# Patient Record
Sex: MALE | Race: WHITE | Employment: OTHER | URBAN - METROPOLITAN AREA
[De-identification: names, ages, dates, MRNs, and addresses within clinical notes are randomized per-mention and may not be internally consistent; named-entity substitution may affect disease eponyms.]

---

## 2017-03-27 ENCOUNTER — OFFICE VISIT (OUTPATIENT)
Dept: CARDIOLOGY CLINIC | Age: 72
End: 2017-03-27

## 2017-03-27 VITALS
BODY MASS INDEX: 32.93 KG/M2 | RESPIRATION RATE: 16 BRPM | HEART RATE: 55 BPM | DIASTOLIC BLOOD PRESSURE: 80 MMHG | OXYGEN SATURATION: 99 % | WEIGHT: 235.2 LBS | SYSTOLIC BLOOD PRESSURE: 142 MMHG | HEIGHT: 71 IN

## 2017-03-27 DIAGNOSIS — I48.0 PAROXYSMAL A-FIB (HCC): Primary | ICD-10-CM

## 2017-03-27 RX ORDER — DIGOXIN 125 MCG
TABLET ORAL
Qty: 36 TAB | Refills: 3 | Status: SHIPPED | OUTPATIENT
Start: 2017-03-27 | End: 2018-04-11 | Stop reason: SDUPTHER

## 2017-03-27 RX ORDER — ATENOLOL 25 MG/1
25 TABLET ORAL DAILY
Qty: 90 TAB | Refills: 3 | Status: SHIPPED | OUTPATIENT
Start: 2017-03-27 | End: 2018-04-11 | Stop reason: SDUPTHER

## 2017-03-27 NOTE — PROGRESS NOTES
[facility] [place]: Richard Cristina  [phone]    HPI:   Mr. Elisabeth Olivo is a 64 yo M seen initially for afib with RVR noted on 1/21/13 ER visit, h/o CT followed by Dr. Ray Preston for question of aortic dissection and pt was started on atenolol; CT chest was recommended but pt did not want \"extra radiation\". Since his last visit, he continues to do well. He has rare palpitations that he may have for a day or two, but no associated lightheadedness, dizziness or shortness of breath. He denies any exertional chest pains. His breathing has been good. He likes to take natural supplements and we had multiple discussions in the past about this. EKG is sinus bradycardia with a heart rate of 55 and nonspecific ST-T wave abnormality, unchanged. He is compensated on exam with clear lungs. Soc Hx. No tobacco.  . 1 child. [employer] . Fam Hx. Father side uncles CAD 52's  Assessment and Plan:  Paroxysmal atrial fibrillation. Continues stable on Atenolol and Digoxin. His EKG is sinus rhythm here. CHADS VASC score is on the lower side and will continue aspirin for now. As we get closer to 74years old, would recommend being on a stronger blood thinner, but he notes a lot of reluctance to that. Will address it when we get there. Will have him follow up in one year. He  has a past medical history of Essential hypertension. He also has no past medical history of Diabetes or Hyperlipidemia. All other systems negative except as above. PE  Filed Vitals:    01/30/14 0947 01/30/14 0951   BP: 140/80 136/78   Pulse: 56    Resp: 18    Height: 5' 11\" (1.803 m)    Weight: 234 lb 9.6 oz (106.414 kg)     Body mass index is 32.73 kg/(m^2).    General appearance - alert, well appearing, and in no distress  Mental status - affect appropriate to mood  Eyes - sclera anicteric, moist mucous membranes  Neck - supple, no JVD  Chest - clear to auscultation, no wheezes, rales or rhonchi  Heart - regular rhythm colton, normal S1, S2, I/VI systolic murmur LUSB  Abdomen - soft, nontender, nondistended  Extremities - peripheral pulses normal, no pedal edema  Recent Labs:  No results found for this basename: CHOL,  [id number],  CHOLX,  CHOLP,  CHLST,  CHOLV,  [id number],  HDL,  [id number],  HDLC,  HDLP,  LDL,  DLDL,  LDLC,  DLDLP,  TGL,  Khushi Shall,  [id number],  TRIGP,  CHHD,  [facility]     Lab Results   Component Value Date/Time    Creatinine 0.51 1/21/2013  3:40 AM     Lab Results   Component Value Date/Time    BUN 16 1/21/2013  3:40 AM     Lab Results   Component Value Date/Time    Potassium 3.7 1/21/2013  3:40 AM     No results found for this basename: HBA1C,  HGBE8     Lab Results   Component Value Date/Time    HGB 13.8 1/21/2013  3:40 AM     Lab Results   Component Value Date/Time    PLATELET 223 0/89/3260  3:40 AM       Reviewed:  Past Medical History   Diagnosis Date    Essential hypertension      History   Smoking status    Never Smoker    Smokeless tobacco    Never Used     History   Alcohol Use    Yes     Comment: Occasional     No Known Allergies    Current Outpatient Prescriptions   Medication Sig    OTHER Heart Herbal Formula ([place] berry, garlic, eulalia, cactus stem and flower, red clover, motherwort herb, habanero pepper) Couple of drops 2-3 days a week    atenolol (TENORMIN) 25 mg tablet Take 1 Tab by mouth daily.  digoxin (LANOXIN) 0.125 mg tablet Take 1 Tab by mouth every Monday, Wednesday, Friday.  folic acid 285 mcg tablet Take 400 mcg by mouth daily.  DOCOSAHEXANOIC ACID/EPA (FISH OIL PO) Take  by mouth. 1 tblspn daily     coenzyme q10-vitamin e (COQ10 ) 100-100 mg-unit cap Take 1 Tab by mouth daily.  aspirin (ASPIRIN) 325 mg tablet Take 1 Tab by mouth daily. No current facility-administered medications for this visit.        MD Kristin Jewell [facility] [place]  [address] [facility] [place], [address] [place] [address]

## 2017-03-27 NOTE — LETTER
3/28/2017 9:37 AM 
 
Patient:  Ximena Christopher YOB: 1945 Date of Visit: 3/27/2017 Marga Pompa MD 
[address] [address] VIA Facsimile: [phone] 
 : 
Dear Dimas Woodall, 
 
Mr. Ximena Christopher is a 64 yo M seen initially for afib with RVR noted on 1/21/13 ER visit, h/o CT followed by Dr. Daniela Mccollum for question of aortic dissection and pt was started on atenolol; CT chest was recommended but pt did not want \"extra radiation\". Since his last visit, he continues to do well. He has rare palpitations that he may have for a day or two, but no associated lightheadedness, dizziness or shortness of breath. He denies any exertional chest pains. His breathing has been good. He likes to take natural supplements and we had multiple discussions in the past about this. EKG is sinus bradycardia with a heart rate of 55 and nonspecific ST-T wave abnormality, unchanged. He is compensated on exam with clear lungs. Soc Hx. No tobacco.  . 1 child. [facility] . Fam Hx. Father side uncles CAD 52's Assessment and Plan:  Paroxysmal atrial fibrillation. Continues stable on Atenolol and Digoxin. His EKG is sinus rhythm here. CHADS VASC score is on the lower side and will continue aspirin for now. As we get closer to 74years old, would recommend being on a stronger blood thinner, but he notes a lot of reluctance to that. Will address it when we get there. Will have him follow up in one year. If you have questions, please do not hesitate to call me. Sincerely, Rene Burns MD

## 2017-03-27 NOTE — MR AVS SNAPSHOT
Visit Information Date & Time Provider Department Dept. Phone Encounter #  
 3/27/2017 11:20 AM Gaston Valdez MD [facility] Samara Buck [phone] [id number] Upcoming Health Maintenance Date Due DTaP/Tdap/Td series (1 - Tdap) 9/22/1964 FOBT Q 1 YEAR AGE 50-75 9/22/1993 ZOSTER VACCINE AGE 60> 9/22/2003 GLAUCOMA SCREENING Q2Y 9/22/2008 Pneumococcal 65+ Low/Medium Risk (1 of 2 - PCV13) 9/22/2008 MEDICARE YEARLY EXAM 9/22/2008 INFLUENZA AGE 9 TO ADULT 8/1/2016 Allergies as of 3/27/2017  Review Complete On: 3/27/2017 By: Gaston Valdez MD  
 No Known Allergies Current Immunizations  Never Reviewed No immunizations on file. Not reviewed this visit You Were Diagnosed With   
  
 Codes Comments Paroxysmal a-fib (HCC)    -  Primary ICD-10-CM: I48.0 ICD-9-CM: 427.31 Vitals BP Pulse Resp Height(growth percentile) Weight(growth percentile) SpO2  
 142/80 (BP 1 Location: Left arm, BP Patient Position: Sitting) (!) 55 16 5' 11\" (1.803 m) 235 lb 3.2 oz (106.7 kg) 99% BMI Smoking Status 32.8 kg/m2 Never Smoker BMI and BSA Data Body Mass Index Body Surface Area  
 32.8 kg/m 2 2.31 m 2 Preferred Pharmacy Pharmacy Name Phone 100 Urszula Verma, [facility] [phone] Your Updated Medication List  
  
   
This list is accurate as of: 3/27/17 12:29 PM.  Always use your most recent med list.  
  
  
  
  
 aspirin 325 mg tablet Commonly known as:  ASPIRIN Take 1 Tab by mouth daily. atenolol 25 mg tablet Commonly known as:  TENORMIN Take 1 Tab by mouth daily. COQ10  100-100 mg-unit Cap Generic drug:  coenzyme q10-vitamin e Take 1 Tab by mouth daily. digoxin 0.125 mg tablet Commonly known as:  LANOXIN  
TAKE 1 TABLET EVERY Brice Jacey FISH OIL PO Take  by mouth. 1 tblspn daily folic acid 850 mcg tablet Take 400 mcg by mouth daily. Taking 2 times per week OTHER Heart Herbal Formula ([place] berry, garlic, eulalia, cactus stem and flower, red clover, motherwort herb, habanero pepper) Couple of dropperfuls 2-3 days a week We Performed the Following AMB POC EKG ROUTINE W/ 12 LEADS, INTER & REP [53472 CPT(R)] Introducing [facility] & HEALTH SERVICES! Romayne Duster introduces [employer] patient portal. Now you can access parts of your medical record, email your doctor's office, and request medication refills online. 1. In your internet browser, go to [email or web address] 2. Click on the First Time User? Click Here link in the Sign In box. You will see the New Member Sign Up page. 3. Enter your [employer] Access Code exactly as it appears below. You will not need to use this code after youve completed the sign-up process. If you do not sign up before the expiration date, you must request a new code. · [employer] Access Code: [email or web address] Expires: 6/25/2017 12:29 PM 
 
4. Enter the last four digits of your Social Security Number (xxxx) and Date of Birth (mm/dd/yyyy) as indicated and click Submit. You will be taken to the next sign-up page. 5. Create a [employer] ID. This will be your [employer] login ID and cannot be changed, so think of one that is secure and easy to remember. 6. Create a [employer] password. You can change your password at any time. 7. Enter your Password Reset Question and Answer. This can be used at a later time if you forget your password. 8. Enter your e-mail address. You will receive e-mail notification when new information is available in [address] 9. Click Sign Up. You can now view and download portions of your medical record. 10. Click the Download Summary menu link to download a portable copy of your medical information.  
 
If you have questions, please visit the Frequently Asked Questions section of the Appointedd. Remember, "[employer]"hart is NOT to be used for urgent needs. For medical emergencies, dial 911. Now available from your iPhone and Android! Please provide this summary of care documentation to your next provider. Your primary care clinician is listed as Angel Ruiz. If you have any questions after today's visit, please call [phone].

## 2018-03-27 ENCOUNTER — OFFICE VISIT (OUTPATIENT)
Dept: CARDIOLOGY CLINIC | Age: 73
End: 2018-03-27

## 2018-03-27 VITALS
HEIGHT: 71 IN | HEART RATE: 60 BPM | BODY MASS INDEX: 32.84 KG/M2 | WEIGHT: 234.6 LBS | DIASTOLIC BLOOD PRESSURE: 80 MMHG | SYSTOLIC BLOOD PRESSURE: 130 MMHG | RESPIRATION RATE: 16 BRPM

## 2018-03-27 DIAGNOSIS — I48.0 PAROXYSMAL A-FIB (HCC): Primary | ICD-10-CM

## 2018-03-27 RX ORDER — WARFARIN SODIUM 5 MG/1
5 TABLET ORAL DAILY
COMMUNITY
End: 2018-03-27 | Stop reason: SDUPTHER

## 2018-03-27 RX ORDER — WARFARIN SODIUM 5 MG/1
5 TABLET ORAL DAILY
Qty: 30 TAB | Refills: 3 | Status: SHIPPED | OUTPATIENT
Start: 2018-03-27 | End: 2019-04-18 | Stop reason: ALTCHOICE

## 2018-03-27 NOTE — LETTER
3/28/2018 10:01 AM 
 
Patient:  Ana Templeton YOB: 1945 Date of Visit: 3/27/2018 Gregoria Campos MD 
[address] [address] VIA Facsimile: [phone] 
 : 
Dear Stephanie Ndiaye, 
 
Mr. Ana Templeton is a 73 yo M seen initially for afib with RVR noted on 1/21/13 ER visit, h/o CT followed by Dr. Eriberto Carrasquillo for question of aortic dissection and pt was started on atenolol; CT chest was recommended but pt did not want \"extra radiation\". Since his last visit, he continues to do well. Rare palpitations that may last minutes or hours, but no associated lightheadedness, dizziness or shortness of breath. No exertional chest pain. He may get occasional discomfort with certain foods. His breathing overall has been stable. In the past he has noted he likes taking natural substances and we have had multiple discussions about this in the past.  His blood pressure is 136/76 with a heart rate of 56. He is compensated on exam with clear lungs. Soc Hx. No tobacco.  . 1 child. [facility] . Fam Hx. Father side uncles CAD 52's Assessment and Plan:   
Paroxysmal atrial fibrillation. Continues stable on Atenolol and Digoxin for rate control. His CHADS VASC score is now almost 2 given his age and I did recommend starting either Coumadin or Eliquis. He has a lot of reservations about this and tried to answer his questions. If he does start Coumadin, he will need an INR check within four to five days. With regard to Eliquis, he would be on 5 mg twice a day. We talked about things to look out for with regard to being on a blood thinner. I told him not to take aspirin concurrently. Will have him follow back in one year. If you have questions, please do not hesitate to call me. Sincerely, Celia Raines MD

## 2018-03-27 NOTE — PROGRESS NOTES
[facility] [place]: Federica Webber  [phone]    HPI:   Mr. Brooks Gordon is a 73 yo M seen initially for afib with RVR noted on 1/21/13 ER visit, h/o CT followed by Dr. Magalie Valle for question of aortic dissection and pt was started on atenolol; CT chest was recommended but pt did not want \"extra radiation\". Since his last visit, he continues to do well. Rare palpitations that may last minutes or hours, but no associated lightheadedness, dizziness or shortness of breath. No exertional chest pain. He may get occasional discomfort with certain foods. His breathing overall has been stable. In the past he has noted he likes taking natural substances and we have had multiple discussions about this in the past.  His blood pressure is 136/76 with a heart rate of 56. He is compensated on exam with clear lungs. Soc Hx. No tobacco.  . 1 child. [facility] . Fam Hx. Father side uncles CAD 52's  Assessment and Plan:    Paroxysmal atrial fibrillation. Continues stable on Atenolol and Digoxin for rate control. His CHADS VASC score is now almost 2 given his age and I did recommend starting either Coumadin or Eliquis. He has a lot of reservations about this and tried to answer his questions. If he does start Coumadin, he will need an INR check within four to five days. With regard to Eliquis, he would be on 5 mg twice a day. We talked about things to look out for with regard to being on a blood thinner. I told him not to take aspirin concurrently. Will have him follow back in one year. He  has a past medical history of Essential hypertension. He also has no past medical history of Diabetes or Hyperlipidemia. All other systems negative except as above. PE  Filed Vitals:    01/30/14 0947 01/30/14 0951   BP: 140/80 136/78   Pulse: 56    Resp: 18    Height: 5' 11\" (1.803 m)    Weight: 234 lb 9.6 oz (106.414 kg)     Body mass index is 32.73 kg/(m^2).    General appearance - alert, well appearing, and in no distress  Mental status - affect appropriate to mood  Eyes - sclera anicteric, moist mucous membranes  Neck - supple, no JVD  Chest - clear to auscultation, no wheezes, rales or rhonchi  Heart - regular rhythm colton, normal S1, S2, I/VI systolic murmur LUSB  Abdomen - soft, nontender, nondistended  Extremities - peripheral pulses normal, no pedal edema  Recent Labs:  No results found for this basename: CHOL,  [id number],  CHOLX,  CHOLP,  CHLST,  CHOLV,  [id number],  HDL,  [id number],  HDLC,  HDLP,  LDL,  DLDL,  LDLC,  DLDLP,  TGL,  [place],  [id number],  TRIGP,  CHHD,  CHHDX     Lab Results   Component Value Date/Time    Creatinine 0.51 1/21/2013  3:40 AM     Lab Results   Component Value Date/Time    BUN 16 1/21/2013  3:40 AM     Lab Results   Component Value Date/Time    Potassium 3.7 1/21/2013  3:40 AM     No results found for this basename: HBA1C,  HGBE8     Lab Results   Component Value Date/Time    HGB 13.8 1/21/2013  3:40 AM     Lab Results   Component Value Date/Time    PLATELET 261 4/14/3638  3:40 AM       Reviewed:  Past Medical History   Diagnosis Date    Essential hypertension      History   Smoking status    Never Smoker    Smokeless tobacco    Never Used     History   Alcohol Use    Yes     Comment: Occasional     No Known Allergies    Current Outpatient Prescriptions   Medication Sig    OTHER Heart Herbal Formula ([place] berry, garlic, eulalia, cactus stem and flower, red clover, motherwort herb, habanero pepper) Couple of drops 2-3 days a week    atenolol (TENORMIN) 25 mg tablet Take 1 Tab by mouth daily.  digoxin (LANOXIN) 0.125 mg tablet Take 1 Tab by mouth every Monday, Wednesday, Friday.  folic acid 425 mcg tablet Take 400 mcg by mouth daily.  DOCOSAHEXANOIC ACID/EPA (FISH OIL PO) Take  by mouth. 1 tblspn daily     coenzyme q10-vitamin e (COQ10 ) 100-100 mg-unit cap Take 1 Tab by mouth daily.       aspirin (ASPIRIN) 325 mg tablet Take 1 Tab by mouth daily. No current facility-administered medications for this visit.        Carmelina Garsia MD  [facility] [place]  [address] [place], [address] [place] [address]

## 2018-03-27 NOTE — MR AVS SNAPSHOT
[address] [facility] [address] [place] 
[phone] Patient: Ced Dickerson MRN: [id number] LLU:[birth year]/90/[birth year]99 Visit Information Date & Time Provider Department Dept. Phone Encounter #  
 3/27/2018  1:00 PM Michelle Andrade MD [facility] [place] [phone] [id number] Your Appointments 4/2/2019  1:00 PM  
ESTABLISHED PATIENT with Michelle Andrade MD  
[facility] ([address]) Appt Note: 1 yr f/u per Dr. Aguirre Deaner [address] [place] [address]  
[address] [facility] [address] Upcoming Health Maintenance Date Due DTaP/Tdap/Td series (1 - Tdap) 9/22/1964 FOBT Q 1 YEAR AGE 50-75 9/22/1993 ZOSTER VACCINE AGE 60> 7/22/2003 GLAUCOMA SCREENING Q2Y 9/22/2008 Pneumococcal 65+ Low/Medium Risk (1 of 2 - PCV13) 9/22/2008 Influenza Age 5 to Adult 8/1/2017 MEDICARE YEARLY EXAM 3/14/2018 Allergies as of 3/27/2018  Review Complete On: 3/27/2018 By: Michelle Andrade MD  
 No Known Allergies Current Immunizations  Never Reviewed No immunizations on file. Not reviewed this visit You Were Diagnosed With   
  
 Codes Comments Paroxysmal a-fib (HCC)    -  Primary ICD-10-CM: I48.0 ICD-9-CM: 427.31 Vitals BP Pulse Resp Height(growth percentile) Weight(growth percentile) BMI  
 130/80 (BP 1 Location: Right arm, BP Patient Position: Sitting) 60 16 5' 11\" (1.803 m) 234 lb 9.6 oz (106.4 kg) 32.72 kg/m2 Smoking Status Never Smoker Vitals History BMI and BSA Data Body Mass Index Body Surface Area 32.72 kg/m 2 2.31 m 2 Preferred Pharmacy Pharmacy Name Phone 100 [facility] [phone] Your Updated Medication List  
  
   
 This list is accurate as of 3/27/18  1:38 PM.  Always use your most recent med list.  
  
  
  
  
 apixaban 5 mg tablet Commonly known as:  Floyd Means Take 1 Tab by mouth two (2) times a day. aspirin 325 mg tablet Commonly known as:  ASPIRIN Take 1 Tab by mouth daily. atenolol 25 mg tablet Commonly known as:  TENORMIN Take 1 Tab by mouth daily. COQ10  100-100 mg-unit Cap Generic drug:  coenzyme q10-vitamin e Take 1 Tab by mouth daily. digoxin 0.125 mg tablet Commonly known as:  LANOXIN  
TAKE 1 TABLET EVERY Param Golder FISH OIL PO Take  by mouth. 1 tblspn daily  
  
 folic acid 744 mcg tablet Take 400 mcg by mouth daily. Taking 2 times per week OTHER Heart Herbal Formula ([place] berry, garlic, eulalia, cactus stem and flower, red clover, motherwort herb, habanero pepper) Couple of dropperfuls 2-3 days a week  
  
 warfarin 5 mg tablet Commonly known as:  COUMADIN Take 1 Tab by mouth daily. Prescriptions Printed Refills  
 apixaban (ELIQUIS) 5 mg tablet 3 Sig: Take 1 Tab by mouth two (2) times a day. Class: Print Route: Oral  
 warfarin (COUMADIN) 5 mg tablet 3 Sig: Take 1 Tab by mouth daily. Class: Print Route: Oral  
  
Introducing [facility] & HEALTH SERVICES! Yvan Fabian introduces [employer] patient portal. Now you can access parts of your medical record, email your doctor's office, and request medication refills online. 1. In your internet browser, go to [email or web address] 2. Click on the First Time User? Click Here link in the Sign In box. You will see the New Member Sign Up page. 3. Enter your [employer] Access Code exactly as it appears below. You will not need to use this code after youve completed the sign-up process. If you do not sign up before the expiration date, you must request a new code. · [employer] Access Code: [email or web address] Expires: 6/25/2018  1:38 PM 
 
 4. Enter the last four digits of your Social Security Number (xxxx) and Date of Birth (mm/dd/yyyy) as indicated and click Submit. You will be taken to the next sign-up page. 5. Create a [employer]ina ID. This will be your [employer] login ID and cannot be changed, so think of one that is secure and easy to remember. 6. Create a [employer] password. You can change your password at any time. 7. Enter your Password Reset Question and Answer. This can be used at a later time if you forget your password. 8. Enter your e-mail address. You will receive e-mail notification when new information is available in [address] 9. Click Sign Up. You can now view and download portions of your medical record. 10. Click the Download Summary menu link to download a portable copy of your medical information. If you have questions, please visit the Frequently Asked Questions section of the [employer] website. Remember, [employer] is NOT to be used for urgent needs. For medical emergencies, dial 911. Now available from your iPhone and Android! Please provide this summary of care documentation to your next provider. Your primary care clinician is listed as Gus Almodovar. If you have any questions after today's visit, please call [phone].

## 2018-04-11 RX ORDER — ATENOLOL 25 MG/1
25 TABLET ORAL DAILY
Qty: 90 TAB | Refills: 3 | Status: SHIPPED | OUTPATIENT
Start: 2018-04-11 | End: 2019-04-19 | Stop reason: SDUPTHER

## 2018-04-11 RX ORDER — DIGOXIN 125 MCG
TABLET ORAL
Qty: 36 TAB | Refills: 3 | Status: SHIPPED | OUTPATIENT
Start: 2018-04-11 | End: 2019-04-19 | Stop reason: SDUPTHER

## 2018-04-11 NOTE — TELEPHONE ENCOUNTER
Pt is asking for a refill on Dijoxin and atenolol. I do not see the disjoxin on the pt's med list. Pharmacy verified. Requested Prescriptions     Pending Prescriptions Disp Refills    atenolol (TENORMIN) 25 mg tablet 90 Tab 3     Sig: Take 1 Tab by mouth daily. Thanks!

## 2018-04-11 NOTE — TELEPHONE ENCOUNTER
Requested Prescriptions     Signed Prescriptions Disp Refills    atenolol (TENORMIN) 25 mg tablet 90 Tab 3     Sig: Take 1 Tab by mouth daily. Authorizing Provider: Karen Leo     Ordering User: Kodak Whitley digoxin (LANOXIN) 0.125 mg tablet 36 Tab 3     Sig: TAKE 1 TABLET EVERY MONDAY, [address] [facility], [address] [facility] Provider: Karen Leo     Ordering User: Elly Salomon     Per orders.

## 2019-04-18 ENCOUNTER — OFFICE VISIT (OUTPATIENT)
Dept: CARDIOLOGY CLINIC | Age: 74
End: 2019-04-18

## 2019-04-18 VITALS
DIASTOLIC BLOOD PRESSURE: 68 MMHG | HEIGHT: 71 IN | OXYGEN SATURATION: 98 % | HEART RATE: 56 BPM | SYSTOLIC BLOOD PRESSURE: 120 MMHG | RESPIRATION RATE: 16 BRPM | WEIGHT: 248 LBS | BODY MASS INDEX: 34.72 KG/M2

## 2019-04-18 DIAGNOSIS — I48.0 PAROXYSMAL A-FIB (HCC): Primary | ICD-10-CM

## 2019-04-18 NOTE — LETTER
4/18/2019 3:32 PM 
 
Patient:  Aquilino Billy YOB: 1945 Date of Visit: 4/18/2019 Katie Faulkner MD 
[address] [address] VIA Facsimile: [phone] 
 : 
Dear Denise Davis, 
 
Mr. Aquilino Billy is a 73 yo M seen initially for afib with RVR noted on 1/21/13 ER visit, h/o CT followed by Dr. Amisha Damico for question of aortic dissection and pt was started on atenolol; CT chest was recommended but pt did not want \"extra radiation\". Since his last visit, he has been doing well. No significant palpitations. His breathing has been normal.  He denies any chest pain. We did talk in the past about Coumadin versus Eliquis given his CHADS VASC score and he has been reading a lot about this. In particular with Coumadin, he notes he is unable to work with the dietary restrictions. Eliquis he is interested in taking, but notes that it is very expensive and it seems like it is $400.00 at first, but then $50.00 a month after that. We did talk about this at length and discussed the benefits and risks. He is compensated on exam with clear lungs and no lower extremity edema. His EKG is sinus bradycardia, heart rate of 54 and nonspecific ST-T wave abnormality. Soc Hx. No tobacco.  . 1 child. [facility] . Fam Hx. Father side uncles CAD 52's Assessment and Plan:  Paroxysmal atrial fibrillation. Stable on Atenolol and Digoxin for rate control. His CHADS VASC score is at least 2. He is agreeable now to starting Eliquis and this will replace aspirin. Will have him follow back in one year. If you have questions, please do not hesitate to call me. Sincerely, Sindi Tinoco MD

## 2019-04-18 NOTE — PROGRESS NOTES
[facility] [place]: Kwabena Melchor  [phone]    HPI:   Mr. Trish Dumont is a 75 yo M seen initially for afib with RVR noted on 1/21/13 ER visit, h/o CT followed by Dr. Meaghan Singh for question of aortic dissection and pt was started on atenolol; CT chest was recommended but pt did not want \"extra radiation\". Since his last visit, he has been doing well. No significant palpitations. His breathing has been normal.  He denies any chest pain. We did talk in the past about Coumadin versus Eliquis given his CHADS VASC score and he has been reading a lot about this. In particular with Coumadin, he notes he is unable to work with the dietary restrictions. Eliquis he is interested in taking, but notes that it is very expensive and it seems like it is $400.00 at first, but then $50.00 a month after that. We did talk about this at length and discussed the benefits and risks. He is compensated on exam with clear lungs and no lower extremity edema. His EKG is sinus bradycardia, heart rate of 54 and nonspecific ST-T wave abnormality. Soc Hx. No tobacco.  . 1 child. [facility] . Fam Hx. Father side uncles CAD 52's  Assessment and Plan:  Paroxysmal atrial fibrillation. Stable on Atenolol and Digoxin for rate control. His CHADS VASC score is at least 2. He is agreeable now to starting Eliquis and this will replace aspirin. Will have him follow back in one year. He  has a past medical history of Essential hypertension. He also has no past medical history of Diabetes or Hyperlipidemia. All other systems negative except as above. PE  Filed Vitals:    01/30/14 0947 01/30/14 0951   BP: 140/80 136/78   Pulse: 56    Resp: 18    Height: 5' 11\" (1.803 m)    Weight: 234 lb 9.6 oz (106.414 kg)     Body mass index is 32.73 kg/(m^2).    General appearance - alert, well appearing, and in no distress  Mental status - affect appropriate to mood  Eyes - sclera anicteric, moist mucous membranes  Neck - supple, no JVD  Chest - clear to auscultation, no wheezes, rales or rhonchi  Heart - regular rhythm colton, normal S1, S2, I/VI systolic murmur LUSB  Abdomen - soft, nontender, nondistended  Extremities - peripheral pulses normal, no pedal edema  Recent Labs:  No results found for this basename: CHOL,  [id number],  CHOLX,  CHOLP,  CHLST,  CHOLV,  [id number],  HDL,  [id number],  HDLC,  HDLP,  LDL,  DLDL,  LDLC,  DLDLP,  TGL,  Singh ,  [id number],  TRIGP,  CHHD,  [facility]     Lab Results   Component Value Date/Time    Creatinine 0.51 1/21/2013  3:40 AM     Lab Results   Component Value Date/Time    BUN 16 1/21/2013  3:40 AM     Lab Results   Component Value Date/Time    Potassium 3.7 1/21/2013  3:40 AM     No results found for this basename: HBA1C,  HGBE8     Lab Results   Component Value Date/Time    HGB 13.8 1/21/2013  3:40 AM     Lab Results   Component Value Date/Time    PLATELET 691 2/26/5411  3:40 AM       Reviewed:  Past Medical History   Diagnosis Date    Essential hypertension      History   Smoking status    Never Smoker    Smokeless tobacco    Never Used     History   Alcohol Use    Yes     Comment: Occasional     No Known Allergies    Current Outpatient Prescriptions   Medication Sig    OTHER Heart Herbal Formula ([place] berry, garlic, eulalia, cactus stem and flower, red clover, motherwort herb, habanero pepper) Couple of drops 2-3 days a week    atenolol (TENORMIN) 25 mg tablet Take 1 Tab by mouth daily.  digoxin (LANOXIN) 0.125 mg tablet Take 1 Tab by mouth every Monday, Wednesday, Friday.  folic acid 199 mcg tablet Take 400 mcg by mouth daily.  DOCOSAHEXANOIC ACID/EPA (FISH OIL PO) Take  by mouth. 1 tblspn daily     coenzyme q10-vitamin e (COQ10 ) 100-100 mg-unit cap Take 1 Tab by mouth daily.  aspirin (ASPIRIN) 325 mg tablet Take 1 Tab by mouth daily. No current facility-administered medications for this visit.        Jennifer Alvarado MD  [facility] [address] [facility] [address], [address] [facility]  [address] [facility], [address] [place] [address]

## 2019-04-19 RX ORDER — ATENOLOL 25 MG/1
25 TABLET ORAL DAILY
Qty: 90 TAB | Refills: 3 | Status: SHIPPED | OUTPATIENT
Start: 2019-04-19 | End: 2020-03-31

## 2019-04-19 RX ORDER — DIGOXIN 125 MCG
TABLET ORAL
Qty: 36 TAB | Refills: 3 | Status: SHIPPED | OUTPATIENT
Start: 2019-04-19 | End: 2020-03-31

## 2019-06-05 ENCOUNTER — TELEPHONE (OUTPATIENT)
Dept: CARDIOLOGY CLINIC | Age: 74
End: 2019-06-05

## 2019-06-05 NOTE — TELEPHONE ENCOUNTER
Pt called asking if he has to get a tooth pulled can he stop Eliquis a few days prior to his surgery. He will also need a cardiac clearance in writing.   Phone #[phone]  Thanks

## 2019-06-05 NOTE — TELEPHONE ENCOUNTER
MD Chevy Vilchis, RN   Caller: Unspecified (Today,  8:02 AM)             Hold eliquis 48 hrs prior. Stable cardiac wise.  thx

## 2019-06-05 NOTE — TELEPHONE ENCOUNTER
Returned call to patient. Two patient indentifiers verified. Pt was informed to hold Eliquis x 48 hours. Pt verbalized understanding. Pt was not sure if need clearance. Pt was informed that a clearance letter has been written and signed and will hold on to incase it is needed. He will call back with fax number if needed.

## 2019-06-05 NOTE — LETTER
6/5/2019 10:17 AM 
 
Mr. Lliiane Crabtree [address] 
[address] This patient is at low risk from a cardiac standpoint for upcoming procedure. Patient is okay to hold Eliquis for 2 days prior to procedure, please resume the day following procedure. If you have any questions please contact our office at [phone]. Sincerely, Chi Sood MD

## 2020-03-31 RX ORDER — APIXABAN 5 MG/1
TABLET, FILM COATED ORAL
Qty: 180 TAB | Refills: 0 | Status: SHIPPED | OUTPATIENT
Start: 2020-03-31 | End: 2020-04-14 | Stop reason: SDUPTHER

## 2020-03-31 RX ORDER — DIGOXIN 125 MCG
TABLET ORAL
Qty: 36 TAB | Refills: 0 | Status: SHIPPED | OUTPATIENT
Start: 2020-03-31 | End: 2020-04-14 | Stop reason: SDUPTHER

## 2020-03-31 RX ORDER — ATENOLOL 25 MG/1
TABLET ORAL
Qty: 90 TAB | Refills: 0 | Status: SHIPPED | OUTPATIENT
Start: 2020-03-31 | End: 2020-04-14 | Stop reason: SDUPTHER

## 2020-03-31 NOTE — TELEPHONE ENCOUNTER
Requested Prescriptions     Signed Prescriptions Disp Refills    digoxin (LANOXIN) 0.125 mg tablet 36 Tab 0     Sig: TAKE 1 TABLET EVERY MONDAY, WEDNESDAY AND FRIDAY     Authorizing Provider: Jessica Black     Ordering User: DANIELA JONES    Eliquis 5 mg tablet 180 Tab 0     Sig: TAKE 1 TABLET TWICE A DAY     Authorizing Provider: Jessica Black     Ordering User: Tricia Vargas    atenoloL (TENORMIN) 25 mg tablet 90 Tab 0     Sig: TAKE 1 TABLET DAILY     Authorizing Provider: Jessica Black     Ordering User: Tricia Vargas       Last office visit 4/18/19    Per Dr. Annel Brice   Date Time Provider [facility]   4/20/2020  2:40 PM Dean Segovia  [address]

## 2020-04-10 NOTE — PROGRESS NOTES
TELEPHONE VISIT DOCUMENTATION     Pursuant to the emergency declaration under the [facility], [facility] waiver authority and the [employer] Act, this Telephone Visit was conducted, with patient's consent, to reduce the patient's risk of exposure to COVID-19 and provide continuity of care for an established patient. He and/or health care decision maker is aware that that he may receive a bill for this telephone service, depending on his insurance coverage, and has provided verbal consent to proceed. This is a Patient Initiated Episode with an Established Patient who has not had a related appointment within my department in the past 7 days or scheduled within the next 24 hours. HISTORY OF PRESENTING ILLNESS      Bret Hammonds is a 66 y.o. male evaluated via telephone on 4/14/2020 due to COVID 19 restrictions. Patient unable to participate in Virtual Visit with synchronous audio/visual technology. Mr. Bret Hammonds is a 65 yo M seen initially for afib with RVR noted on 1/21/13 ER visit, h/o CT followed by Dr. Ministerio Guerrier for question of aortic dissection and pt was started on atenolol; CT chest was recommended but pt did not want \"extra radiation\". Since his last visit, he overall continues to do well. He denies any chest pain. His breathing has been stable. He has some baseline shortness of breath. He has rare palpitations. No major bleeding issues on Eliquis though, he did have some bleeding after having a tooth pulled. He has been compliant with his medications. Soc Hx. No tobacco.  . 1 child. [facility] . Fam Hx. Father side uncles CAD 52's  Assessment and Plan:    1. Paroxysmal atrial fibrillation. Stable on Atenolol and Digoxin for rate control. No bleeding issues on Eliquis. 2. Essential hypertension. Blood pressure is controlled. Will have him follow back in one year. ASSESSMENT/PLAN:      We discussed the expected course, resolution and complications of the diagnosis(es) in detail. Medication risks, benefits, costs, interactions, and alternatives were discussed as indicated. I advised him to contact the office if his condition worsens, changes or fails to improve as anticipated. He expressed understanding with the diagnosis(es) and plan       ACTIVE PROBLEM LIST     Patient Active Problem List    Diagnosis Date Noted    Paroxysmal A-fib ([facility] 02/25/2016    Afib ([facility] 01/24/2013           PAST MEDICAL HISTORY     Past Medical History:   Diagnosis Date    Essential hypertension            PAST SURGICAL HISTORY     No past surgical history on file. ALLERGIES     No Known Allergies       FAMILY HISTORY     Family History   Problem Relation Age of Onset    Coronary Artery Disease Paternal Uncle     negative for cardiac disease       SOCIAL HISTORY     Social History     Socioeconomic History    Marital status:      Spouse name: Not on file    Number of children: Not on file    Years of education: Not on file    Highest education level: Not on file   Tobacco Use    Smoking status: Never Smoker    Smokeless tobacco: Never Used   Substance and Sexual Activity    Alcohol use: Yes     Comment: Xstwlfoqfv-7-5 drinks per week    Drug use: No         MEDICATIONS     Current Outpatient Medications   Medication Sig    digoxin (LANOXIN) 0.125 mg tablet TAKE 1 TABLET EVERY MONDAY, WEDNESDAY AND FRIDAY    Eliquis 5 mg tablet TAKE 1 TABLET TWICE A DAY    atenoloL (TENORMIN) 25 mg tablet TAKE 1 TABLET DAILY    OTHER Heart Herbal Formula ([place] berry, garlic, eulalia, cactus stem and flower, red clover, motherwort herb, habanero pepper) Couple of dropperfuls 2-3 days a week    folic acid 987 mcg tablet Take 400 mcg by mouth daily. Taking 2 times per week    coenzyme q10-vitamin e (COQ10 ) 100-100 mg-unit cap Take 1 Tab by mouth daily.       DOCOSAHEXANOIC ACID/EPA (FISH OIL PO) Take  by mouth. 1 tblspn daily      No current facility-administered medications for this visit. I have reviewed the nurses notes, vitals, problem list, allergy list, medical history, family, social history and medications. REVIEW OF SYMPTOMS     Constitutional: Negative for fever, chills, malaise/fatigue and diaphoresis. Respiratory: Negative for cough, hemoptysis, sputum production, shortness of breath and wheezing. Cardiovascular: Negative for chest pain, palpitations, orthopnea, claudication, leg swelling and PND. Gastrointestinal: Negative for heartburn, nausea, vomiting, blood in stool and melena. Genitourinary: Negative for dysuria and flank pain. Musculoskeletal: Negative for joint pain and back pain. Skin: Negative for rash. Neurological: Negative for focal weakness, seizures, loss of consciousness, weakness and headaches. Endo/Heme/Allergies: Negative for abnormal bleeding. Psychiatric/Behavioral: Negative for memory loss. DIAGNOSTIC DATA      No specialty comments available. LABORATORY DATA      Lab Results   Component Value Date/Time    WBC 7.8 01/21/2013 03:40 AM    HGB 13.8 01/21/2013 03:40 AM    HCT 39.7 01/21/2013 03:40 AM    PLATELET 853 99/60/6822 03:40 AM    MCV 88.6 01/21/2013 03:40 AM      Lab Results   Component Value Date/Time    Sodium 140 01/21/2013 03:40 AM    Potassium 3.7 01/21/2013 03:40 AM    Chloride 105 01/21/2013 03:40 AM    CO2 21 01/21/2013 03:40 AM    Anion gap 14 01/21/2013 03:40 AM    Glucose 102 (H) 01/21/2013 03:40 AM    BUN 16 01/21/2013 03:40 AM    Creatinine 0.51 01/21/2013 03:40 AM    BUN/Creatinine ratio 31 (H) 01/21/2013 03:40 AM    GFR est AA >60 01/21/2013 03:40 AM    GFR est non-AA >60 01/21/2013 03:40 AM    Calcium 9.0 01/21/2013 03:40 AM    Bilirubin, total 0.4 01/21/2013 03:40 AM    AST (SGOT) 15 01/21/2013 03:40 AM    Alk.  phosphatase 57 01/21/2013 03:40 AM    Protein, total 7.2 01/21/2013 03:40 AM    Albumin 3.6 01/21/2013 03:40 AM    Globulin 3.6 01/21/2013 03:40 AM    A-G Ratio 1.0 (L) 01/21/2013 03:40 AM    ALT (SGPT) 22 01/21/2013 03:40 AM            Total Time: 21 minutes      Lanette Christensen MD    [facility] [address], [address] [place] [address],[address]  [facility], [facility]  [phone] / [phone] Fax

## 2020-04-14 ENCOUNTER — VIRTUAL VISIT (OUTPATIENT)
Dept: CARDIOLOGY CLINIC | Age: 75
End: 2020-04-14

## 2020-04-14 VITALS — SYSTOLIC BLOOD PRESSURE: 141 MMHG | WEIGHT: 244.4 LBS | BODY MASS INDEX: 34.09 KG/M2 | DIASTOLIC BLOOD PRESSURE: 71 MMHG

## 2020-04-14 DIAGNOSIS — I48.0 PAROXYSMAL A-FIB (HCC): Primary | ICD-10-CM

## 2020-04-14 RX ORDER — DIGOXIN 125 MCG
TABLET ORAL
Qty: 36 TAB | Refills: 3 | Status: SHIPPED | OUTPATIENT
Start: 2020-04-14 | End: 2021-04-14 | Stop reason: SDUPTHER

## 2020-04-14 RX ORDER — ATENOLOL 25 MG/1
TABLET ORAL
Qty: 90 TAB | Refills: 3 | Status: SHIPPED | OUTPATIENT
Start: 2020-04-14 | End: 2021-04-14 | Stop reason: SDUPTHER

## 2020-04-14 NOTE — LETTER
Patient:  Oskar Myers YOB: 1945 Date of Visit: 4/14/2020 Arlene Contreras MD 
[id number] [facility] [address] [place] [address] VIA Facsimile: [phone]: 
 
Dear [place], 
 
Mr. Oskar Myers is a 67 yo M seen initially for afib with RVR noted on 1/21/13 ER visit, h/o CT followed by Dr. Akbar Barajas for question of aortic dissection and pt was started on atenolol; CT chest was recommended but pt did not want \"extra radiation\". Since his last visit, he overall continues to do well. He denies any chest pain. His breathing has been stable. He has some baseline shortness of breath. He has rare palpitations. No major bleeding issues on Eliquis though, he did have some bleeding after having a tooth pulled. He has been compliant with his medications. Soc Hx. No tobacco.  . 1 child. [facility] . Fam Hx. Father side uncles CAD 52's Assessment and Plan: 1. Paroxysmal atrial fibrillation. Stable on Atenolol and Digoxin for rate control. No bleeding issues on Eliquis. 2. Essential hypertension. Blood pressure is controlled. Will have him follow back in one year. If you have questions, please do not hesitate to call me. Sincerely, Juliann Wilkins MD

## 2020-04-14 NOTE — TELEPHONE ENCOUNTER
Requested Prescriptions     Signed Prescriptions Disp Refills    digoxin (LANOXIN) 0.125 mg tablet 36 Tab 3     Sig: TAKE 1 TABLET EVERY MONDAY, WEDNESDAY AND FRIDAY     Authorizing Provider: Spencer Sorenson     Ordering User: Sri Ran    apixaban (Eliquis) 5 mg tablet 180 Tab 3     Sig: TAKE 1 TABLET TWICE A DAY     Authorizing Provider: Spencer Sorenson     Ordering User: Sri Ran    atenoloL (TENORMIN) 25 mg tablet 90 Tab 3     Sig: TAKE 1 TABLET DAILY     Authorizing Provider: Spencer Sorenson     Ordering User: Sri Tanner       Last office visit 4/14/20    Per Dr. Ulises Bowser   Date Time Provider [facility]   4/14/2021  1:00 PM Mir Muhammad  [address]

## 2021-01-22 ENCOUNTER — HOSPITAL ENCOUNTER (OUTPATIENT)
Dept: PREADMISSION TESTING | Age: 76
Discharge: HOME | End: 2021-01-22
Payer: MEDICARE

## 2021-01-22 LAB — SARS-COV-2, COV2: NORMAL

## 2021-01-22 PROCEDURE — U0003 INFECTIOUS AGENT DETECTION BY NUCLEIC ACID (DNA OR RNA); SEVERE ACUTE RESPIRATORY SYNDROME CORONAVIRUS 2 (SARS-COV-2) (CORONAVIRUS DISEASE [COVID-19]), AMPLIFIED PROBE TECHNIQUE, MAKING USE OF HIGH THROUGHPUT TECHNOLOGIES AS DESCRIBED BY CMS-2020-01-R: HCPCS

## 2021-01-26 ENCOUNTER — HOSPITAL ENCOUNTER (OUTPATIENT)
Age: 76
Setting detail: OUTPATIENT SURGERY
Discharge: HOME | End: 2021-01-26
Attending: INTERNAL MEDICINE | Admitting: INTERNAL MEDICINE
Payer: MEDICARE

## 2021-01-26 ENCOUNTER — ANESTHESIA (OUTPATIENT)
Dept: ENDOSCOPY | Age: 76
End: 2021-01-26
Payer: MEDICARE

## 2021-01-26 ENCOUNTER — ANESTHESIA EVENT (OUTPATIENT)
Dept: ENDOSCOPY | Age: 76
End: 2021-01-26
Payer: MEDICARE

## 2021-01-26 VITALS
RESPIRATION RATE: 17 BRPM | OXYGEN SATURATION: 96 % | HEART RATE: 129 BPM | SYSTOLIC BLOOD PRESSURE: 122 MMHG | BODY MASS INDEX: 32.93 KG/M2 | TEMPERATURE: 97.7 F | HEIGHT: 71 IN | WEIGHT: 235.2 LBS | DIASTOLIC BLOOD PRESSURE: 71 MMHG

## 2021-01-26 PROCEDURE — 88305 TISSUE EXAM BY PATHOLOGIST: CPT

## 2021-01-26 PROCEDURE — 76040000007: Performed by: INTERNAL MEDICINE

## 2021-01-26 PROCEDURE — 77030010936 HC CLP LIG BSC -C: Performed by: INTERNAL MEDICINE

## 2021-01-26 PROCEDURE — 77030013992 HC SNR POLYP ENDOSC BSC -B: Performed by: INTERNAL MEDICINE

## 2021-01-26 PROCEDURE — 77030038665 HC SOL ELEVIEW INJ AGNT ARPH -B: Performed by: INTERNAL MEDICINE

## 2021-01-26 PROCEDURE — 76060000032 HC ANESTHESIA 0.5 TO 1 HR: Performed by: INTERNAL MEDICINE

## 2021-01-26 PROCEDURE — 74011250637 HC RX REV CODE- 250/637: Performed by: INTERNAL MEDICINE

## 2021-01-26 PROCEDURE — 77030040362: Performed by: INTERNAL MEDICINE

## 2021-01-26 PROCEDURE — 74011250636 HC RX REV CODE- 250/636: Performed by: INTERNAL MEDICINE

## 2021-01-26 PROCEDURE — 77030003657 HC NDL SCLER BSC -B: Performed by: INTERNAL MEDICINE

## 2021-01-26 PROCEDURE — 74011250636 HC RX REV CODE- 250/636

## 2021-01-26 PROCEDURE — 74011000250 HC RX REV CODE- 250

## 2021-01-26 PROCEDURE — 2709999900 HC NON-CHARGEABLE SUPPLY: Performed by: INTERNAL MEDICINE

## 2021-01-26 DEVICE — CLIP INT L235CM WRK CHAN DIA2.8MM OPN 11MM LCK MECHANISM MR: Type: IMPLANTABLE DEVICE | Site: SIGMOID COLON | Status: FUNCTIONAL

## 2021-01-26 RX ORDER — LIDOCAINE HYDROCHLORIDE 20 MG/ML
INJECTION, SOLUTION EPIDURAL; INFILTRATION; INTRACAUDAL; PERINEURAL AS NEEDED
Status: DISCONTINUED | OUTPATIENT
Start: 2021-01-26 | End: 2021-01-26 | Stop reason: HOSPADM

## 2021-01-26 RX ORDER — DEXTROMETHORPHAN/PSEUDOEPHED 2.5-7.5/.8
1.2 DROPS ORAL
Status: DISCONTINUED | OUTPATIENT
Start: 2021-01-26 | End: 2021-01-27 | Stop reason: HOSPADM

## 2021-01-26 RX ORDER — EPINEPHRINE 0.1 MG/ML
1 INJECTION INTRACARDIAC; INTRAVENOUS
Status: DISCONTINUED | OUTPATIENT
Start: 2021-01-26 | End: 2021-01-27 | Stop reason: HOSPADM

## 2021-01-26 RX ORDER — ATROPINE SULFATE 0.1 MG/ML
0.5 INJECTION INTRAVENOUS
Status: DISCONTINUED | OUTPATIENT
Start: 2021-01-26 | End: 2021-01-27 | Stop reason: HOSPADM

## 2021-01-26 RX ORDER — FLUMAZENIL 0.1 MG/ML
0.2 INJECTION INTRAVENOUS
Status: DISCONTINUED | OUTPATIENT
Start: 2021-01-26 | End: 2021-01-26 | Stop reason: HOSPADM

## 2021-01-26 RX ORDER — PHENYLEPHRINE HCL IN 0.9% NACL 0.4MG/10ML
SYRINGE (ML) INTRAVENOUS AS NEEDED
Status: DISCONTINUED | OUTPATIENT
Start: 2021-01-26 | End: 2021-01-26 | Stop reason: HOSPADM

## 2021-01-26 RX ORDER — SODIUM CHLORIDE 0.9 % (FLUSH) 0.9 %
5-40 SYRINGE (ML) INJECTION EVERY 8 HOURS
Status: DISCONTINUED | OUTPATIENT
Start: 2021-01-26 | End: 2021-01-27 | Stop reason: HOSPADM

## 2021-01-26 RX ORDER — SODIUM CHLORIDE 9 MG/ML
INJECTION, SOLUTION INTRAVENOUS
Status: DISCONTINUED | OUTPATIENT
Start: 2021-01-26 | End: 2021-01-26 | Stop reason: HOSPADM

## 2021-01-26 RX ORDER — NALOXONE HYDROCHLORIDE 0.4 MG/ML
0.4 INJECTION, SOLUTION INTRAMUSCULAR; INTRAVENOUS; SUBCUTANEOUS
Status: DISCONTINUED | OUTPATIENT
Start: 2021-01-26 | End: 2021-01-26 | Stop reason: HOSPADM

## 2021-01-26 RX ORDER — PROPOFOL 10 MG/ML
INJECTION, EMULSION INTRAVENOUS AS NEEDED
Status: DISCONTINUED | OUTPATIENT
Start: 2021-01-26 | End: 2021-01-26 | Stop reason: HOSPADM

## 2021-01-26 RX ORDER — SODIUM CHLORIDE 0.9 % (FLUSH) 0.9 %
5-40 SYRINGE (ML) INJECTION AS NEEDED
Status: DISCONTINUED | OUTPATIENT
Start: 2021-01-26 | End: 2021-01-27 | Stop reason: HOSPADM

## 2021-01-26 RX ORDER — SODIUM CHLORIDE 9 MG/ML
25 INJECTION, SOLUTION INTRAVENOUS CONTINUOUS
Status: DISCONTINUED | OUTPATIENT
Start: 2021-01-26 | End: 2021-01-26 | Stop reason: HOSPADM

## 2021-01-26 RX ADMIN — PROPOFOL 30 MG: 10 INJECTION, EMULSION INTRAVENOUS at 08:23

## 2021-01-26 RX ADMIN — PROPOFOL 50 MG: 10 INJECTION, EMULSION INTRAVENOUS at 08:17

## 2021-01-26 RX ADMIN — PROPOFOL 50 MG: 10 INJECTION, EMULSION INTRAVENOUS at 07:48

## 2021-01-26 RX ADMIN — PROPOFOL 50 MG: 10 INJECTION, EMULSION INTRAVENOUS at 07:59

## 2021-01-26 RX ADMIN — Medication 40 MCG: at 08:21

## 2021-01-26 RX ADMIN — Medication 80 MG: at 07:57

## 2021-01-26 RX ADMIN — SODIUM CHLORIDE 25 ML/HR: 9 INJECTION, SOLUTION INTRAVENOUS at 07:44

## 2021-01-26 RX ADMIN — Medication 40 MCG: at 08:23

## 2021-01-26 RX ADMIN — PROPOFOL 50 MG: 10 INJECTION, EMULSION INTRAVENOUS at 07:52

## 2021-01-26 RX ADMIN — SODIUM CHLORIDE: 900 INJECTION, SOLUTION INTRAVENOUS at 07:32

## 2021-01-26 RX ADMIN — PROPOFOL 30 MG: 10 INJECTION, EMULSION INTRAVENOUS at 08:10

## 2021-01-26 RX ADMIN — LIDOCAINE HYDROCHLORIDE 20 MG: 20 INJECTION, SOLUTION EPIDURAL; INFILTRATION; INTRACAUDAL; PERINEURAL at 07:48

## 2021-01-26 NOTE — H&P
Date of Surgery Update:  Yordan Kuo was seen and examined. History and physical has been reviewed. The patient has been examined.  There have been no significant clinical changes since the completion of the originally dated History and Physical.    Signed By: Octaviano Coto MD     January 26, 2021 7:30 AM

## 2021-01-26 NOTE — DISCHARGE INSTRUCTIONS
[facility]  [id number]  1945    COLON DISCHARGE INSTRUCTIONS  Discomfort:  Redness at IV site- apply warm compress to area; if redness or soreness persist- contact your physician  There may be a slight amount of blood passed from the rectum  Gaseous discomfort- walking, belching will help relieve any discomfort  You may not operate a vehicle for 12 hours  You may not engage in an occupation involving machinery or appliances for rest of today  You may not drink alcoholic beverages for at least 12 hours  Avoid making any critical decisions for at least 24 hour  DIET:   High fiber diet. - however -  remember your colon is empty and a heavy meal will produce gas. Avoid these foods:  vegetables, fried / greasy foods, carbonated drinks for today  MEDICATION:  (See attached)     ACTIVITY:  You may not resume your normal daily activities until tomorrow AM; it is recommended that you spend the remainder of the day resting -  avoid any strenuous activity. CALL M.D. ANY SIGN OF:   Increasing pain, nausea, vomiting  Abdominal distension (swelling)  New increased bleeding (oral or rectal)  Fever (chills)  Pain in chest area  Bloody discharge from nose or mouth  Shortness of breath    You may not  take any Advil, Aspirin, Ibuprofen, Motrin, Aleve, or Goodys for 10 days, ONLY  Tylenol as needed for pain.     IMPRESSION:  -- large polyp in the sigmoid    Follow-up Instructions:   Call Dr. Fidel Ag for the results of procedure / biopsy in 7-10 days  Appointment in *** days  Telephone # [phone]  Additional instructions ***  Repeat colonoscopy in *** years    Lalit Goldsmith MD

## 2021-01-26 NOTE — PROCEDURES
NAME:  Yvan Walker   :   1945   MRN:   [id number]     Date/Time:  2021 7:42 AM    Colonoscopy Operative Report    Procedure Type:  Colonoscopy with polypectomy (snare cautery), submucosal lift, ink injection     Indications: rectal bleeding  Pre-operative Diagnosis: see indication above  Post-operative Diagnosis:  See findings below  :  Jorge Bahena MD  Referring Provider: Ada Kay MD    Exam:  Airway: clear, no airway problems anticipated  Heart: RRR, without gallops or rubs  Lungs: clear bilaterally without wheezes, crackles, or rhonchi  Abdomen: soft, nontender, nondistended, bowel sounds present  Mental Status: awake, alert and oriented to person, place and time    Sedation:  MAC anesthesia Propofol  Procedure Details:  After informed consent was obtained with all risks and benefits of procedure explained and preoperative exam completed, the patient was taken to the endoscopy suite and placed in the left lateral decubitus position. Upon sequential sedation as per above, a digital rectal exam was performed demonstrating internal and external hemorrhoids. The Olympus videocolonoscope  was inserted in the rectum and carefully advanced to the cecum, which was identified by the ileocecal valve and appendiceal orifice. The quality of preparation was fair. The colonoscope was slowly withdrawn with careful evaluation between folds. Retroflexion in the rectum was completed demonstrating internal and external hemorrhoids. Findings:   ANUS: Anal exam reveals no masses but hemorrhoids, sphincter tone is normal.   RECTUM: Rectal exam reveals no masses but hemorrhoids. SIGMOID COLON:   -- 3.5 cm semi-pedunculated polyp with a broad base vs early neoplasm. Lifted well with Orise so attempted to remove using hot snare, which reqruied piecemeal removal. The central and deeper section of polyp base did not lift well though, and it could not be completely resected.    -- Polyectomy area was then tattooed, and hemoclip x 1 placed over downstream defect. -- a separate 0.4 cm semi-pedunculated polyp removed with cold snare. DESCENDING COLON: diverticulosis, but otherwise normal.   TRANSVERSE COLON: The mucosa is normal with good vascular pattern and without ulcers, diverticula, and polyps. ASCENDING COLON: The mucosa is normal with good vascular pattern and without ulcers, diverticula, and polyps. CECUM: The appendiceal orifice appears normal. The ileocecal valve appears normal.   TERMINAL ILEUM: The terminal ileum was not entered. Specimen Removed:  Sigmoid colon mass, sigmoid colon polyp  Complications:  None. EBL:     None. Impression:  -- large sigmoid polyp (between 25 - 30 cm from anal verge), rule out malignancy, incompletely removed in piecemeal fashion, despite submucosal injection and lift. There is residual polyp, which did not lift well, which is concerning for invasive neoplasm. Tattooed the mucosa area around polyp x 3 for future surgery vs surveillance. -- second small polyp removed from sigmoid  -- defect from polypectomy treated with hemoclip x 1  -- diverticulosis on left  -- internal and external hemorrhoids    Recommendations:   -- await pathology  -- anticipate surgery or at the last a repeat colonoscopy in 3 months  -- no anticoagulation for 7 days if possible, otherwise he will definitely have bleeding from polypectomy site  -- high fiber diet  -- repeat colonoscopy depending on pathology    Discharge Disposition:  Home in the company of a  when able to ambulate.       Delmon Romberg, MD

## 2021-01-26 NOTE — PROGRESS NOTES
Endoscope was pre-cleaned at the bedside immediately following procedure by [facility]. For medications administered by anesthesia, see anesthesia chart.

## 2021-01-26 NOTE — ANESTHESIA POSTPROCEDURE EVALUATION
Procedure(s):  COLONOSCOPY  INJECTION Orise x2, Tatoo  ENDOSCOPIC POLYPECTOMY  RESOLUTION CLIP.    total IV anesthesia    Anesthesia Post Evaluation        Patient location during evaluation: PACU  Note status: Adequate. Level of consciousness: responsive to verbal stimuli and sleepy but conscious  Pain management: satisfactory to patient  Airway patency: patent  Anesthetic complications: no  Cardiovascular status: acceptable  Respiratory status: acceptable  Hydration status: acceptable  Comments: +Post-Anesthesia Evaluation and Assessment    Patient: Jewels Butcher MRN: [id number]  SSN: [id number]   YOB: 1945  Age: 66 y.o. Sex: male      Cardiovascular Function/Vital Signs    /80   Pulse (!) 116   Temp 36.8 °C (98.3 °F)   Resp 26   Ht 5' 11\" (1.803 m)   Wt 106.7 kg (235 lb 3.2 oz)   SpO2 97%   BMI 32.80 kg/m²     Patient is status post Procedure(s) with comments:  COLONOSCOPY  INJECTION Orise x2, Tatoo - Orise x2, tatoo  ENDOSCOPIC POLYPECTOMY  RESOLUTION CLIP. Nausea/Vomiting: Controlled. Postoperative hydration reviewed and adequate. Pain:  Pain Scale 1: Numeric (0 - 10) (01/26/21 0735)  Pain Intensity 1: 0 (01/26/21 0735)   Managed. Neurological Status: At baseline. Mental Status and Level of Consciousness: Arousable. Pulmonary Status:   O2 Device: Room air (01/26/21 0837)   Adequate oxygenation and airway patent. Complications related to anesthesia: None    Post-anesthesia assessment completed. No concerns. Signed By: Shane Wu MD    1/26/2021  Post anesthesia nausea and vomiting:  controlled      INITIAL Post-op Vital signs: No vitals data found for the desired time range.

## 2021-01-26 NOTE — ANESTHESIA PREPROCEDURE EVALUATION
Relevant Problems   CARDIOVASCULAR   (+) Afib (HCC)   (+) Paroxysmal A-fib (HCC)       Anesthetic History   No history of anesthetic complications            Review of Systems / Medical History  Patient summary reviewed, nursing notes reviewed and pertinent labs reviewed    Pulmonary  Within defined limits                 Neuro/Psych   Within defined limits           Cardiovascular    Hypertension        Dysrhythmias : atrial fibrillation      Exercise tolerance: >4 METS  Comments:  Hx bradycardia     GI/Hepatic/Renal               Comments: RECTAL BLEEDING   CHRONIC CONSTIPATION Endo/Other  Within defined limits           Other Findings              Physical Exam    Airway  Mallampati: I    Neck ROM: normal range of motion   Mouth opening: Normal     Cardiovascular  Regular rate and rhythm,  S1 and S2 normal,  no murmur, click, rub, or gallop             Dental    Dentition: Caps/crowns  Comments: Some missing teeth, no loose teeth   Pulmonary  Breath sounds clear to auscultation               Abdominal  GI exam deferred       Other Findings            Anesthetic Plan    ASA: 3  Anesthesia type: total IV anesthesia          Induction: Intravenous  Anesthetic plan and risks discussed with: Patient

## 2021-01-27 ENCOUNTER — TELEPHONE (OUTPATIENT)
Dept: CARDIOLOGY CLINIC | Age: 76
End: 2021-01-27

## 2021-01-27 NOTE — TELEPHONE ENCOUNTER
Art Lucio with Dr. Rafael Mckenna office requesting if the pt can stay off blood thinners for 7 days.  Please advise        Phone:[phone]  XNJ:[phone]

## 2021-01-28 ENCOUNTER — TRANSCRIBE ORDER (OUTPATIENT)
Dept: SCHEDULING | Age: 76
End: 2021-01-28

## 2021-01-28 DIAGNOSIS — C18.7 CANCER OF SIGMOID COLON (HCC): Primary | ICD-10-CM

## 2021-01-28 NOTE — TELEPHONE ENCOUNTER
MD Rebecca Asher, RN   Caller: Unspecified Dayday Smith,  3:35 PM)             Yes this can be held for 7 days prior.  thx

## 2021-01-29 LAB — SARS-COV-2, COV2NT: NOT DETECTED

## 2021-02-02 ENCOUNTER — HOSPITAL ENCOUNTER (OUTPATIENT)
Dept: CT IMAGING | Age: 76
Discharge: HOME | End: 2021-02-02
Attending: INTERNAL MEDICINE
Payer: MEDICARE

## 2021-02-02 DIAGNOSIS — C18.7 CANCER OF SIGMOID COLON (HCC): ICD-10-CM

## 2021-02-02 LAB — CREAT BLD-MCNC: 0.7 MG/DL (ref 0.6–1.3)

## 2021-02-02 PROCEDURE — 82565 ASSAY OF CREATININE: CPT

## 2021-02-02 PROCEDURE — 74011000636 HC RX REV CODE- 636: Performed by: INTERNAL MEDICINE

## 2021-02-02 PROCEDURE — 74177 CT ABD & PELVIS W/CONTRAST: CPT

## 2021-02-02 RX ADMIN — IOHEXOL 50 ML: 240 INJECTION, SOLUTION INTRATHECAL; INTRAVASCULAR; INTRAVENOUS; ORAL at 10:52

## 2021-02-02 RX ADMIN — IOPAMIDOL 100 ML: 755 INJECTION, SOLUTION INTRAVENOUS at 10:52

## 2021-03-17 ENCOUNTER — TELEPHONE (OUTPATIENT)
Dept: CARDIOLOGY CLINIC | Age: 76
End: 2021-03-17

## 2021-03-17 ENCOUNTER — HOSPITAL ENCOUNTER (OUTPATIENT)
Dept: GENERAL RADIOLOGY | Age: 76
End: 2021-03-17
Attending: COLON & RECTAL SURGERY
Payer: MEDICARE

## 2021-03-17 ENCOUNTER — HOSPITAL ENCOUNTER (OUTPATIENT)
Dept: PREADMISSION TESTING | Age: 76
Discharge: HOME | End: 2021-03-17
Attending: COLON & RECTAL SURGERY
Payer: MEDICARE

## 2021-03-17 ENCOUNTER — HOSPITAL ENCOUNTER (OUTPATIENT)
Dept: GENERAL RADIOLOGY | Age: 76
Discharge: HOME | End: 2021-03-17
Attending: COLON & RECTAL SURGERY
Payer: MEDICARE

## 2021-03-17 VITALS
HEART RATE: 58 BPM | WEIGHT: 227.96 LBS | TEMPERATURE: 97.9 F | OXYGEN SATURATION: 98 % | RESPIRATION RATE: 16 BRPM | BODY MASS INDEX: 35.78 KG/M2 | HEIGHT: 67 IN | SYSTOLIC BLOOD PRESSURE: 132 MMHG | DIASTOLIC BLOOD PRESSURE: 63 MMHG

## 2021-03-17 LAB
ABO + RH BLD: NORMAL
ALBUMIN SERPL-MCNC: 4 G/DL (ref 3.5–5)
ALBUMIN/GLOB SERPL: 1 {RATIO} (ref 1.1–2.2)
ALP SERPL-CCNC: 88 U/L (ref 45–117)
ALT SERPL-CCNC: 21 U/L (ref 12–78)
ANION GAP SERPL CALC-SCNC: 7 MMOL/L (ref 5–15)
APPEARANCE UR: CLEAR
APTT PPP: 28.8 SEC (ref 22.1–31)
AST SERPL-CCNC: 15 U/L (ref 15–37)
ATRIAL RATE: 57 BPM
BACTERIA URNS QL MICRO: NEGATIVE /HPF
BILIRUB SERPL-MCNC: 0.9 MG/DL (ref 0.2–1)
BILIRUB UR QL CFM: NEGATIVE
BLOOD GROUP ANTIBODIES SERPL: NORMAL
BUN SERPL-MCNC: 19 MG/DL (ref 6–20)
BUN/CREAT SERPL: 24 (ref 12–20)
CALCIUM SERPL-MCNC: 9.4 MG/DL (ref 8.5–10.1)
CALCULATED P AXIS, ECG09: 84 DEGREES
CALCULATED R AXIS, ECG10: 23 DEGREES
CALCULATED T AXIS, ECG11: 40 DEGREES
CEA SERPL-MCNC: 2.7 NG/ML
CHLORIDE SERPL-SCNC: 103 MMOL/L (ref 97–108)
CO2 SERPL-SCNC: 25 MMOL/L (ref 21–32)
COLOR UR: ABNORMAL
CREAT SERPL-MCNC: 0.78 MG/DL (ref 0.7–1.3)
DIAGNOSIS, 93000: NORMAL
EPITH CASTS URNS QL MICRO: ABNORMAL /LPF
ERYTHROCYTE [DISTWIDTH] IN BLOOD BY AUTOMATED COUNT: 13.1 % (ref 11.5–14.5)
EST. AVERAGE GLUCOSE BLD GHB EST-MCNC: 108 MG/DL
GLOBULIN SER CALC-MCNC: 4.2 G/DL (ref 2–4)
GLUCOSE SERPL-MCNC: 108 MG/DL (ref 65–100)
GLUCOSE UR STRIP.AUTO-MCNC: NEGATIVE MG/DL
HBA1C MFR BLD: 5.4 % (ref 4–5.6)
HCT VFR BLD AUTO: 42.4 % (ref 36.6–50.3)
HGB BLD-MCNC: 14.1 G/DL (ref 12.1–17)
HGB UR QL STRIP: ABNORMAL
HYALINE CASTS URNS QL MICRO: ABNORMAL /LPF (ref 0–5)
INR PPP: 1.1 (ref 0.9–1.1)
KETONES UR QL STRIP.AUTO: ABNORMAL MG/DL
LEUKOCYTE ESTERASE UR QL STRIP.AUTO: ABNORMAL
MAGNESIUM SERPL-MCNC: 2 MG/DL (ref 1.6–2.4)
MCH RBC QN AUTO: 30.4 PG (ref 26–34)
MCHC RBC AUTO-ENTMCNC: 33.3 G/DL (ref 30–36.5)
MCV RBC AUTO: 91.4 FL (ref 80–99)
NITRITE UR QL STRIP.AUTO: NEGATIVE
NRBC # BLD: 0 K/UL (ref 0–0.01)
NRBC BLD-RTO: 0 PER 100 WBC
P-R INTERVAL, ECG05: 200 MS
PH UR STRIP: 6 [PH] (ref 5–8)
PHOSPHATE SERPL-MCNC: 3.9 MG/DL (ref 2.6–4.7)
PLATELET # BLD AUTO: 218 K/UL (ref 150–400)
PMV BLD AUTO: 10.9 FL (ref 8.9–12.9)
POTASSIUM SERPL-SCNC: 3.9 MMOL/L (ref 3.5–5.1)
PROT SERPL-MCNC: 8.2 G/DL (ref 6.4–8.2)
PROT UR STRIP-MCNC: NEGATIVE MG/DL
PROTHROMBIN TIME: 11.9 SEC (ref 9–11.1)
Q-T INTERVAL, ECG07: 480 MS
QRS DURATION, ECG06: 78 MS
QTC CALCULATION (BEZET), ECG08: 467 MS
RBC # BLD AUTO: 4.64 M/UL (ref 4.1–5.7)
RBC #/AREA URNS HPF: ABNORMAL /HPF (ref 0–5)
SODIUM SERPL-SCNC: 135 MMOL/L (ref 136–145)
SP GR UR REFRACTOMETRY: 1.02 (ref 1–1.03)
SPECIMEN EXP DATE BLD: NORMAL
THERAPEUTIC RANGE,PTTT: NORMAL SECS (ref 58–77)
UA: UC IF INDICATED,UAUC: ABNORMAL
UROBILINOGEN UR QL STRIP.AUTO: 1 EU/DL (ref 0.2–1)
VENTRICULAR RATE, ECG03: 57 BPM
WBC # BLD AUTO: 10.6 K/UL (ref 4.1–11.1)
WBC URNS QL MICRO: ABNORMAL /HPF (ref 0–4)

## 2021-03-17 PROCEDURE — 85027 COMPLETE CBC AUTOMATED: CPT

## 2021-03-17 PROCEDURE — 80053 COMPREHEN METABOLIC PANEL: CPT

## 2021-03-17 PROCEDURE — 83735 ASSAY OF MAGNESIUM: CPT

## 2021-03-17 PROCEDURE — 85610 PROTHROMBIN TIME: CPT

## 2021-03-17 PROCEDURE — 81001 URINALYSIS AUTO W/SCOPE: CPT

## 2021-03-17 PROCEDURE — 85730 THROMBOPLASTIN TIME PARTIAL: CPT

## 2021-03-17 PROCEDURE — 82378 CARCINOEMBRYONIC ANTIGEN: CPT

## 2021-03-17 PROCEDURE — 71046 X-RAY EXAM CHEST 2 VIEWS: CPT

## 2021-03-17 PROCEDURE — 84100 ASSAY OF PHOSPHORUS: CPT

## 2021-03-17 PROCEDURE — 36415 COLL VENOUS BLD VENIPUNCTURE: CPT

## 2021-03-17 PROCEDURE — 86901 BLOOD TYPING SEROLOGIC RH(D): CPT

## 2021-03-17 PROCEDURE — 83036 HEMOGLOBIN GLYCOSYLATED A1C: CPT

## 2021-03-17 PROCEDURE — 93005 ELECTROCARDIOGRAM TRACING: CPT

## 2021-03-17 RX ORDER — CELECOXIB 200 MG/1
200 CAPSULE ORAL ONCE
Status: CANCELLED | OUTPATIENT
Start: 2021-03-26 | End: 2021-03-26

## 2021-03-17 RX ORDER — GABAPENTIN 300 MG/1
600 CAPSULE ORAL ONCE
Status: CANCELLED | OUTPATIENT
Start: 2021-03-26 | End: 2021-03-26

## 2021-03-17 RX ORDER — ACETAMINOPHEN 500 MG
1000 TABLET ORAL ONCE
Status: CANCELLED | OUTPATIENT
Start: 2021-03-26 | End: 2021-03-26

## 2021-03-17 NOTE — PERIOP NOTES
Hibiclens/Chlorhexidine    Preventing Infections Before and After  Your Surgery    IMPORTANT INSTRUCTIONS    Please read and follow these instructions carefully. If you are unable to comply with the below instructions your procedure will be cancelled. Every Night for Three (3) nights before your surgery:  1. Shower with an antibacterial soap, such as Dial, or the soap provided at your preassessment appointment. A shower is better than a bath for cleaning your skin. 2. If needed, ask someone to help you reach all areas of your body. Dont forget to clean your belly button with every shower. The night before your surgery: If you lose your Hibiclens/chlorhexidine please contact surgery center or you can purchase it at a local pharmacy  1. On the night before your surgery, shower with an antibacterial soap, such as Dial, or the soap provided at your preassessment appointment. 2. With one packet of Hibiclens/Chlorhexidine in hand, turn water off.  3. Apply Hibiclens antiseptic skin cleanser with a clean, freshly washed washcloth. ? Gently apply to your body from chin to toes (except the genital area) and especially the area(s) where your incision(s) will be. ? Leave Hibiclens/Chlorhexidine on your skin for at least 20 seconds. CAUTION: If needed, Hibiclens/chlorhexidine may be used to clean the folds of skin of the legs (such as in the area of the groin) and on your buttocks and hips. However, do not use Hibiclens/Chlorhexidine above the neck or in the genital area (your bottom) or put inside any area of your body. 4. Turn the water back on and rinse. 5. Dry gently with a clean, freshly washed towel. 6. After your shower, do not use any powder, deodorant, perfumes or lotion. 7. Use clean, freshly washed towels and washcloths every time you shower. 8. Wear clean, freshly washed pajamas to bed the night before surgery. 9. Sleep on clean, freshly washed sheets.   10. Do not allow pets to sleep in your bed with you. The Morning of your surgery:  1. Shower again thoroughly with an antibacterial soap, such as Dial or the soap provided at your preassessment appointment. If needed, ask someone for help to reach all areas of your body. Dont forget to clean your belly button! Rinse. 2. Dry gently with a clean, freshly washed towel. 3. After your shower, do not use any powder, deodorant, perfumes or lotion prior to surgery. 4. Put on clean, freshly washed clothing. Tips to help prevent infections after your surgery:  1. Protect your surgical wound from germs:  ? Hand washing is the most important thing you and your caregivers can do to prevent infections. ? Keep your bandage clean and dry! ? Do not touch your surgical wound. 2. Use clean, freshly washed towels and washcloths every time you shower; do not share bath linens with others. 3. Until your surgical wound is healed, wear clothing and sleep on bed linens each day that are clean and freshly washed. 4. Do not allow pets to sleep in your bed with you or touch your surgical wound. 5. Do not smoke  smoking delays wound healing. This may be a good time to stop smoking. 6. If you have diabetes, it is important for you to manage your blood sugar levels properly before your surgery as well as after your surgery. Poorly managed blood sugar levels slow down wound healing and prevent you from healing completely. If you lose your Hibiclens/chlorhexidine, please call the [facility], or it is available for purchase at your pharmacy.                ___________________      ___________________      ________________  (Signature of Patient)          (Witness)                   (Date and Time)

## 2021-03-17 NOTE — TELEPHONE ENCOUNTER
Renee Salinas from [address] pre admit testing called and states patient is having a robotic colectomy on 3/26/21 and patient is on Eliquis and she needs to know instructions on holding it. Please advise.

## 2021-03-17 NOTE — PERIOP NOTES
[facility]  Preoperative Instructions        Surgery Date 3/26/2021    Time of Arrival 1000am  Contact# [phone]    1. On the day of your surgery, please report to the Surgical Services Registration Desk and sign in at your designated time. The Surgery Center is located to the right of the Emergency Room. 2. You must have someone with you to drive you home. You should not drive a car for 24 hours following surgery. Please make arrangements for a friend or family member to stay with you for the first 24 hours after your surgery. 3. Refer to your handbook for instructions on drinking liquids prior to surgery. 4. We recommend you do not drink any alcoholic beverages for 24 hours before and after your surgery. 5. Contact your surgeons office for instructions on the following medications: non-steroidal anti-inflammatory drugs (i.e. Advil, Aleve), vitamins, and supplements. (Some surgeons will want you to stop these medications prior to surgery and others may allow you to take them)  **If you are currently taking Plavix, Coumadin, Aspirin and/or other blood-thinning agents, contact your surgeon for instructions. ** Your surgeon will partner with the physician prescribing these medications to determine if it is safe to stop or if you need to continue taking. Please do not stop taking these medications without instructions from your surgeon    6. Wear comfortable clothes. Wear glasses instead of contacts. Do not bring any money or jewelry. Please bring picture ID, insurance card, and any prearranged co-payment or hospital payment. Do not wear make-up, particularly mascara the morning of your surgery. Do not wear nail polish, particularly if you are having foot /hand surgery. Wear your hair loose or down, no ponytails, buns, rebecca pins or clips. All body piercings must be removed.   Please shower with antibacterial soap for three consecutive days before and on the morning of surgery, but do not apply any lotions, powders or deodorants after the shower on the day of surgery. Please use a fresh towels after each shower. Please sleep in clean clothes and change bed linens the night before surgery. Please do not shave for 48 hours prior to surgery. Shaving of the face is acceptable. 7. You should understand that if you do not follow these instructions your surgery may be cancelled. If your physical condition changes (I.e. fever, cold or flu) please contact your surgeon as soon as possible. 8. It is important that you be on time. If a situation occurs where you may be late, please call [phone] (OR Holding Area). 9. If you have any questions and or problems, please call [phone] (Pre-admission Testing). 10. Your surgery time may be subject to change. You will receive a phone call the evening prior if your time changes. 11.  If having outpatient surgery, you must have someone to drive you here, stay with you during the duration of your stay, and to drive you home at time of discharge. 12.   In an effort to improve the efficiency, privacy, and safety for all of our Pre-op patients visitors are not allowed in the Holding area. Once you arrive and are registered your family/visitors will be asked to remain in your vehicle. The Pre-op staff will call you when they are ready for you to enter the building and will explain to you and your family/visitors that the Pre-op phase is beginning. At this time, if your procedure is outpatient, your family member will be asked to stay in their vehicle until the surgery is complete and you are ready for discharge. If you are going to be admitted after your surgery, once you are called to come inside the building, your family will be able to leave the parking lot.    At this time the staff will also ask for your designated spokesperson information in the event that the physician or staff need to provide an update or obtain any pertinent information. The designated spokesperson will be notified if the physician needs to speak to family during the pre-operative phase.and/or in the post op phase. Special Instructions: Follow all instructions given to you by your doctor. Read and review your ERAS book (3 ring binder) and bring with you to the hospital and all follow up appointments. Use your incentive spirometer everyday 20x a day and bring with you to the hospital.    Covid Testing  3/22/2021 arrive between 7am - 11:45am  - [address] [facility] side - drive up in front of [address] [place] [address] (next to [place] [address]) under the overhang. We recommend you self quarantine from time of testing til day of your surgery. TAKE ALL MEDICATIONS THE DAY OF SURGERY EXCEPT:   Vitamins and supplments.     ----Follow instructions given to you by Dr. Angelita Melgoza in regards to when to stop Eliquis----      I understand a pre-operative phone call will be made to verify my surgery time. In the event that I am not available, I give permission for a message to be left on my answering service and/or with another person?   yes         ___________________      __________   _________    (Signature of Patient)             (Witness)                (Date and Time)

## 2021-03-17 NOTE — PERIOP NOTES
Incentive Spirometer        Using the incentive spirometer helps expand the small air sacs of your lungs, helps you breathe deeply, and helps improve your lung function. Use your incentive spirometer twice a day (10 breaths each time) prior to surgery. How to Use Your Incentive Spirometer:  1. Hold the incentive spirometer in an upright position. 2. Breathe out as usual.   3. Place the mouthpiece in your mouth and seal your lips tightly around it. 4. Take a deep breath. Breathe in slowly and as deeply as possible. Keep the blue flow rate guide between the arrows. 5. Hold your breath as long as possible. Then exhale slowly and allow the piston to fall to the bottom of the column. 6. Rest for a few seconds and repeat steps one through five at least 10 times. PAT Tidal Volume_____2000_______  x_____2___________  Date_______3/17/2021__________    Mariel Barbers THE INCENTIVE SPIROMETER WITH YOU TO THE HOSPITAL ON THE DAY OF YOUR SURGERY. Opportunity given to ask and answer questions as well as to observe return demonstration.     Patient signature_____________________________    Witness____________________________

## 2021-03-18 LAB
BACTERIA SPEC CULT: NORMAL
BACTERIA SPEC CULT: NORMAL
SERVICE CMNT-IMP: NORMAL

## 2021-03-18 NOTE — TELEPHONE ENCOUNTER
MD Lavell Mares, RN   Caller: Unspecified (Yesterday, 12:13 PM)             Can be held 48 hrs prior. He is low risk for cardiac complications.  thx

## 2021-03-18 NOTE — TELEPHONE ENCOUNTER
9:52 AM   [id number] Left to call back if they have any questions.

## 2021-03-18 NOTE — PERIOP NOTES
Per anesthesia Dr. Swetha Ann use normal saline in place of lactated ringers day of surgery related to digitalis flag in [facility].

## 2021-03-19 NOTE — PERIOP NOTES
Patient called to PAT - clarified Eliquis is to be held 48hours prior to DOS per Dr. Katie Fong - patient stated understanding.

## 2021-03-22 ENCOUNTER — HOSPITAL ENCOUNTER (OUTPATIENT)
Dept: PREADMISSION TESTING | Age: 76
Discharge: HOME | End: 2021-03-22
Payer: MEDICARE

## 2021-03-22 LAB — SARS-COV-2, COV2: NORMAL

## 2021-03-22 PROCEDURE — U0003 INFECTIOUS AGENT DETECTION BY NUCLEIC ACID (DNA OR RNA); SEVERE ACUTE RESPIRATORY SYNDROME CORONAVIRUS 2 (SARS-COV-2) (CORONAVIRUS DISEASE [COVID-19]), AMPLIFIED PROBE TECHNIQUE, MAKING USE OF HIGH THROUGHPUT TECHNOLOGIES AS DESCRIBED BY CMS-2020-01-R: HCPCS

## 2021-03-22 RX ORDER — SODIUM CHLORIDE 9 MG/ML
25 INJECTION, SOLUTION INTRAVENOUS CONTINUOUS
Status: CANCELLED | OUTPATIENT
Start: 2021-03-26

## 2021-03-23 LAB — SARS-COV-2, COV2NT: NOT DETECTED

## 2021-03-25 ENCOUNTER — ANESTHESIA EVENT (OUTPATIENT)
Dept: SURGERY | Age: 76
DRG: 330 | End: 2021-03-25
Payer: MEDICARE

## 2021-03-26 ENCOUNTER — ANESTHESIA (OUTPATIENT)
Dept: SURGERY | Age: 76
DRG: 330 | End: 2021-03-26
Payer: MEDICARE

## 2021-03-26 ENCOUNTER — HOSPITAL ENCOUNTER (INPATIENT)
Age: 76
LOS: 3 days | Discharge: HOME | DRG: 330 | End: 2021-03-29
Attending: COLON & RECTAL SURGERY | Admitting: COLON & RECTAL SURGERY
Payer: MEDICARE

## 2021-03-26 DIAGNOSIS — C18.7 MALIGNANT NEOPLASM OF SIGMOID COLON (HCC): Primary | ICD-10-CM

## 2021-03-26 PROBLEM — C18.9 COLON CANCER (HCC): Status: ACTIVE | Noted: 2021-03-26

## 2021-03-26 PROCEDURE — 74011250637 HC RX REV CODE- 250/637: Performed by: COLON & RECTAL SURGERY

## 2021-03-26 PROCEDURE — 74011250636 HC RX REV CODE- 250/636: Performed by: ANESTHESIOLOGY

## 2021-03-26 PROCEDURE — 77030040361 HC SLV COMPR DVT MDII -B: Performed by: COLON & RECTAL SURGERY

## 2021-03-26 PROCEDURE — 77030034667 HC ACC PLATFRM ENDO GELPNT AMR -E: Performed by: COLON & RECTAL SURGERY

## 2021-03-26 PROCEDURE — 88341 IMHCHEM/IMCYTCHM EA ADD ANTB: CPT

## 2021-03-26 PROCEDURE — 0DJD8ZZ INSPECTION OF LOWER INTESTINAL TRACT, VIA NATURAL OR ARTIFICIAL OPENING ENDOSCOPIC: ICD-10-PCS | Performed by: COLON & RECTAL SURGERY

## 2021-03-26 PROCEDURE — 88360 TUMOR IMMUNOHISTOCHEM/MANUAL: CPT

## 2021-03-26 PROCEDURE — 77030016151 HC PROTCTR LNS DFOG COVD -B: Performed by: COLON & RECTAL SURGERY

## 2021-03-26 PROCEDURE — 77030002996 HC SUT SLK J&J -A: Performed by: COLON & RECTAL SURGERY

## 2021-03-26 PROCEDURE — 88309 TISSUE EXAM BY PATHOLOGIST: CPT

## 2021-03-26 PROCEDURE — 77030018832 HC SOL IRR H20 ICUM -A: Performed by: COLON & RECTAL SURGERY

## 2021-03-26 PROCEDURE — 88342 IMHCHEM/IMCYTCHM 1ST ANTB: CPT

## 2021-03-26 PROCEDURE — 76210000006 HC OR PH I REC 0.5 TO 1 HR: Performed by: COLON & RECTAL SURGERY

## 2021-03-26 PROCEDURE — 77030008771 HC TU NG SALEM SUMP -A: Performed by: NURSE ANESTHETIST, CERTIFIED REGISTERED

## 2021-03-26 PROCEDURE — 77030008756 HC TU IRR SUC STRY -B: Performed by: COLON & RECTAL SURGERY

## 2021-03-26 PROCEDURE — 74011250636 HC RX REV CODE- 250/636: Performed by: NURSE ANESTHETIST, CERTIFIED REGISTERED

## 2021-03-26 PROCEDURE — 77030005513 HC CATH URETH FOL11 MDII -B: Performed by: COLON & RECTAL SURGERY

## 2021-03-26 PROCEDURE — 88305 TISSUE EXAM BY PATHOLOGIST: CPT

## 2021-03-26 PROCEDURE — 77030037241 HC PRT ACC BLDLSS AIRSEAL CNMD -B: Performed by: COLON & RECTAL SURGERY

## 2021-03-26 PROCEDURE — 76010000881 HC OR TIME 4.5 TO 5HR INTENSV - TIER 2: Performed by: COLON & RECTAL SURGERY

## 2021-03-26 PROCEDURE — 77030020263 HC SOL INJ SOD CL0.9% LFCR 1000ML: Performed by: COLON & RECTAL SURGERY

## 2021-03-26 PROCEDURE — 77030032044: Performed by: COLON & RECTAL SURGERY

## 2021-03-26 PROCEDURE — 77030035489 HC REDUCR CANN ENDOWR INTU -C: Performed by: COLON & RECTAL SURGERY

## 2021-03-26 PROCEDURE — 77030039961 HC KT CUST COLON BSC -D: Performed by: COLON & RECTAL SURGERY

## 2021-03-26 PROCEDURE — 74011000254 HC RX REV CODE- 254: Performed by: NURSE ANESTHETIST, CERTIFIED REGISTERED

## 2021-03-26 PROCEDURE — 77030010507 HC ADH SKN DERMBND J&J -B: Performed by: COLON & RECTAL SURGERY

## 2021-03-26 PROCEDURE — 74011000258 HC RX REV CODE- 258: Performed by: COLON & RECTAL SURGERY

## 2021-03-26 PROCEDURE — 77030025171 HC FCPS ENDOSC DISP 2 J&J -B: Performed by: COLON & RECTAL SURGERY

## 2021-03-26 PROCEDURE — 77030002933 HC SUT MCRYL J&J -A: Performed by: COLON & RECTAL SURGERY

## 2021-03-26 PROCEDURE — 74011250636 HC RX REV CODE- 250/636: Performed by: COLON & RECTAL SURGERY

## 2021-03-26 PROCEDURE — 77030031139 HC SUT VCRL2 J&J -A: Performed by: COLON & RECTAL SURGERY

## 2021-03-26 PROCEDURE — 65270000029 HC RM PRIVATE

## 2021-03-26 PROCEDURE — 8E0W4CZ ROBOTIC ASSISTED PROCEDURE OF TRUNK REGION, PERCUTANEOUS ENDOSCOPIC APPROACH: ICD-10-PCS | Performed by: COLON & RECTAL SURGERY

## 2021-03-26 PROCEDURE — 77030025303 HC STPLR ENDOSC J&J -G: Performed by: COLON & RECTAL SURGERY

## 2021-03-26 PROCEDURE — 77030008684 HC TU ET CUF COVD -B: Performed by: NURSE ANESTHETIST, CERTIFIED REGISTERED

## 2021-03-26 PROCEDURE — 77030026438 HC STYL ET INTUB CARD -A: Performed by: NURSE ANESTHETIST, CERTIFIED REGISTERED

## 2021-03-26 PROCEDURE — 77030035279 HC SEAL VSL ENDOWR XI INTU -I2: Performed by: COLON & RECTAL SURGERY

## 2021-03-26 PROCEDURE — 77030002966 HC SUT PDS J&J -A: Performed by: COLON & RECTAL SURGERY

## 2021-03-26 PROCEDURE — 74011000250 HC RX REV CODE- 250: Performed by: COLON & RECTAL SURGERY

## 2021-03-26 PROCEDURE — 74011000250 HC RX REV CODE- 250: Performed by: NURSE ANESTHETIST, CERTIFIED REGISTERED

## 2021-03-26 PROCEDURE — 0DTN4ZZ RESECTION OF SIGMOID COLON, PERCUTANEOUS ENDOSCOPIC APPROACH: ICD-10-PCS | Performed by: COLON & RECTAL SURGERY

## 2021-03-26 PROCEDURE — 2709999900 HC NON-CHARGEABLE SUPPLY: Performed by: COLON & RECTAL SURGERY

## 2021-03-26 PROCEDURE — 2709999900 HC NON-CHARGEABLE SUPPLY

## 2021-03-26 PROCEDURE — 77030013079 HC BLNKT BAIR HGGR 3M -A: Performed by: NURSE ANESTHETIST, CERTIFIED REGISTERED

## 2021-03-26 PROCEDURE — 77030019908 HC STETH ESOPH SIMS -A: Performed by: NURSE ANESTHETIST, CERTIFIED REGISTERED

## 2021-03-26 PROCEDURE — 76060000040 HC ANESTHESIA 4.5 TO 5 HR: Performed by: COLON & RECTAL SURGERY

## 2021-03-26 PROCEDURE — 77030040260 HC STPLR RELD SUREFORM DVNCI INTU -C: Performed by: COLON & RECTAL SURGERY

## 2021-03-26 RX ORDER — ATENOLOL 25 MG/1
25 TABLET ORAL DAILY
Status: DISCONTINUED | OUTPATIENT
Start: 2021-03-27 | End: 2021-03-29 | Stop reason: HOSPADM

## 2021-03-26 RX ORDER — OXYCODONE AND ACETAMINOPHEN 5; 325 MG/1; MG/1
1 TABLET ORAL AS NEEDED
Status: DISCONTINUED | OUTPATIENT
Start: 2021-03-26 | End: 2021-03-26 | Stop reason: HOSPADM

## 2021-03-26 RX ORDER — SODIUM CHLORIDE 0.9 % (FLUSH) 0.9 %
5-40 SYRINGE (ML) INJECTION EVERY 8 HOURS
Status: DISCONTINUED | OUTPATIENT
Start: 2021-03-26 | End: 2021-03-26 | Stop reason: HOSPADM

## 2021-03-26 RX ORDER — SODIUM CHLORIDE 0.9 % (FLUSH) 0.9 %
5-40 SYRINGE (ML) INJECTION AS NEEDED
Status: DISCONTINUED | OUTPATIENT
Start: 2021-03-26 | End: 2021-03-26 | Stop reason: HOSPADM

## 2021-03-26 RX ORDER — KETAMINE HYDROCHLORIDE 50 MG/ML
INJECTION, SOLUTION INTRAMUSCULAR; INTRAVENOUS AS NEEDED
Status: DISCONTINUED | OUTPATIENT
Start: 2021-03-26 | End: 2021-03-26 | Stop reason: HOSPADM

## 2021-03-26 RX ORDER — SODIUM CHLORIDE, SODIUM LACTATE, POTASSIUM CHLORIDE, CALCIUM CHLORIDE 600; 310; 30; 20 MG/100ML; MG/100ML; MG/100ML; MG/100ML
125 INJECTION, SOLUTION INTRAVENOUS CONTINUOUS
Status: DISCONTINUED | OUTPATIENT
Start: 2021-03-26 | End: 2021-03-26 | Stop reason: HOSPADM

## 2021-03-26 RX ORDER — ONDANSETRON 4 MG/1
4 TABLET, ORALLY DISINTEGRATING ORAL
Status: DISCONTINUED | OUTPATIENT
Start: 2021-03-26 | End: 2021-03-29 | Stop reason: HOSPADM

## 2021-03-26 RX ORDER — FENTANYL CITRATE 50 UG/ML
INJECTION, SOLUTION INTRAMUSCULAR; INTRAVENOUS AS NEEDED
Status: DISCONTINUED | OUTPATIENT
Start: 2021-03-26 | End: 2021-03-26 | Stop reason: HOSPADM

## 2021-03-26 RX ORDER — LIDOCAINE HYDROCHLORIDE ANHYDROUS AND DEXTROSE MONOHYDRATE .8; 5 G/100ML; G/100ML
1 INJECTION, SOLUTION INTRAVENOUS CONTINUOUS
Status: DISCONTINUED | OUTPATIENT
Start: 2021-03-26 | End: 2021-03-27

## 2021-03-26 RX ORDER — DEXAMETHASONE SODIUM PHOSPHATE 4 MG/ML
INJECTION, SOLUTION INTRA-ARTICULAR; INTRALESIONAL; INTRAMUSCULAR; INTRAVENOUS; SOFT TISSUE AS NEEDED
Status: DISCONTINUED | OUTPATIENT
Start: 2021-03-26 | End: 2021-03-26 | Stop reason: HOSPADM

## 2021-03-26 RX ORDER — SODIUM CHLORIDE 9 MG/ML
25 INJECTION, SOLUTION INTRAVENOUS CONTINUOUS
Status: DISCONTINUED | OUTPATIENT
Start: 2021-03-26 | End: 2021-03-26 | Stop reason: HOSPADM

## 2021-03-26 RX ORDER — ROCURONIUM BROMIDE 10 MG/ML
INJECTION, SOLUTION INTRAVENOUS AS NEEDED
Status: DISCONTINUED | OUTPATIENT
Start: 2021-03-26 | End: 2021-03-26 | Stop reason: HOSPADM

## 2021-03-26 RX ORDER — ACETAMINOPHEN 325 MG/1
650 TABLET ORAL ONCE
Status: DISCONTINUED | OUTPATIENT
Start: 2021-03-26 | End: 2021-03-26 | Stop reason: HOSPADM

## 2021-03-26 RX ORDER — DIGOXIN 125 MCG
0.12 TABLET ORAL
Status: DISCONTINUED | OUTPATIENT
Start: 2021-03-27 | End: 2021-03-29 | Stop reason: HOSPADM

## 2021-03-26 RX ORDER — ACETAMINOPHEN 500 MG
1000 TABLET ORAL EVERY 6 HOURS
Status: DISCONTINUED | OUTPATIENT
Start: 2021-03-26 | End: 2021-03-29 | Stop reason: HOSPADM

## 2021-03-26 RX ORDER — LIDOCAINE HYDROCHLORIDE 20 MG/ML
INJECTION, SOLUTION EPIDURAL; INFILTRATION; INTRACAUDAL; PERINEURAL AS NEEDED
Status: DISCONTINUED | OUTPATIENT
Start: 2021-03-26 | End: 2021-03-26 | Stop reason: HOSPADM

## 2021-03-26 RX ORDER — KETOROLAC TROMETHAMINE 30 MG/ML
15 INJECTION, SOLUTION INTRAMUSCULAR; INTRAVENOUS EVERY 6 HOURS
Status: COMPLETED | OUTPATIENT
Start: 2021-03-26 | End: 2021-03-27

## 2021-03-26 RX ORDER — CELECOXIB 100 MG/1
100 CAPSULE ORAL 2 TIMES DAILY
Status: DISCONTINUED | OUTPATIENT
Start: 2021-03-27 | End: 2021-03-29 | Stop reason: HOSPADM

## 2021-03-26 RX ORDER — LIDOCAINE HYDROCHLORIDE 10 MG/ML
0.1 INJECTION, SOLUTION EPIDURAL; INFILTRATION; INTRACAUDAL; PERINEURAL AS NEEDED
Status: DISCONTINUED | OUTPATIENT
Start: 2021-03-26 | End: 2021-03-26 | Stop reason: HOSPADM

## 2021-03-26 RX ORDER — ACETAMINOPHEN 500 MG
1000 TABLET ORAL ONCE
Status: DISCONTINUED | OUTPATIENT
Start: 2021-03-26 | End: 2021-03-26

## 2021-03-26 RX ORDER — BUPIVACAINE HYDROCHLORIDE AND EPINEPHRINE 2.5; 5 MG/ML; UG/ML
INJECTION, SOLUTION EPIDURAL; INFILTRATION; INTRACAUDAL; PERINEURAL AS NEEDED
Status: DISCONTINUED | OUTPATIENT
Start: 2021-03-26 | End: 2021-03-26 | Stop reason: HOSPADM

## 2021-03-26 RX ORDER — DIPHENHYDRAMINE HYDROCHLORIDE 50 MG/ML
12.5 INJECTION, SOLUTION INTRAMUSCULAR; INTRAVENOUS AS NEEDED
Status: DISCONTINUED | OUTPATIENT
Start: 2021-03-26 | End: 2021-03-26 | Stop reason: HOSPADM

## 2021-03-26 RX ORDER — ONDANSETRON 2 MG/ML
4 INJECTION INTRAMUSCULAR; INTRAVENOUS AS NEEDED
Status: DISCONTINUED | OUTPATIENT
Start: 2021-03-26 | End: 2021-03-26 | Stop reason: HOSPADM

## 2021-03-26 RX ORDER — LIDOCAINE HYDROCHLORIDE ANHYDROUS AND DEXTROSE MONOHYDRATE .8; 5 G/100ML; G/100ML
INJECTION, SOLUTION INTRAVENOUS
Status: DISCONTINUED | OUTPATIENT
Start: 2021-03-26 | End: 2021-03-26 | Stop reason: HOSPADM

## 2021-03-26 RX ORDER — GABAPENTIN 300 MG/1
600 CAPSULE ORAL ONCE
Status: COMPLETED | OUTPATIENT
Start: 2021-03-26 | End: 2021-03-26

## 2021-03-26 RX ORDER — HYDROMORPHONE HYDROCHLORIDE 1 MG/ML
0.2 INJECTION, SOLUTION INTRAMUSCULAR; INTRAVENOUS; SUBCUTANEOUS
Status: DISCONTINUED | OUTPATIENT
Start: 2021-03-26 | End: 2021-03-26 | Stop reason: HOSPADM

## 2021-03-26 RX ORDER — MORPHINE SULFATE 2 MG/ML
2 INJECTION, SOLUTION INTRAMUSCULAR; INTRAVENOUS
Status: DISCONTINUED | OUTPATIENT
Start: 2021-03-26 | End: 2021-03-26 | Stop reason: HOSPADM

## 2021-03-26 RX ORDER — SODIUM CHLORIDE, SODIUM LACTATE, POTASSIUM CHLORIDE, CALCIUM CHLORIDE 600; 310; 30; 20 MG/100ML; MG/100ML; MG/100ML; MG/100ML
75 INJECTION, SOLUTION INTRAVENOUS CONTINUOUS
Status: DISCONTINUED | OUTPATIENT
Start: 2021-03-26 | End: 2021-03-27

## 2021-03-26 RX ORDER — OXYCODONE HYDROCHLORIDE 5 MG/1
5 TABLET ORAL
Status: DISCONTINUED | OUTPATIENT
Start: 2021-03-26 | End: 2021-03-29 | Stop reason: HOSPADM

## 2021-03-26 RX ORDER — GLYCOPYRROLATE 0.2 MG/ML
INJECTION INTRAMUSCULAR; INTRAVENOUS AS NEEDED
Status: DISCONTINUED | OUTPATIENT
Start: 2021-03-26 | End: 2021-03-26 | Stop reason: HOSPADM

## 2021-03-26 RX ORDER — SODIUM CHLORIDE, SODIUM LACTATE, POTASSIUM CHLORIDE, CALCIUM CHLORIDE 600; 310; 30; 20 MG/100ML; MG/100ML; MG/100ML; MG/100ML
INJECTION, SOLUTION INTRAVENOUS
Status: DISCONTINUED | OUTPATIENT
Start: 2021-03-26 | End: 2021-03-26 | Stop reason: HOSPADM

## 2021-03-26 RX ORDER — PROPOFOL 10 MG/ML
INJECTION, EMULSION INTRAVENOUS AS NEEDED
Status: DISCONTINUED | OUTPATIENT
Start: 2021-03-26 | End: 2021-03-26 | Stop reason: HOSPADM

## 2021-03-26 RX ORDER — PROPOFOL 10 MG/ML
INJECTION, EMULSION INTRAVENOUS
Status: DISCONTINUED | OUTPATIENT
Start: 2021-03-26 | End: 2021-03-26 | Stop reason: HOSPADM

## 2021-03-26 RX ORDER — FENTANYL CITRATE 50 UG/ML
50 INJECTION, SOLUTION INTRAMUSCULAR; INTRAVENOUS AS NEEDED
Status: DISCONTINUED | OUTPATIENT
Start: 2021-03-26 | End: 2021-03-26 | Stop reason: HOSPADM

## 2021-03-26 RX ORDER — CELECOXIB 200 MG/1
200 CAPSULE ORAL ONCE
Status: COMPLETED | OUTPATIENT
Start: 2021-03-26 | End: 2021-03-26

## 2021-03-26 RX ORDER — MAGNESIUM SULFATE HEPTAHYDRATE 40 MG/ML
INJECTION, SOLUTION INTRAVENOUS AS NEEDED
Status: DISCONTINUED | OUTPATIENT
Start: 2021-03-26 | End: 2021-03-26 | Stop reason: HOSPADM

## 2021-03-26 RX ORDER — FENTANYL CITRATE 50 UG/ML
25 INJECTION, SOLUTION INTRAMUSCULAR; INTRAVENOUS
Status: DISCONTINUED | OUTPATIENT
Start: 2021-03-26 | End: 2021-03-26 | Stop reason: HOSPADM

## 2021-03-26 RX ORDER — ONDANSETRON 2 MG/ML
INJECTION INTRAMUSCULAR; INTRAVENOUS AS NEEDED
Status: DISCONTINUED | OUTPATIENT
Start: 2021-03-26 | End: 2021-03-26 | Stop reason: HOSPADM

## 2021-03-26 RX ORDER — NALOXONE HYDROCHLORIDE 0.4 MG/ML
0.4 INJECTION, SOLUTION INTRAMUSCULAR; INTRAVENOUS; SUBCUTANEOUS AS NEEDED
Status: DISCONTINUED | OUTPATIENT
Start: 2021-03-26 | End: 2021-03-29 | Stop reason: HOSPADM

## 2021-03-26 RX ORDER — MIDAZOLAM HYDROCHLORIDE 1 MG/ML
0.5 INJECTION, SOLUTION INTRAMUSCULAR; INTRAVENOUS
Status: DISCONTINUED | OUTPATIENT
Start: 2021-03-26 | End: 2021-03-26 | Stop reason: HOSPADM

## 2021-03-26 RX ORDER — MIDAZOLAM HYDROCHLORIDE 1 MG/ML
1 INJECTION, SOLUTION INTRAMUSCULAR; INTRAVENOUS AS NEEDED
Status: DISCONTINUED | OUTPATIENT
Start: 2021-03-26 | End: 2021-03-26 | Stop reason: HOSPADM

## 2021-03-26 RX ORDER — INDOCYANINE GREEN AND WATER 25 MG
KIT INJECTION AS NEEDED
Status: DISCONTINUED | OUTPATIENT
Start: 2021-03-26 | End: 2021-03-26 | Stop reason: HOSPADM

## 2021-03-26 RX ADMIN — FENTANYL CITRATE 50 MCG: 50 INJECTION, SOLUTION INTRAMUSCULAR; INTRAVENOUS at 12:15

## 2021-03-26 RX ADMIN — ONDANSETRON HYDROCHLORIDE 4 MG: 2 INJECTION, SOLUTION INTRAMUSCULAR; INTRAVENOUS at 16:34

## 2021-03-26 RX ADMIN — PROPOFOL 50 MG: 10 INJECTION, EMULSION INTRAVENOUS at 13:19

## 2021-03-26 RX ADMIN — GABAPENTIN 600 MG: 300 CAPSULE ORAL at 11:00

## 2021-03-26 RX ADMIN — GLYCOPYRROLATE 0.2 MG: 0.2 INJECTION, SOLUTION INTRAMUSCULAR; INTRAVENOUS at 12:45

## 2021-03-26 RX ADMIN — LIDOCAINE HYDROCHLORIDE 2 MG/KG/HR: 8 INJECTION, SOLUTION INTRAVENOUS at 12:27

## 2021-03-26 RX ADMIN — SODIUM CHLORIDE 50 MCG/MIN: 900 INJECTION, SOLUTION INTRAVENOUS at 12:38

## 2021-03-26 RX ADMIN — INDOCYANINE GREEN 5 MG: KIT INTRAVENOUS at 15:32

## 2021-03-26 RX ADMIN — CEFOXITIN 2 G: 2 INJECTION, POWDER, FOR SOLUTION INTRAVENOUS at 12:41

## 2021-03-26 RX ADMIN — KETAMINE HYDROCHLORIDE 40 MG: 50 INJECTION, SOLUTION, CONCENTRATE INTRAMUSCULAR; INTRAVENOUS at 12:45

## 2021-03-26 RX ADMIN — ROCURONIUM BROMIDE 60 MG: 10 INJECTION INTRAVENOUS at 12:27

## 2021-03-26 RX ADMIN — PROPOFOL 200 MCG/KG/MIN: 10 INJECTION, EMULSION INTRAVENOUS at 12:27

## 2021-03-26 RX ADMIN — NALOXEGOL OXALATE 25 MG: 25 TABLET, FILM COATED ORAL at 11:00

## 2021-03-26 RX ADMIN — CELECOXIB 200 MG: 200 CAPSULE ORAL at 11:00

## 2021-03-26 RX ADMIN — ACETAMINOPHEN 1000 MG: 500 TABLET, FILM COATED ORAL at 20:15

## 2021-03-26 RX ADMIN — ROCURONIUM BROMIDE 20 MG: 10 INJECTION INTRAVENOUS at 13:09

## 2021-03-26 RX ADMIN — MAGNESIUM SULFATE IN WATER 2 G: 40 INJECTION, SOLUTION INTRAVENOUS at 12:43

## 2021-03-26 RX ADMIN — SODIUM CHLORIDE, POTASSIUM CHLORIDE, SODIUM LACTATE AND CALCIUM CHLORIDE: 600; 310; 30; 20 INJECTION, SOLUTION INTRAVENOUS at 12:22

## 2021-03-26 RX ADMIN — CEFOXITIN 2 G: 2 INJECTION, POWDER, FOR SOLUTION INTRAVENOUS at 16:17

## 2021-03-26 RX ADMIN — ROCURONIUM BROMIDE 20 MG: 10 INJECTION INTRAVENOUS at 13:48

## 2021-03-26 RX ADMIN — DEXAMETHASONE SODIUM PHOSPHATE 8 MG: 4 INJECTION, SOLUTION INTRAMUSCULAR; INTRAVENOUS at 12:41

## 2021-03-26 RX ADMIN — SUGAMMADEX 200 MG: 100 INJECTION, SOLUTION INTRAVENOUS at 16:29

## 2021-03-26 RX ADMIN — FENTANYL CITRATE 50 MCG: 50 INJECTION, SOLUTION INTRAMUSCULAR; INTRAVENOUS at 12:22

## 2021-03-26 RX ADMIN — SODIUM CHLORIDE, POTASSIUM CHLORIDE, SODIUM LACTATE AND CALCIUM CHLORIDE 75 ML/HR: 600; 310; 30; 20 INJECTION, SOLUTION INTRAVENOUS at 17:03

## 2021-03-26 RX ADMIN — ROCURONIUM BROMIDE 30 MG: 10 INJECTION INTRAVENOUS at 14:45

## 2021-03-26 RX ADMIN — PROPOFOL: 10 INJECTION, EMULSION INTRAVENOUS at 13:37

## 2021-03-26 RX ADMIN — Medication 3 AMPULE: at 11:30

## 2021-03-26 RX ADMIN — KETOROLAC TROMETHAMINE 15 MG: 30 INJECTION, SOLUTION INTRAMUSCULAR at 20:15

## 2021-03-26 RX ADMIN — ROCURONIUM BROMIDE 20 MG: 10 INJECTION INTRAVENOUS at 14:16

## 2021-03-26 RX ADMIN — SODIUM CHLORIDE 25 ML/HR: 900 INJECTION, SOLUTION INTRAVENOUS at 11:00

## 2021-03-26 RX ADMIN — PROPOFOL 120 MG: 10 INJECTION, EMULSION INTRAVENOUS at 12:27

## 2021-03-26 RX ADMIN — ROCURONIUM BROMIDE 10 MG: 10 INJECTION INTRAVENOUS at 15:48

## 2021-03-26 RX ADMIN — LIDOCAINE HYDROCHLORIDE 100 MG: 20 INJECTION, SOLUTION INTRAVENOUS at 12:27

## 2021-03-26 RX ADMIN — CEFOXITIN 2 G: 2 INJECTION, POWDER, FOR SOLUTION INTRAVENOUS at 14:22

## 2021-03-26 RX ADMIN — Medication 1 AMPULE: at 20:15

## 2021-03-26 RX ADMIN — HYDROMORPHONE HYDROCHLORIDE 0.2 MG: 1 INJECTION, SOLUTION INTRAMUSCULAR; INTRAVENOUS; SUBCUTANEOUS at 17:50

## 2021-03-26 RX ADMIN — SODIUM CHLORIDE, POTASSIUM CHLORIDE, SODIUM LACTATE AND CALCIUM CHLORIDE: 600; 310; 30; 20 INJECTION, SOLUTION INTRAVENOUS at 12:15

## 2021-03-26 RX ADMIN — ACETAMINOPHEN 975 MG: 325 SOLUTION ORAL at 12:00

## 2021-03-26 NOTE — H&P
Colon and Rectal Surgery History and Physical    Subjective:      Earlene Masters is a 66 y.o. male who has a history of sigmoid colon cancer    Patient Active Problem List    Diagnosis Date Noted    Paroxysmal A-fib ([facility] 02/25/2016    Afib ([facility] 01/24/2013     Past Medical History:   Diagnosis Date    Arrhythmia     a-fib    Cancer ([facility] [address] 03/2021    Colon    Essential hypertension       Past Surgical History:   Procedure Laterality Date    COLONOSCOPY N/A 1/26/2021    COLONOSCOPY performed by Ayse Tena MD at [facility] ENDOSCOPY    COLONOSCOPY,REMV Valerie Panda  1/26/2021         COLONOSCPY,FLEX,W/ [address] INJECT  1/26/2021         HX TONSILLECTOMY  as a child      Social History     Tobacco Use    Smoking status: Never Smoker    Smokeless tobacco: Never Used   Substance Use Topics    Alcohol use: Not Currently     Comment: >5yrs      Family History   Problem Relation Age of Onset    Coronary Artery Disease Paternal Uncle     Dementia Mother     Heart Disease Father     Dementia Father       Prior to Admission medications    Medication Sig Start Date End Date Taking? Authorizing Provider   digoxin (LANOXIN) 0.125 mg tablet TAKE 1 TABLET EVERY MONDAY, [facility] AND FRIDAY 4/14/20  Yes Paul Spring MD   atenoloL (TENORMIN) 25 mg tablet TAKE 1 TABLET DAILY 4/14/20  Yes Paul Spring MD   apixaban (Eliquis) 5 mg tablet TAKE 1 TABLET TWICE A DAY 4/14/20   Paul Spring MD   coenzyme q10-vitamin e (COQ10 ) 100-100 mg-unit cap Take 1 Tab by mouth daily. Other, MD Nguyễn     No Known Allergies     Review of Systems:    A comprehensive review of systems was negative except for that written in the History of Present Illness.     Objective:     Visit Vitals  BP (!) 144/81 (BP 1 Location: Right arm, BP Patient Position: At rest)   Pulse 60   Temp 98.5 °F (36.9 °C)   Resp 15   Ht 5' 6.5\" (1.689 m)   Wt 102.4 kg (225 lb 12 oz)   SpO2 95%   BMI 35.89 kg/m²        Physical Exam:   General:  Alert, cooperative, no distress, appears stated age. Head:  Normocephalic, without obvious abnormality, atraumatic. Eyes:  Conjunctivae/corneas clear. PERRL, EOMs intact. Ears:  Normal external ear canals both ears. Nose: Nares normal. Septum midline. No drainage. Throat: Lips, mucosa, and tongue normal. Teeth and gums normal.   Neck: Supple, symmetrical, trachea midline, no adenopathy   Back:   Symmetric, no curvature. ROM normal.   Lungs:   Clear   Chest wall:  No tenderness or deformity. Heart:  Regular rate and rhythm       Abdomen:   Soft, non-tender. Bowel sounds normal. No masses,  No organomegaly. Genitalia:  deferred       Extremities: Extremities normal, atraumatic, no cyanosis or edema. Skin: Skin color, texture, turgor normal. No rashes or lesions. Neurologic: CNII-XII intact. Normal strength, sensation and reflexes throughout. Imaging:  images and reports reviewed    Lab Review:  No results found for this or any previous visit (from the past 24 hour(s)). Labs and radiology: images and reports reviewed      Assessment:   Sigmoid colon cancer    Plan:     1. I recommend proceeding with sigmoid resection. Treatment alternatives were discussed. 2. Discussed aspects of surgical intervention, methods, risks (including by not limited to infection, bleeding, hematoma, and perforation of the intestines or solid organs), and the risks of general anesthetic. The patient understands the risks; any and all questions were answered to the patient's satisfaction.     Signed By: Augusto Aguilar MD     March 26, 2021

## 2021-03-26 NOTE — ANESTHESIA POSTPROCEDURE EVALUATION
Procedure(s):  ROBOTIC SIGMOID COLECTOMY (E R A S). general    Anesthesia Post Evaluation        Patient location during evaluation: PACU  Note status: Adequate. Level of consciousness: responsive to verbal stimuli and sleepy but conscious  Pain management: satisfactory to patient  Airway patency: patent  Anesthetic complications: no  Cardiovascular status: acceptable  Respiratory status: acceptable  Hydration status: acceptable  Comments: +Post-Anesthesia Evaluation and Assessment    Patient: Shanae Waller MRN: [id number]  SSN: [id number]   YOB: 1945  Age: 66 y.o. Sex: male      Cardiovascular Function/Vital Signs    /67   Pulse (!) 58   Temp 36.6 °C (97.8 °F)   Resp 20   Ht 5' 6.5\" (1.689 m)   Wt 102.4 kg (225 lb 12 oz)   SpO2 100%   BMI 35.89 kg/m²     Patient is status post Procedure(s):  ROBOTIC SIGMOID COLECTOMY (E R A S). Nausea/Vomiting: Controlled. Postoperative hydration reviewed and adequate. Pain:  Pain Scale 1: Numeric (0 - 10) (03/26/21 1715)  Pain Intensity 1: 0 (03/26/21 1715)   Managed. Neurological Status:   Neuro (WDL): Within Defined Limits (03/26/21 1715)   At baseline. Mental Status and Level of Consciousness: Arousable. Pulmonary Status:   O2 Device: Nasal cannula (03/26/21 1715)   Adequate oxygenation and airway patent. Complications related to anesthesia: None    Post-anesthesia assessment completed. No concerns. Signed By: Karyn Johns MD    3/26/2021  Post anesthesia nausea and vomiting:  controlled      INITIAL Post-op Vital signs:   Vitals Value Taken Time   /67 03/26/21 1715   Temp 36.6 °C (97.8 °F) 03/26/21 1715   Pulse 56 03/26/21 1731   Resp 18 03/26/21 1731   SpO2 100 % 03/26/21 1731   Vitals shown include unvalidated device data.

## 2021-03-26 NOTE — PERIOP NOTES
Handoff Report from Operating Room to PACU    Report received from [address] [place] [address]  and Nnamdi Newell CRNA regarding Omi Cai. Surgeon(s):  Rafaela Garcia MD  And Procedure(s) (LRB):  ROBOTIC SIGMOID COLECTOMY (E R A S) (N/A)  confirmed   with drains and dressings discussed. Anesthesia type, drugs, patient history, complications, estimated blood loss, vital signs, intake and output, and last pain medication and reversal medications were reviewed.

## 2021-03-26 NOTE — BRIEF OP NOTE
Brief Postoperative Note    Patient: Omi Cai  YOB: 1945  MRN: [id number]    Date of Procedure: 3/26/2021     Pre-Op Diagnosis: COLON CANCER    Post-Op Diagnosis: Same as preoperative diagnosis.       Procedure(s):  ROBOTIC SIGMOID COLECTOMY (E R A S)    Surgeon(s):  Neptali Cespedes MD    Surgical Assistant: Surg Asst-1: Tiffanie Ruiz    Anesthesia: General     Estimated Blood Loss (mL): less than 50     Complications: None    Specimens:   ID Type Source Tests Collected by Time Destination   1 : Proximal donut Preservative Colon  Neptali Cespedes MD 3/26/2021 1527 Pathology   2 : Distal donut Preservative Colon  Neptali Cespedes MD 3/26/2021 1529 Pathology   3 : Sigmoid colon Preservative Colon  Neptali Cespedes MD 3/26/2021 1534 Pathology        Implants: * No implants in log *    Drains:   Orogastric Tube 03/26/21 (Active)       Findings: transmural sigmoid colon mass    Electronically Signed by Nithya Aldana MD on 3/26/2021 at 4:15 PM

## 2021-03-26 NOTE — PERIOP NOTES
covid test negative wears mask in public. Pt states no s.s covid virus nor has he been around anyone with the covid virus. kay completed. Pt has problems swallowing pills I spoke with pharmacy to make sure it ws safe to crush medicine and they switched the tyenool to liquid. Pt able to take meds in liquids form  See mar.

## 2021-03-26 NOTE — PERIOP NOTES
TRANSFER - OUT REPORT:    Verbal report given to [facility] (name) on Jan F Ayala  being transferred to [facility](unit) for routine post - op       Report consisted of patients Situation, Background, Assessment and   Recommendations(SBAR). Information from the following report(s) SBAR, Kardex, OR Summary, Intake/Output and MAR was reviewed with the receiving nurse. Opportunity for questions and clarification was provided.       Patient transported with:   O2 @ 3 liters  Registered Nurse  [facility]

## 2021-03-26 NOTE — OP NOTES
Preop diagnosis: sigmoid malignant polyp  Postop diagnosis: same, sigmoid colon cancer  Procedure: Robotic sigmoid colectomy  Surgeon: Dr. Celeste Cordial  Anesthesia: General +local  EBL: 25cc  Drains:none  Specimen: Sigmoid colon +proximal and distal anastomotic donuts. Stitch marks proximal margin  Implants: none  Assistant:   Complications: none apparent    Indications: This is a patient with a malignant polyp. Procedure: The patient was consented and taken to the operating room. The patient was placed on the table in the normal supine position. After smooth induction of anesthesia, the patient was placed in lithotomy position and a rectal irrigation was performed. A bottom and top prep was performed and the patient was prepped and draped in the usual sterile fashion. Time out was performed and the patient received appropriate antibiotic prophylaxis. Local anesthetic was used and a right lower quadrant incision was made. It was carried down to the peritoneum taking care to avoid injuring muscle. The peritoneum was entered taking care to avoid injuring bowel. A gel port was placed and the 29mm anvil was placed with spike and suture attached. Pneumoperitoneum was created and the 8mm camera port and 8mm working ports were placed. The assistant port was an 8mm air seal port and was placed in the right lateral position. The robot was docked over the left hip. The patient was placed in steep trendelenberg position with the left side up. I did not need to mobilize the splenic flexure to get adequate reach to the pelvis. I dissected out the sigmoid colon. I took the mesentery with vessel sealer. I took STANLEY with robotic white load on the stapler. I dissected the sigmoid colon down to the rectosigmoid junction. I stapled across the rectum with a blue load on the stapler. I injected firefly to make sure there was good blood supply to the rectal stump landing zone and the proximal margin.  I created a colotomy and passed the anvil with spike proximally. I stapled across vascularized colon with a blue load on the stapler. The suture and spike were pulled through the staple line. The spike was removed. The sigmoid colotomy was closed with 2-0 silk in a pursestring fashion. The anastomosis was created passing the stapler through the rectal staple line. The donuts were intact. The anastomosis was leak tested with a flexible sigmoidoscopy. The specimen was removed through the right lower quadrant port. I opened the specimen on the back table. There was tumor on the serosal surface of the sigmoid colon corresponding to a discoid mass. I announced \"time to close\" and the patient was re-draped and a closing tray was used. The right lower quadrant incision was closed with 3-0 vicryl in the peritoneum and #1PDS to close the fascia. 3-0 vicryl was used to close the deep dermal layer. All incisions were irrigated and closed using 4-0 monocryl. Dermabond was used as a sterile dressing. Instrument and sponge counts were correct x2. The patient was transferred to the recovery room in stable condition.

## 2021-03-26 NOTE — PROGRESS NOTES
Bedside shift change report given to DANIEL Hensley (oncoming nurse) by Abel Barr RN (offgoing nurse). Report included the following information SBAR, Kardex, Intake/Output, MAR and Recent Results. Pt admitted near end of shift. Pt made comfortable and vital obtained.

## 2021-03-26 NOTE — ANESTHESIA PREPROCEDURE EVALUATION
Relevant Problems   CARDIOVASCULAR   (+) Afib (HCC)   (+) Paroxysmal A-fib (HCC)       Anesthetic History   No history of anesthetic complications            Review of Systems / Medical History  Patient summary reviewed, nursing notes reviewed and pertinent labs reviewed    Pulmonary  Within defined limits                 Neuro/Psych   Within defined limits           Cardiovascular  Within defined limits  Hypertension: well controlled        Dysrhythmias : atrial fibrillation      Exercise tolerance: >4 METS  Comments: SR now   GI/Hepatic/Renal  Within defined limits              Endo/Other  Within defined limits      Obesity     Other Findings            Physical Exam    Airway  Mallampati: II  TM Distance: > 6 cm  Neck ROM: normal range of motion   Mouth opening: Normal     Cardiovascular  Regular rate and rhythm,  S1 and S2 normal,  no murmur, click, rub, or gallop             Dental    Dentition: Poor dentition     Pulmonary  Breath sounds clear to auscultation               Abdominal  GI exam deferred       Other Findings            Anesthetic Plan    ASA: 3  Anesthesia type: general          Induction: Intravenous  Anesthetic plan and risks discussed with: Patient

## 2021-03-27 LAB
ANION GAP SERPL CALC-SCNC: 5 MMOL/L (ref 5–15)
BUN SERPL-MCNC: 9 MG/DL (ref 6–20)
BUN/CREAT SERPL: 10 (ref 12–20)
CALCIUM SERPL-MCNC: 9 MG/DL (ref 8.5–10.1)
CHLORIDE SERPL-SCNC: 104 MMOL/L (ref 97–108)
CO2 SERPL-SCNC: 27 MMOL/L (ref 21–32)
CREAT SERPL-MCNC: 0.89 MG/DL (ref 0.7–1.3)
ERYTHROCYTE [DISTWIDTH] IN BLOOD BY AUTOMATED COUNT: 12.9 % (ref 11.5–14.5)
GLUCOSE SERPL-MCNC: 182 MG/DL (ref 65–100)
HCT VFR BLD AUTO: 38.6 % (ref 36.6–50.3)
HGB BLD-MCNC: 12.8 G/DL (ref 12.1–17)
MAGNESIUM SERPL-MCNC: 2.3 MG/DL (ref 1.6–2.4)
MCH RBC QN AUTO: 30 PG (ref 26–34)
MCHC RBC AUTO-ENTMCNC: 33.2 G/DL (ref 30–36.5)
MCV RBC AUTO: 90.4 FL (ref 80–99)
NRBC # BLD: 0 K/UL (ref 0–0.01)
NRBC BLD-RTO: 0 PER 100 WBC
PHOSPHATE SERPL-MCNC: 3.7 MG/DL (ref 2.6–4.7)
PLATELET # BLD AUTO: 173 K/UL (ref 150–400)
PMV BLD AUTO: 11.4 FL (ref 8.9–12.9)
POTASSIUM SERPL-SCNC: 4.1 MMOL/L (ref 3.5–5.1)
RBC # BLD AUTO: 4.27 M/UL (ref 4.1–5.7)
SODIUM SERPL-SCNC: 136 MMOL/L (ref 136–145)
WBC # BLD AUTO: 21.7 K/UL (ref 4.1–11.1)

## 2021-03-27 PROCEDURE — 80048 BASIC METABOLIC PNL TOTAL CA: CPT

## 2021-03-27 PROCEDURE — 74011250637 HC RX REV CODE- 250/637: Performed by: COLON & RECTAL SURGERY

## 2021-03-27 PROCEDURE — 84100 ASSAY OF PHOSPHORUS: CPT

## 2021-03-27 PROCEDURE — 74011250636 HC RX REV CODE- 250/636: Performed by: COLON & RECTAL SURGERY

## 2021-03-27 PROCEDURE — 36415 COLL VENOUS BLD VENIPUNCTURE: CPT

## 2021-03-27 PROCEDURE — 83735 ASSAY OF MAGNESIUM: CPT

## 2021-03-27 PROCEDURE — 85027 COMPLETE CBC AUTOMATED: CPT

## 2021-03-27 PROCEDURE — 77010033678 HC OXYGEN DAILY

## 2021-03-27 PROCEDURE — 65270000029 HC RM PRIVATE

## 2021-03-27 RX ADMIN — ACETAMINOPHEN 1000 MG: 500 TABLET, FILM COATED ORAL at 14:26

## 2021-03-27 RX ADMIN — ACETAMINOPHEN 1000 MG: 500 TABLET, FILM COATED ORAL at 06:43

## 2021-03-27 RX ADMIN — ACETAMINOPHEN 1000 MG: 500 TABLET, FILM COATED ORAL at 01:40

## 2021-03-27 RX ADMIN — CELECOXIB 100 MG: 100 CAPSULE ORAL at 08:39

## 2021-03-27 RX ADMIN — SODIUM CHLORIDE, POTASSIUM CHLORIDE, SODIUM LACTATE AND CALCIUM CHLORIDE 75 ML/HR: 600; 310; 30; 20 INJECTION, SOLUTION INTRAVENOUS at 06:53

## 2021-03-27 RX ADMIN — Medication 1 AMPULE: at 21:41

## 2021-03-27 RX ADMIN — Medication 1 AMPULE: at 08:44

## 2021-03-27 RX ADMIN — CELECOXIB 100 MG: 100 CAPSULE ORAL at 18:06

## 2021-03-27 RX ADMIN — DIGOXIN 0.12 MG: 125 TABLET ORAL at 08:39

## 2021-03-27 RX ADMIN — KETOROLAC TROMETHAMINE 15 MG: 30 INJECTION, SOLUTION INTRAMUSCULAR at 08:39

## 2021-03-27 RX ADMIN — KETOROLAC TROMETHAMINE 15 MG: 30 INJECTION, SOLUTION INTRAMUSCULAR at 14:26

## 2021-03-27 RX ADMIN — ACETAMINOPHEN 1000 MG: 500 TABLET, FILM COATED ORAL at 18:06

## 2021-03-27 RX ADMIN — ATENOLOL 25 MG: 25 TABLET ORAL at 08:39

## 2021-03-27 RX ADMIN — KETOROLAC TROMETHAMINE 15 MG: 30 INJECTION, SOLUTION INTRAMUSCULAR at 03:20

## 2021-03-27 RX ADMIN — NALOXEGOL OXALATE 12.5 MG: 12.5 TABLET, FILM COATED ORAL at 06:42

## 2021-03-27 NOTE — PROGRESS NOTES
CIWA score 14 End of Shift Note    Bedside shift change report given to Meaghan Marshall RN (oncoming nurse) by Randi Jerome RN (offgoing nurse). Report included the following information SBAR, Kardex, Intake/Output, MAR and Recent Results    Shift worked:  [id number]     Shift summary and any significant changes:     Pt up to chair for all meals and most of day. Tolerating ambulation in hallway. Pt passing flatus and x1 BM this afternoon. No complaints of pain; managed with scheduled ERAS meds. Tolerating diet well. Requires meds crushed in applesauce. Wife at bedside and ambulated in hallway with patient. Sharif removed this AM and pt voiding appropriately. Concerns for physician to address:  none     Zone phone for oncoming shift:   [phone]       Activity:  Activity Level: Up with Assistance  Number times ambulated in hallways past shift: 4  Number of times OOB to chair past shift: 3    Cardiac:   Cardiac Monitoring: No      Cardiac Rhythm: Sinus bradycardia    Access:   Current line(s): PIV     Genitourinary:   Urinary status: voiding    Respiratory:   O2 Device: Room air  Chronic home O2 use?: NO  Incentive spirometer at bedside: YES     GI:  Last Bowel Movement Date: 03/26/21  Current diet:  DIET REGULAR Low Fiber  DIET NUTRITIONAL SUPPLEMENTS Breakfast, Lunch; Ensure Enlive  Passing flatus: YES  Tolerating current diet: YES  % Diet Eaten: 85 %    Pain Management:   Patient states pain is manageable on current regimen: YES    Skin:  Sekou Score: 22  Interventions: float heels and increase time out of bed    Patient Safety:  Fall Score:  Total Score: 2  Interventions: assistive device (walker, cane, etc), gripper socks and pt to call before getting OOB       Length of Stay:  Expected LOS: - - -  Actual LOS: David Rosado RN

## 2021-03-27 NOTE — PROGRESS NOTES
CRS POD#1 s/p sigmoid colectomy    Up to chair. Pain controlled. No nausea or vomiting. No flatus or BM but feels \"rumbling\"    /60   Pulse 62   Temp 98.4 °F (36.9 °C)   Resp 16   Ht 5' 6.5\" (1.689 m)   Wt 102.4 kg (225 lb 12 oz)   SpO2 98%   BMI 35.89 kg/m²     NAD, AAOx3  Abd soft, approp TTP  Incisions c/d/i    Plan  D/c IVF and lido gtt  Ambulate  Diet as tolerated.   Await return of bowel function

## 2021-03-28 PROCEDURE — 74011250637 HC RX REV CODE- 250/637: Performed by: COLON & RECTAL SURGERY

## 2021-03-28 PROCEDURE — 65270000029 HC RM PRIVATE

## 2021-03-28 PROCEDURE — 74011250636 HC RX REV CODE- 250/636: Performed by: COLON & RECTAL SURGERY

## 2021-03-28 RX ORDER — ENOXAPARIN SODIUM 100 MG/ML
40 INJECTION SUBCUTANEOUS EVERY 24 HOURS
Status: DISCONTINUED | OUTPATIENT
Start: 2021-03-28 | End: 2021-03-29 | Stop reason: HOSPADM

## 2021-03-28 RX ADMIN — Medication 1 AMPULE: at 08:48

## 2021-03-28 RX ADMIN — CELECOXIB 100 MG: 100 CAPSULE ORAL at 08:47

## 2021-03-28 RX ADMIN — ACETAMINOPHEN 1000 MG: 500 TABLET, FILM COATED ORAL at 12:38

## 2021-03-28 RX ADMIN — Medication 1 AMPULE: at 21:00

## 2021-03-28 RX ADMIN — ACETAMINOPHEN 1000 MG: 500 TABLET, FILM COATED ORAL at 05:44

## 2021-03-28 RX ADMIN — CELECOXIB 100 MG: 100 CAPSULE ORAL at 17:57

## 2021-03-28 RX ADMIN — ACETAMINOPHEN 1000 MG: 500 TABLET, FILM COATED ORAL at 19:18

## 2021-03-28 RX ADMIN — ATENOLOL 25 MG: 25 TABLET ORAL at 08:47

## 2021-03-28 RX ADMIN — ENOXAPARIN SODIUM 40 MG: 40 INJECTION SUBCUTANEOUS at 10:16

## 2021-03-28 RX ADMIN — NALOXEGOL OXALATE 12.5 MG: 12.5 TABLET, FILM COATED ORAL at 05:44

## 2021-03-28 RX ADMIN — ACETAMINOPHEN 1000 MG: 500 TABLET, FILM COATED ORAL at 00:19

## 2021-03-28 NOTE — PROGRESS NOTES
End of Shift Note    Bedside shift change report given to Jm (oncoming nurse) by Juan Pablo Solo (offgoing nurse). Report included the following information SBAR, Kardex, Intake/Output and Recent Results    Shift worked:  7a-7p     Shift summary and any significant changes:     Patient has been up to the chair for all meals and most of this shift. He is tolerating his current diet and his appetite is good. Passing flatus and soft BM's. No c/o pain; managed with scheduled ERAS medications. Spouse at bedside this afternoon and ambulating with patient x 3. Concerns for physician to address:       Zone phone for oncoming shift:   [phone]       Activity:  Activity Level: Up with Assistance  Number times ambulated in hallways past shift: 2  Number of times OOB to chair past shift: 3    Cardiac:   Cardiac Monitoring: No      Cardiac Rhythm: Normal sinus rhythm    Access:   Current line(s): PIV     Genitourinary:   Urinary status: voiding    Respiratory:   O2 Device: Room air  Chronic home O2 use?: NO  Incentive spirometer at bedside: YES     GI:  Last Bowel Movement Date: 03/26/21  Current diet:  DIET REGULAR Low Fiber  DIET NUTRITIONAL SUPPLEMENTS Breakfast, Lunch; Ensure Enlive  Passing flatus: YES  Tolerating current diet: YES  % Diet Eaten: 90 %    Pain Management:   Patient states pain is manageable on current regimen: YES    Skin:  Sekou Score: 22  Interventions: increase time out of bed    Patient Safety:  Fall Score:  Total Score: 2  Interventions: gripper socks       Length of Stay:  Expected LOS: - - -  Actual LOS: 2      Arlyce Board

## 2021-03-28 NOTE — PROGRESS NOTES
Primary care for this patient was provided by Franklyn Hendrickson LPN and supervised by misty Sutton RN. I agree with all assessment data charted and all nursing interventions conducted. I was present for all interventions and reassessments, appropriate nursing care provided.         Dasia Sutton RN

## 2021-03-28 NOTE — PROGRESS NOTES
CRS  Pt without complaints. Less dt today. +flatus, +stool  flowsheet reviewed  Abd: soft, nt  Inc: c/d/i    Plan:  Great progress.  Hopefully, home soon

## 2021-03-28 NOTE — PROGRESS NOTES
Problem: Falls - Risk of  Goal: *Absence of Falls  Description: Document Burrell Canavan Fall Risk and appropriate interventions in the flowsheet. Outcome: Progressing Towards Goal  Note: Fall Risk Interventions:  Mobility Interventions: Communicate number of staff needed for ambulation/transfer, Patient to call before getting OOB    Mentation Interventions: Door open when patient unattended, Bed/chair exit alarm, Increase mobility    Medication Interventions: Patient to call before getting OOB, Teach patient to arise slowly    Elimination Interventions: Call light in reach    History of Falls Interventions: Bed/chair exit alarm         Problem: Patient Education: Go to Patient Education Activity  Goal: Patient/Family Education  Outcome: Progressing Towards Goal   Patient is alert and oriented; able to voice needs. Denies any pain or discomfort at this time. No acute distress noted.

## 2021-03-29 VITALS
RESPIRATION RATE: 16 BRPM | SYSTOLIC BLOOD PRESSURE: 133 MMHG | WEIGHT: 225.75 LBS | TEMPERATURE: 97.6 F | BODY MASS INDEX: 35.43 KG/M2 | HEIGHT: 67 IN | OXYGEN SATURATION: 98 % | DIASTOLIC BLOOD PRESSURE: 74 MMHG | HEART RATE: 67 BPM

## 2021-03-29 PROCEDURE — 74011250637 HC RX REV CODE- 250/637: Performed by: COLON & RECTAL SURGERY

## 2021-03-29 RX ORDER — ACETAMINOPHEN 500 MG
1000 TABLET ORAL EVERY 6 HOURS
Qty: 40 TAB | Refills: 0 | Status: SHIPPED | OUTPATIENT
Start: 2021-03-29 | End: 2021-03-29

## 2021-03-29 RX ORDER — ACETAMINOPHEN 160 MG/5ML
650 LIQUID ORAL EVERY 6 HOURS
Qty: 406 ML | Refills: 0 | Status: SHIPPED | OUTPATIENT
Start: 2021-03-29 | End: 2021-04-03

## 2021-03-29 RX ORDER — OXYCODONE HYDROCHLORIDE 5 MG/1
5 TABLET ORAL
Qty: 10 TAB | Refills: 0 | Status: SHIPPED | OUTPATIENT
Start: 2021-03-29 | End: 2021-04-01

## 2021-03-29 RX ADMIN — ATENOLOL 25 MG: 25 TABLET ORAL at 09:21

## 2021-03-29 RX ADMIN — ACETAMINOPHEN 1000 MG: 500 TABLET, FILM COATED ORAL at 01:52

## 2021-03-29 RX ADMIN — CELECOXIB 100 MG: 100 CAPSULE ORAL at 09:21

## 2021-03-29 RX ADMIN — ACETAMINOPHEN 1000 MG: 500 TABLET, FILM COATED ORAL at 05:43

## 2021-03-29 RX ADMIN — DIGOXIN 0.12 MG: 125 TABLET ORAL at 09:21

## 2021-03-29 RX ADMIN — NALOXEGOL OXALATE 12.5 MG: 12.5 TABLET, FILM COATED ORAL at 05:43

## 2021-03-29 RX ADMIN — Medication 1 AMPULE: at 09:00

## 2021-03-29 NOTE — PROGRESS NOTES
Problem: Falls - Risk of  Goal: *Absence of Falls  Description: Document Sowmya Coffman Fall Risk and appropriate interventions in the flowsheet. Outcome: Progressing Towards Goal  Note: Fall Risk Interventions:  Mobility Interventions: Communicate number of staff needed for ambulation/transfer, Patient to call before getting OOB    Mentation Interventions: Door open when patient unattended    Medication Interventions: Patient to call before getting OOB, Teach patient to arise slowly    Elimination Interventions: Call light in reach    History of Falls Interventions: Bed/chair exit alarm         Problem: Patient Education: Go to Patient Education Activity  Goal: Patient/Family Education  Outcome: Progressing Towards Goal   Patient is alert and oriented; able to voice needs. Treated prn for any pain or discomfort. No acute distress noted.  Possible discharge home 3/29

## 2021-03-29 NOTE — DISCHARGE SUMMARY
Post- Surgical Discharge Summary    Patient ID:  Alejandro Gates  [id number]  male  66 y.o.  1945    Admit date: 3/26/2021    Discharge date: 3/29/2021    Admitting Physician: Sandy Santizo MD     Consulting Physician(s):   Treatment Team: Attending Provider: Sam Wright MD; Utilization Review: Kelton Sanchez RN; Staff Nurse: Pablito Verma LPN; Primary Nurse: Malathi Escobedo    Date of Surgery:   3/26/2021     Preoperative Diagnosis:  COLON CANCER    Postoperative Diagnosis:   COLON CANCER    Procedure(s):  ROBOTIC SIGMOID COLECTOMY (E R A S)     Anesthesia Type:   General     Surgeon: Sam Wright MD                            HPI:  Pt is a 66 y.o. male who has a history of COLON CANCER who presents at this time for a sigmoid colectomy following the failure of conservative management. Problem List:   Problem List as of 3/29/2021 Date Reviewed: 3/26/2021          Codes Class Noted - Resolved    Colon cancer ([facility] [address] ICD-10-CM: C18.9  ICD-9-CM: 153.9  3/26/2021 - Present        Paroxysmal A-fib ([facility] [address] ICD-10-CM: I48.0  ICD-9-CM: 427.31  2/25/2016 - Present        Afib ([facility] [address] ICD-10-CM: I48.91  ICD-9-CM: 427.31  1/24/2013 - Present               Hospital Course: The patient underwent surgery. Intra-operative complications: None; patient tolerated the procedure well. Was taken to the PACU in stable condition and then transferred to the surgical floor. Perioperative Antibiotics: Cefoxitin    Postoperative Pain Management:  Oxycodone    Postoperative transfusions:    Number of units banked PRBCs =   none     Post Op complications: None =    Incisions  - clean, dry and intact. No significant erythema or swelling. Wound(s) appear to be healing without any evidence of infection. Patient mobilized with nursing and was found to be safe and steady with ambulation.      Discharged to: Home     Condition on Discharge: Stable     Discharge instructions:    - Take pain medications as prescribed  - Diet Low Fiber  - Discharge activity:    - Activity as tolerated    - Ambulate several times a day   - No heavy lifting for 4 weeks   - Do not drive for two weeks or while on opioid pain medications  - Wound Care: Keep wound(s) clean and dry. See discharge instruction sheet. Allergies:  No Known Allergies           -DISCHARGE MEDICATION LIST     Current Discharge Medication List      START taking these medications    Details   acetaminophen (TYLENOL) 500 mg tablet Take 2 Tabs by mouth every six (6) hours for 5 days. Qty: 40 Tab, Refills: 0      oxyCODONE IR (ROXICODONE) 5 mg immediate release tablet Take 1 Tab by mouth every four (4) hours as needed for Pain for up to 3 days. Max Daily Amount: 30 mg.  Qty: 10 Tab, Refills: 0    Associated Diagnoses: Malignant neoplasm of sigmoid colon ([facility] [address]         CONTINUE these medications which have NOT CHANGED    Details   digoxin (LANOXIN) 0.125 mg tablet TAKE 1 TABLET EVERY MONDAY, WEDNESDAY AND FRIDAY  Qty: 36 Tab, Refills: 3      atenoloL (TENORMIN) 25 mg tablet TAKE 1 TABLET DAILY  Qty: 90 Tab, Refills: 3      apixaban (Eliquis) 5 mg tablet TAKE 1 TABLET TWICE A DAY  Qty: 180 Tab, Refills: 3      coenzyme q10-vitamin e (COQ10 ) 100-100 mg-unit cap Take 1 Tab by mouth daily. per medical continuation form      -Follow up in office in 2 weeks      Signed:  Judson Lui.  Josephine Hyman  MSN, APRN, FNP-C, [facility]  Surgical Nurse Practitioner    3/29/2021  8:18 AM

## 2021-03-29 NOTE — PROGRESS NOTES
Discharge instructions reviewed and signed with pt and his wife. Peripheral IV's removed from pt. Pt belongings with pt. Pt understood instructions and provided an opportunity to ask questions. Pt taken to main entrance via wheelchair for discharge.

## 2021-03-29 NOTE — DISCHARGE INSTRUCTIONS
Rc Goodman MD, [address] Samra Dillon Hanna MD, [address] [facility] Malathi Vanessa MD, [facility]  Su Harris. Kenia Nolen MD, MD Jessica Peterson MD Carma Nipple, MD      Discharge Instructions for [facility] Colorectal Surgery Patients       1. Do not lift any objects weighing more than 10 pounds for 4 weeks. 2. Do not do any housework including vacuuming, scrubbing or yardwork for 4 weeks. 3. Do not drive for two weeks or while taking sedating medications. 4. You may walk as desired and go up and down stairs as needed. 5. You may shower. Do not take tub baths, swim or use hot tubs for 4 weeks. 6. Leave steri-strips on incision. They will fall off on their own. You may have dermabond on your incisions which is surgical glue. This will dissolve over time. Do not scrub around incisions. 7. Follow low-fiber diet for 2 weeks. (See handbook for additional details). 8. Drink nutritional supplements 2 times per day until your diet and appetite are back to normal. Diabetic patients should drink one-half bottle 4 times daily. 9. Multiple bowel movements are normal each day. Contact your surgeons office for any concerns. 10. Take Acetaminophen 1000mg every 6 hours for 24 hours, then as needed for pain, Ibuprofen 200-400 mg every 8 hours as needed for pain  and Oxycodone (per prescription instructions)    11. Follow up with providers as scheduled. 12. If your surgery involves an ostomy bag, please bring your supplies to the first office visit with your surgeon. 13. If you experience fever (greater than 100.5), chills, vomiting or redness or drainage at surgical site, please contact your surgeons office. 14. For questions regarding quality or quantity of ostomy output, refer to ERAS handbook or call surgeons office. Please see handbook for additional instructions. If you have further questions, please call your surgeons office.

## 2021-03-30 ENCOUNTER — PATIENT OUTREACH (OUTPATIENT)
Dept: CASE MANAGEMENT | Age: 76
End: 2021-03-30

## 2021-03-30 NOTE — PROGRESS NOTES
Care Transitions Initial Follow Up Call    Call within 2 business days of discharge: Yes     Patient: Omi Cai Patient : 1945 MRN: [id number]    Last Discharge [facility]       Complaint Diagnosis Description Type Department Provider    3/26/21  Malignant neoplasm of sigmoid colon [facility]) Admission (Discharged) Araceli Nicole MD          Was this an external facility discharge? no    Challenges to be reviewed by the provider   Additional needs identified to be addressed with provider yes  advance care planning- No ACP on chart   [facility] 3/26-3/29 Colon cancer s/p sigmoid colectomy. Declined DEMETRIA with pcp. Given info on [facility] as resource         Method of communication with provider : chart routing    Discussed COVID-19 related testing which was not done at this time. Test results were not done. Advance Care Planning:   Does patient have an Advance Directive: not on file; education provided     Inpatient Readmission Risk score: Unplanned Readmit Risk Score: 7    Was this a readmission? no   Patient stated reason for the admission: colon cancer- scheduled for colectomy    Patients top risk factors for readmission: medical condition no known risk factors  Interventions to address risk factors: Scheduled appointment with PCP-patient declined DEMETRIA with pcp. Given info on [facility] as resource., Scheduled appointment with Specialist-  and Obtained and reviewed discharge summary and/or continuity of care documents    Care Transition Nurse (CTN) contacted the patient by telephone to perform post hospital discharge assessment. Verified name and  with patient as identifiers. Provided introduction to self, and explanation of the CTN role. In great spirits, laughing. Says he feels good. Has not taken any of the Oxycodone. Took Tylenol last night at 8pm, none since. Getting ready to take short walk with wife outside, plans to take Tylenol before goes out. Does have some discomfort when stands or walks but not unbearable. CTN reviewed discharge instructions, medical action plan and red flags with patient who verbalized understanding. Were discharge instructions available to patient? yes. Reviewed appropriate site of care based on symptoms and resources available to patient including: PCP, Specialist, Urgent Care Clinics and When to call 911. Patient given an opportunity to ask questions and does not have any further questions or concerns at this time. The patient agrees to contact the PCP office for questions related to their healthcare. Medication reconciliation was performed with patient, who verbalizes understanding of administration of home medications. Advised obtaining a 90-day supply of all daily and as-needed medications. Referral to Pharm D needed: no     Home Health/Outpatient orders at discharge: [address]: na  Date of initial visit: na    Durable Medical Equipment ordered at discharge: none  [address] [facility] [address] received: na    Covid Risk Education    Patient has following risk factors of: no known risk factors. Education provided regarding infection prevention, and signs and symptoms of COVID-19 and when to seek medical attention with patient who verbalized understanding. Discussed exposure protocols and quarantine From CDC: Are you at higher risk for severe illness?  and given an opportunity for questions and concerns. The patient agrees to contact the COVID-19 hotline 261-663-4000 or PCP office for questions related to COVID-19. For more information on steps you can take to protect yourself, see CDC's How to Protect Yourself     Was patient discharged with a pulse oximeter? no Discussed and confirmed pulse oximeter discharge instructions and when to notify provider or seek emergency care.     Patient/family/caregiver given information for [employer] and agrees to enroll no  Patient's preferred e-mail: declines  Patient's preferred phone number: declines    Discussed follow-up appointments. If no appointment was previously scheduled, appointment scheduling offered: yes Is follow up appointment scheduled within 7 days of discharge? no -Patient declined DEMETRIA with pcp at this time. Prefers to wait and see surgeon on 4/13. Given info on [employer] as resource. [facility] follow up appointment(s):   Future Appointments   Date Time Provider Zoe Cyr   4/14/2021  1:00 PM MD MARY Brandt [facility]     [facility] follow up appointment(s): Jessica Lopez- 4/13    Plan for follow-up call in 7-10 days based on severity of symptoms and risk factors. Plan for next call: symptom management-assess current symptoms, self management-following discharge instructions, follow up appointment-saw surgeon for 2 week f/u visit and medication management-taking meds as ordered. Assess whether needing to take any pain meds at this time. CTN provided contact information for future needs. Goals Addressed                 This Visit's Progress     Prevent complications post hospitalization. 3/30/21 [facility] 3/26-3/29 Colon cancer s/p sigmoid colectomy   Reviewed discharge instructions with patient   Reviewed meds- education provided on new medications.  Reviewed red flags: fever,nausea,vomiting,diarrhea,signs of infection at incision site, drainage-foul smelling-swelling.  Declined DEMETRIA with pcp, given info on [facility] as resource.  - appt 4/13.  Given CTN contact info.    Advised CTN to check back in about a week, sooner [email or web address]

## 2021-04-08 ENCOUNTER — TRANSCRIBE ORDER (OUTPATIENT)
Dept: SCHEDULING | Age: 76
End: 2021-04-08

## 2021-04-08 DIAGNOSIS — C18.7 CARCINOMA OF SIGMOID COLON (HCC): Primary | ICD-10-CM

## 2021-04-13 ENCOUNTER — PATIENT OUTREACH (OUTPATIENT)
Dept: CASE MANAGEMENT | Age: 76
End: 2021-04-13

## 2021-04-13 NOTE — PROGRESS NOTES
Called and spoke to patient. Goals      Prevent complications post hospitalization. 3/30/21 [address] 3/26-3/29 Colon cancer s/p sigmoid colectomy   Reviewed discharge instructions with patient   Reviewed meds- education provided on new medications.  Reviewed red flags: fever,nausea,vomiting,diarrhea,signs of infection at incision site, drainage-foul smelling-swelling.  Declined DEMETRIA with pcp, given info on [facility] as resource.  - appt 4/13.  Given CTN contact info.  Advised CTN to check back in about a week, sooner prn.mbt  4/13/21  Called and spoke to patient. In good spirits. Had just seen  this am, was on his way home. Said he was healing well and can eat regular diet now. No discomfort, occ twinge when rolls over in bed- to be expected Dara Chew told him. No red flags noted. Reminded to call if concerns. CTN to check back in about a week. mbt

## 2021-04-14 ENCOUNTER — OFFICE VISIT (OUTPATIENT)
Dept: CARDIOLOGY CLINIC | Age: 76
End: 2021-04-14
Payer: MEDICARE

## 2021-04-14 VITALS
DIASTOLIC BLOOD PRESSURE: 60 MMHG | WEIGHT: 227 LBS | HEIGHT: 68 IN | OXYGEN SATURATION: 97 % | RESPIRATION RATE: 18 BRPM | SYSTOLIC BLOOD PRESSURE: 120 MMHG | BODY MASS INDEX: 34.4 KG/M2 | HEART RATE: 59 BPM

## 2021-04-14 DIAGNOSIS — Z79.01 CHRONIC ANTICOAGULATION: ICD-10-CM

## 2021-04-14 DIAGNOSIS — I10 BENIGN ESSENTIAL HTN: ICD-10-CM

## 2021-04-14 DIAGNOSIS — I48.0 PAROXYSMAL A-FIB (HCC): Primary | ICD-10-CM

## 2021-04-14 DIAGNOSIS — C18.9 MALIGNANT NEOPLASM OF COLON, UNSPECIFIED PART OF COLON (HCC): ICD-10-CM

## 2021-04-14 PROCEDURE — 1111F DSCHRG MED/CURRENT MED MERGE: CPT | Performed by: INTERNAL MEDICINE

## 2021-04-14 PROCEDURE — G0463 HOSPITAL OUTPT CLINIC VISIT: HCPCS | Performed by: INTERNAL MEDICINE

## 2021-04-14 PROCEDURE — G8752 SYS BP LESS 140: HCPCS | Performed by: INTERNAL MEDICINE

## 2021-04-14 PROCEDURE — 99214 OFFICE O/P EST MOD 30 MIN: CPT | Performed by: INTERNAL MEDICINE

## 2021-04-14 PROCEDURE — G8427 DOCREV CUR MEDS BY ELIG CLIN: HCPCS | Performed by: INTERNAL MEDICINE

## 2021-04-14 PROCEDURE — G8536 NO DOC ELDER MAL SCRN: HCPCS | Performed by: INTERNAL MEDICINE

## 2021-04-14 PROCEDURE — 1101F PT FALLS ASSESS-DOCD LE1/YR: CPT | Performed by: INTERNAL MEDICINE

## 2021-04-14 PROCEDURE — G8510 SCR DEP NEG, NO PLAN REQD: HCPCS | Performed by: INTERNAL MEDICINE

## 2021-04-14 PROCEDURE — G8754 DIAS BP LESS 90: HCPCS | Performed by: INTERNAL MEDICINE

## 2021-04-14 PROCEDURE — G8417 CALC BMI ABV UP PARAM F/U: HCPCS | Performed by: INTERNAL MEDICINE

## 2021-04-14 RX ORDER — DIGOXIN 125 MCG
TABLET ORAL
Qty: 36 TAB | Refills: 3 | Status: SHIPPED | OUTPATIENT
Start: 2021-04-14 | End: 2022-02-18

## 2021-04-14 RX ORDER — ATENOLOL 25 MG/1
TABLET ORAL
Qty: 90 TAB | Refills: 3 | Status: SHIPPED | OUTPATIENT
Start: 2021-04-14 | End: 2021-07-29

## 2021-04-14 NOTE — LETTER
Patient:  Damon Malik YOB: 1945 Date of Visit: 4/14/2021 Dear Roopa Cantu MD 
[address] [facility] [address] [place] [address] [address] Fax: [phone] Stephanie Oneal MD 
[address] [address] Via Fax: [phone]: Mr. Claudie Gitelman is a 67 yo M seen initially for afib with RVR noted on 1/21/13 ER visit, h/o CT followed by Dr. Abhishek Saucedo for question of aortic dissection and pt was started on atenolol; CT chest was recommended but pt did not want \"extra radiation\".   
  
He was diagnosed with colon cancer and is status post recent sigmoid colectomy at the end of March. He is followed by oncology, Dr. Aditi Mascorro. He was told there was 1 out of 20 lymph nodes that was positive and he is starting four treatments of chemotherapy. He had trouble sleeping post surgery, but he has been doing better at home. He denies any exertional chest pain. He has some baseline shortness of breath, but this is unchanged. Rare palpitations. No bleeding issues on Eliquis. He is compensated on exam with clear lungs and no lower extremity edema. Soc Hx. No tobacco.  . 1 child. [facility] . Fam Hx. Father side uncles CAD 52's Assessment and Plan: 1. Paroxysmal atrial fibrillation. Stable on Atenolol and Digoxin for rate control. Will have him follow back in six months. If he continues stable, may have him follow back in a year. 2. Chronic anticoagulation. No bleeding issues on Eliquis. 3. Essential hypertension. Blood pressure is controlled and no changes made. 4. Colon cancer, status post colectomy. Followed by Dr. Aditi Mascorro and he has plans for chemotherapy post sigmoid colectomy. If you have questions, please do not hesitate to call me. Sincerely, Caridad Hughes MD

## 2021-04-14 NOTE — PROGRESS NOTES
[facility] [address]: Michelle Galindo  [phone]    HPI:   Mr. Robel Raya is a 67 yo M seen initially for afib with RVR noted on 1/21/13 ER visit, h/o CT followed by Dr. Berhane Ayoub for question of aortic dissection and pt was started on atenolol; CT chest was recommended but pt did not want \"extra radiation\". He was diagnosed with colon cancer and is status post recent sigmoid colectomy at the end of March. He is followed by oncology, Dr. Milvia Steele. He was told there was 1 out of 20 lymph nodes that was positive and he is starting four treatments of chemotherapy. He had trouble sleeping post surgery, but he has been doing better at home. He denies any exertional chest pain. He has some baseline shortness of breath, but this is unchanged. Rare palpitations. No bleeding issues on Eliquis. He is compensated on exam with clear lungs and no lower extremity edema. Soc Hx. No tobacco.  . 1 child. [facility] . Fam Hx. Father side uncles CAD 52's  Assessment and Plan:   1. Paroxysmal atrial fibrillation. Stable on Atenolol and Digoxin for rate control. Will have him follow back in six months. If he continues stable, may have him follow back in a year. 2. Chronic anticoagulation. No bleeding issues on Eliquis. 3. Essential hypertension. Blood pressure is controlled and no changes made. 4. Colon cancer, status post colectomy. Followed by Dr. Milvia Steele and he has plans for chemotherapy post sigmoid colectomy. He  has a past medical history of Essential hypertension. He also has no past medical history of Diabetes or Hyperlipidemia. All other systems negative except as above. PE  Filed Vitals:    01/30/14 0947 01/30/14 0951   BP: 140/80 136/78   Pulse: 56    Resp: 18    Height: 5' 11\" (1.803 m)    Weight: 234 lb 9.6 oz (106.414 kg)     Body mass index is 32.73 kg/(m^2).    General appearance - alert, well appearing, and in no distress  Mental status - affect appropriate to mood  Eyes - sclera anicteric, moist mucous membranes  Neck - supple, no JVD  Chest - clear to auscultation, no wheezes, rales or rhonchi  Heart - regular rhythm colton, normal S1, S2, I/VI systolic murmur LUSB  Abdomen - soft, nontender, nondistended  Extremities - peripheral pulses normal, no pedal edema  Recent Labs:  No results found for this basename: CHOL,  [id number],  CHOLX,  CHOLP,  CHLST,  CHOLV,  [id number],  HDL,  [id number],  HDLC,  HDLP,  LDL,  DLDL,  LDLC,  DLDLP,  TGL,  Chip Lisa,  [id number],  TRIGP,  CHHD,  [facility]     Lab Results   Component Value Date/Time    Creatinine 0.51 1/21/2013  3:40 AM     Lab Results   Component Value Date/Time    BUN 16 1/21/2013  3:40 AM     Lab Results   Component Value Date/Time    Potassium 3.7 1/21/2013  3:40 AM     No results found for this basename: HBA1C,  HGBE8     Lab Results   Component Value Date/Time    HGB 13.8 1/21/2013  3:40 AM     Lab Results   Component Value Date/Time    PLATELET 698 0/95/0524  3:40 AM       Reviewed:  Past Medical History   Diagnosis Date    Essential hypertension      History   Smoking status    Never Smoker    Smokeless tobacco    Never Used     History   Alcohol Use    Yes     Comment: Occasional     No Known Allergies    Current Outpatient Prescriptions   Medication Sig    OTHER Heart Herbal Formula ([place] berry, garlic, eulalia, cactus stem and flower, red clover, motherwort herb, habanero pepper) Couple of drops 2-3 days a week    atenolol (TENORMIN) 25 mg tablet Take 1 Tab by mouth daily.  digoxin (LANOXIN) 0.125 mg tablet Take 1 Tab by mouth every Monday, Wednesday, Friday.  folic acid 069 mcg tablet Take 400 mcg by mouth daily.  DOCOSAHEXANOIC ACID/EPA (FISH OIL PO) Take  by mouth. 1 tblspn daily     coenzyme q10-vitamin e (COQ10 ) 100-100 mg-unit cap Take 1 Tab by mouth daily.  aspirin (ASPIRIN) 325 mg tablet Take 1 Tab by mouth daily. No current facility-administered medications for this visit. William Baltazar MD  [facility] heart and Vascular [place]  [address], [address], [address]

## 2021-04-22 ENCOUNTER — PATIENT OUTREACH (OUTPATIENT)
Dept: CASE MANAGEMENT | Age: 76
End: 2021-04-22

## 2021-04-22 NOTE — PROGRESS NOTES
Called and spoke to patient. Goals      Prevent complications post hospitalization. 3/30/21 [address] 3/26-3/29 Colon cancer s/p sigmoid colectomy   Reviewed discharge instructions with patient   Reviewed meds- education provided on new medications.  Reviewed red flags: fever,nausea,vomiting,diarrhea,signs of infection at incision site, drainage-foul smelling-swelling.  Declined DEMETRIA with pcp, given info on [facility] as resource.  - appt 4/13.  Given CTN contact info.  Advised CTN to check back in about a week, sooner prn.mbt  4/13/21  Called and spoke to patient. In good spirits. Had just seen  this am, was on his way home. Said he was healing well and can eat regular diet now. No discomfort, occ twinge when rolls over in bed- to be expected Racheal Noe told him. No red flags noted. Reminded to call if concerns. CTN to check back in about a week. mbt  4/22/21  Patient reports he is doing well. Went to [facility] today- starts chemo on Monday. Chris Solomon is his oncologist.  Support given. Advised will check with him next week to see how it goes. Call if concerns/red flags. mbt

## 2021-04-30 ENCOUNTER — PATIENT OUTREACH (OUTPATIENT)
Dept: CASE MANAGEMENT | Age: 76
End: 2021-04-30

## 2021-04-30 NOTE — PROGRESS NOTES
Patient has graduated from the Transitions of Care Coordination  program on 4/30/21. Patient/family has the ability to self-manage at this time Care management goals have been completed. Patient was not referred to the [facility] team for further management. Goals Addressed                 This Visit's Progress     COMPLETED: Prevent complications post hospitalization. 3/30/21 [address] 3/26-3/29 Colon cancer s/p sigmoid colectomy   Reviewed discharge instructions with patient   Reviewed meds- education provided on new medications.  Reviewed red flags: fever,nausea,vomiting,diarrhea,signs of infection at incision site, drainage-foul smelling-swelling.  Declined DEMETRIA with pcp, given info on [facility] as resource.  - appt 4/13.  Given CTN contact info.  Advised CTN to check back in about a week, sooner prn.mbt  4/13/21  Called and spoke to patient. In good spirits. Had just seen  this am, was on his way home. Said he was healing well and can eat regular diet now. No discomfort, occ twinge when rolls over in bed- to be expected James  told him. No red flags noted. Reminded to call if concerns. CTN to check back in about a week. mbt  4/22/21  Patient reports he is doing well. Went to [facility] today- starts chemo on Monday. Cynthia Butler is his oncologist.  Support given. Advised will check with him next week to see how it goes. Call if concerns/red flags. mbt  4/30/21  Has started the chemo. Having side effects, food now has bad taste and smells of food make him nauseated. Had trouble swallowing the chemo pills, so bitter. Struggling to maintain his nutrition and eat healthy. Advised to not worry so much about carbs right now, while trying to stay nourished. Urged to take med for nausea prn, he was only given a few tabs. Advised can always ask for refill. Given support. Declined offer for referral to CM.  Says the oncologist has a lot of resources in their office if needs anything. Wished him well, call if needs anything. mbt            Patient has Care Transition Nurse's contact information for any further questions, concerns, or needs.   Patients upcoming visits:    Future Appointments   Date Time Provider Zoe Cyr   10/6/2021  1:20 PM MD MARY Skaggs AMB

## 2021-06-14 ENCOUNTER — TELEPHONE (OUTPATIENT)
Dept: CARDIOLOGY CLINIC | Age: 76
End: 2021-06-14

## 2021-06-14 NOTE — TELEPHONE ENCOUNTER
MD Patel Malik, RN  Caller: Unspecified (Today,  8:42 AM)  Yes can stay off another 48 hrs then resume. They should make sure his blood count and platelets are ok.  thx

## 2021-06-14 NOTE — TELEPHONE ENCOUNTER
Patient complained of having nose bleeds over the weekend, he spoke with the on call provider who stated he should discontinue the Eliquis for 48 hours. Patient has chemotherapy tomorrow, inquiring whether he should stay off for another 48 hours.         PHONE [phone]

## 2021-07-29 ENCOUNTER — TELEPHONE (OUTPATIENT)
Dept: CARDIOLOGY CLINIC | Age: 76
End: 2021-07-29

## 2021-07-29 RX ORDER — ATENOLOL 50 MG/1
TABLET ORAL
Qty: 90 TABLET | Refills: 1
Start: 2021-07-29 | End: 2021-08-25

## 2021-07-29 NOTE — TELEPHONE ENCOUNTER
Patient calling to speak to the nurse as he is in afib now, he states he has been in and out of afib but have been in afib for a couple weeks, the cancer center suggested he called as his BP was elevated on 7/29/21, patient said the bottom number was 100 but he unsure of the BP reading, please advise        [phone]

## 2021-07-29 NOTE — TELEPHONE ENCOUNTER
MD Alfred Wilkins, RN  Caller: Unspecified (Today, 12:19 PM)  Double beta blocker. Call with BP in 1 week.  thx

## 2021-07-29 NOTE — TELEPHONE ENCOUNTER
Returned call to patient. Two patient indentifiers verified. Pt was informed of message. Pt verbalized understanding and denies any further question at this time.      Future Appointments   Date Time Provider Zoe Cyr   10/6/2021  1:20 PM MD MARY Claros AMB

## 2021-08-17 ENCOUNTER — OFFICE VISIT (OUTPATIENT)
Dept: CARDIOLOGY CLINIC | Age: 76
End: 2021-08-17
Payer: MEDICARE

## 2021-08-17 ENCOUNTER — CLINICAL SUPPORT (OUTPATIENT)
Dept: CARDIOLOGY CLINIC | Age: 76
End: 2021-08-17
Payer: MEDICARE

## 2021-08-17 VITALS
DIASTOLIC BLOOD PRESSURE: 80 MMHG | HEIGHT: 68 IN | SYSTOLIC BLOOD PRESSURE: 120 MMHG | BODY MASS INDEX: 34.4 KG/M2 | HEART RATE: 79 BPM | OXYGEN SATURATION: 99 % | WEIGHT: 227 LBS | RESPIRATION RATE: 16 BRPM

## 2021-08-17 DIAGNOSIS — Z79.01 CHRONIC ANTICOAGULATION: ICD-10-CM

## 2021-08-17 DIAGNOSIS — I48.20 CHRONIC A-FIB (HCC): Primary | ICD-10-CM

## 2021-08-17 DIAGNOSIS — I10 BENIGN ESSENTIAL HTN: ICD-10-CM

## 2021-08-17 DIAGNOSIS — C18.9 MALIGNANT NEOPLASM OF COLON, UNSPECIFIED PART OF COLON (HCC): ICD-10-CM

## 2021-08-17 PROCEDURE — G8536 NO DOC ELDER MAL SCRN: HCPCS | Performed by: INTERNAL MEDICINE

## 2021-08-17 PROCEDURE — G8754 DIAS BP LESS 90: HCPCS | Performed by: INTERNAL MEDICINE

## 2021-08-17 PROCEDURE — G8427 DOCREV CUR MEDS BY ELIG CLIN: HCPCS | Performed by: INTERNAL MEDICINE

## 2021-08-17 PROCEDURE — G8417 CALC BMI ABV UP PARAM F/U: HCPCS | Performed by: INTERNAL MEDICINE

## 2021-08-17 PROCEDURE — 93225 XTRNL ECG REC<48 HRS REC: CPT | Performed by: INTERNAL MEDICINE

## 2021-08-17 PROCEDURE — 99214 OFFICE O/P EST MOD 30 MIN: CPT | Performed by: INTERNAL MEDICINE

## 2021-08-17 PROCEDURE — 93227 XTRNL ECG REC<48 HR R&I: CPT | Performed by: INTERNAL MEDICINE

## 2021-08-17 PROCEDURE — G0463 HOSPITAL OUTPT CLINIC VISIT: HCPCS | Performed by: INTERNAL MEDICINE

## 2021-08-17 PROCEDURE — G8510 SCR DEP NEG, NO PLAN REQD: HCPCS | Performed by: INTERNAL MEDICINE

## 2021-08-17 PROCEDURE — G8752 SYS BP LESS 140: HCPCS | Performed by: INTERNAL MEDICINE

## 2021-08-17 PROCEDURE — 1101F PT FALLS ASSESS-DOCD LE1/YR: CPT | Performed by: INTERNAL MEDICINE

## 2021-08-17 RX ORDER — DEXAMETHASONE 4 MG/1
TABLET ORAL
COMMUNITY
Start: 2021-07-26 | End: 2021-11-04

## 2021-08-17 NOTE — LETTER
Patient:  Snehal Ahmadi   YOB: 1945  Date of Visit: 8/17/2021    Radha Islas MD  [address] [facility] [address]  Via Fax: [phone]:    Dear Baljeet Zimmer,    Mr. Eli Boswell is a 65 yo M seen initially for afib with RVR noted on 1/21/13 ER visit, h/o CT followed by Dr. Albaro Loera for question of aortic dissection and pt was started on atenolol; CT chest was recommended but pt did not want \"extra radiation\". He was diagnosed with colon cancer and is status post recent sigmoid colectomy at the end of March. He is followed by oncology, Dr. Meredith Barahona. On chemo for 1 out of 20 lymph nodes that was positive. He was having elevated blood pressure and increased palpitations a few weeks ago and had him double his Atenolol. This did help his blood pressure, but he is still feeling a lot of palpitation. He is getting chemotherapy right now as well. He feels more tired and if he stands quickly he gets dizziness. On exam, he is compensated with clear lungs. He is in atrial fibrillation. He does have 1+ bilateral lower extremity edema. We talked about possibly using diuretic, but they want to avoid that if possible and I do think if he elevates his legs and uses compression stockings, he will not need diuretic. Soc Hx. No tobacco.  . 1 child. [facility] . Fam Hx. Father side uncles CAD 52's  Assessment and Plan:   1. Paroxysmal atrial fibrillation. Overall his heart rate has been labile and he feels frequent palpitations despite Atenolol and Digoxin. Will have him wear a 24 hour Holter. Once I have these results, would review them with our EP, Dr. Checo Egan. I did talk to him about the possibility of AV node ablation and pacemaker if his heart rate is very poorly controlled and he cannot tolerate medication well. 2. Chronic anticoagulation. No bleeding issues on Eliquis. 3. Essential hypertension. Blood pressure is controlled.     4. Colon cancer, status post colectomy. Followed by oncology and he is getting chemotherapy post sigmoid colectomy. If you have questions, please do not hesitate to call me.       Sincerely,      Zander Edge MD

## 2021-08-17 NOTE — PROGRESS NOTES
[facility] [address]: Trinity Luz  [phone]    HPI:   Mr. Mayuri Wilhelm is a 67 yo M seen initially for afib with RVR noted on 1/21/13 ER visit, h/o CT followed by Dr. Kristal Vieira for question of aortic dissection and pt was started on atenolol; CT chest was recommended but pt did not want \"extra radiation\". He was diagnosed with colon cancer and is status post recent sigmoid colectomy at the end of March. He is followed by oncology, Dr. Winifred Hassan. On chemo for 1 out of 20 lymph nodes that was positive. He was having elevated blood pressure and increased palpitations a few weeks ago and had him double his Atenolol. This did help his blood pressure, but he is still feeling a lot of palpitation. He is getting chemotherapy right now as well. He feels more tired and if he stands quickly he gets dizziness. On exam, he is compensated with clear lungs. He is in atrial fibrillation. He does have 1+ bilateral lower extremity edema. We talked about possibly using diuretic, but they want to avoid that if possible and I do think if he elevates his legs and uses compression stockings, he will not need diuretic. Soc Hx. No tobacco.  . 1 child. [facility] . Fam Hx. Father side uncles CAD 52's  Assessment and Plan:   1. Paroxysmal atrial fibrillation. Overall his heart rate has been labile and he feels frequent palpitations despite Atenolol and Digoxin. Will have him wear a 24 hour Holter. Once I have these results, would review them with our EP, Dr. Van Palacios. I did talk to him about the possibility of AV node ablation and pacemaker if his heart rate is very poorly controlled and he cannot tolerate medication well. 2. Chronic anticoagulation. No bleeding issues on Eliquis. 3. Essential hypertension. Blood pressure is controlled. 4. Colon cancer, status post colectomy. Followed by oncology and he is getting chemotherapy post sigmoid colectomy.         He  has a past medical history of Essential hypertension. He also has no past medical history of Diabetes or Hyperlipidemia. All other systems negative except as above. PE  Filed Vitals:    01/30/14 0947 01/30/14 0951   BP: 140/80 136/78   Pulse: 56    Resp: 18    Height: 5' 11\" (1.803 m)    Weight: 234 lb 9.6 oz (106.414 kg)     Body mass index is 32.73 kg/(m^2).    General appearance - alert, well appearing, and in no distress  Mental status - affect appropriate to mood  Eyes - sclera anicteric, moist mucous membranes  Neck - supple, no JVD  Chest - clear to auscultation, no wheezes, rales or rhonchi  Heart - irregularly irregular, normal S1, S2, I/VI systolic murmur LUSB  Abdomen - soft, nontender, nondistended  Extremities - peripheral pulses normal, trace pedal edema  Recent Labs:  No results found for this basename: CHOL,  [id number],  CHOLX,  CHOLP,  CHLST,  CHOLV,  [id number],  HDL,  [id number],  HDLC,  HDLP,  LDL,  DLDL,  LDLC,  DLDLP,  TGL,  Fito Fines,  [id number],  TRIGP,  CHHD,  [facility]     Lab Results   Component Value Date/Time    Creatinine 0.51 1/21/2013  3:40 AM     Lab Results   Component Value Date/Time    BUN 16 1/21/2013  3:40 AM     Lab Results   Component Value Date/Time    Potassium 3.7 1/21/2013  3:40 AM     No results found for this basename: HBA1C,  HGBE8     Lab Results   Component Value Date/Time    HGB 13.8 1/21/2013  3:40 AM     Lab Results   Component Value Date/Time    PLATELET 855 2/45/4015  3:40 AM       Reviewed:  Past Medical History   Diagnosis Date    Essential hypertension      History   Smoking status    Never Smoker    Smokeless tobacco    Never Used     History   Alcohol Use    Yes     Comment: Occasional     No Known Allergies    Current Outpatient Prescriptions   Medication Sig    OTHER Heart Herbal Formula ([place] berry, garlic, eulalia, cactus stem and flower, red clover, motherwort herb, habanero pepper) Couple of drops 2-3 days a week    atenolol (TENORMIN) 25 mg tablet Take 1 Tab by mouth daily.    digoxin (LANOXIN) 0.125 mg tablet Take 1 Tab by mouth every Monday, Wednesday, Friday.  folic acid 393 mcg tablet Take 400 mcg by mouth daily.  DOCOSAHEXANOIC ACID/EPA (FISH OIL PO) Take  by mouth. 1 tblspn daily     coenzyme q10-vitamin e (COQ10 ) 100-100 mg-unit cap Take 1 Tab by mouth daily.  aspirin (ASPIRIN) 325 mg tablet Take 1 Tab by mouth daily. No current facility-administered medications for this visit.        Lashae Cameron MD  [facility] heart and Vascular [place]  [address], [address], [address] [place] [address] Avenue

## 2021-08-24 ENCOUNTER — DOCUMENTATION ONLY (OUTPATIENT)
Dept: CARDIOLOGY CLINIC | Age: 76
End: 2021-08-24

## 2021-08-24 NOTE — PROGRESS NOTES
Holter monitor August 2021 with atrial fibrillation and rapid ventricular rate average 138 bpm, maximum 160 bpm and minimum 127 bpm.  208 PVCs    Recommend atenolol 50 mg bid if bp allows

## 2021-08-25 ENCOUNTER — TELEPHONE (OUTPATIENT)
Dept: CARDIOLOGY CLINIC | Age: 76
End: 2021-08-25

## 2021-08-25 RX ORDER — ATENOLOL 50 MG/1
50 TABLET ORAL 2 TIMES DAILY
Qty: 180 TABLET | Refills: 1 | Status: SHIPPED | OUTPATIENT
Start: 2021-08-25 | End: 2021-11-04

## 2021-08-25 NOTE — TELEPHONE ENCOUNTER
Julianne Stoner MD Yesterday (7:35 AM)   MN     Holter monitor August 2021 with atrial fibrillation and rapid ventricular rate average 138 bpm, maximum 160 bpm and minimum 127 bpm.  208 PVCs     Recommend atenolol 50 mg bid if bp allows        Deanna Galo MD  You; Julianne Stoner MD 23 hours ago (10:43 AM)   SW  Agree. Can see my on Dr. Caleb Mckinley in follow up in a few weeks.  thx

## 2021-08-25 NOTE — TELEPHONE ENCOUNTER
Patient's spouse is calling requesting results from the holter monitor              VKCJW:[phone]

## 2021-09-07 ENCOUNTER — OFFICE VISIT (OUTPATIENT)
Dept: CARDIOLOGY CLINIC | Age: 76
End: 2021-09-07
Payer: MEDICARE

## 2021-09-07 VITALS
WEIGHT: 221.6 LBS | HEART RATE: 132 BPM | HEIGHT: 68 IN | BODY MASS INDEX: 33.59 KG/M2 | OXYGEN SATURATION: 97 % | DIASTOLIC BLOOD PRESSURE: 80 MMHG | RESPIRATION RATE: 14 BRPM | SYSTOLIC BLOOD PRESSURE: 130 MMHG

## 2021-09-07 DIAGNOSIS — C18.9 MALIGNANT NEOPLASM OF COLON, UNSPECIFIED PART OF COLON (HCC): ICD-10-CM

## 2021-09-07 DIAGNOSIS — Z79.01 CHRONIC ANTICOAGULATION: ICD-10-CM

## 2021-09-07 DIAGNOSIS — I48.0 PAROXYSMAL A-FIB (HCC): Primary | ICD-10-CM

## 2021-09-07 DIAGNOSIS — I10 BENIGN ESSENTIAL HTN: ICD-10-CM

## 2021-09-07 PROCEDURE — 93005 ELECTROCARDIOGRAM TRACING: CPT | Performed by: INTERNAL MEDICINE

## 2021-09-07 PROCEDURE — G8754 DIAS BP LESS 90: HCPCS | Performed by: INTERNAL MEDICINE

## 2021-09-07 PROCEDURE — G0463 HOSPITAL OUTPT CLINIC VISIT: HCPCS | Performed by: INTERNAL MEDICINE

## 2021-09-07 PROCEDURE — G8427 DOCREV CUR MEDS BY ELIG CLIN: HCPCS | Performed by: INTERNAL MEDICINE

## 2021-09-07 PROCEDURE — G8536 NO DOC ELDER MAL SCRN: HCPCS | Performed by: INTERNAL MEDICINE

## 2021-09-07 PROCEDURE — G8510 SCR DEP NEG, NO PLAN REQD: HCPCS | Performed by: INTERNAL MEDICINE

## 2021-09-07 PROCEDURE — 1101F PT FALLS ASSESS-DOCD LE1/YR: CPT | Performed by: INTERNAL MEDICINE

## 2021-09-07 PROCEDURE — 93010 ELECTROCARDIOGRAM REPORT: CPT | Performed by: INTERNAL MEDICINE

## 2021-09-07 PROCEDURE — G8752 SYS BP LESS 140: HCPCS | Performed by: INTERNAL MEDICINE

## 2021-09-07 PROCEDURE — 99214 OFFICE O/P EST MOD 30 MIN: CPT | Performed by: INTERNAL MEDICINE

## 2021-09-07 PROCEDURE — G8417 CALC BMI ABV UP PARAM F/U: HCPCS | Performed by: INTERNAL MEDICINE

## 2021-09-07 RX ORDER — DILTIAZEM HYDROCHLORIDE 30 MG/1
30 TABLET, FILM COATED ORAL 3 TIMES DAILY
Qty: 90 TABLET | Refills: 1 | Status: SHIPPED | OUTPATIENT
Start: 2021-09-07 | End: 2021-11-04

## 2021-09-07 NOTE — PROGRESS NOTES
[facility] [address]: Barrett Strong  [phone]    HPI:   Mr. Georgia Acuña is a 67 yo M seen initially for afib with RVR noted on 1/21/13 ER visit, h/o CT followed by Dr. Debora Solitario for question of aortic dissection and pt was started on atenolol; CT chest was recommended but pt did not want \"extra radiation\". He was diagnosed with colon cancer and is status post recent sigmoid colectomy at the end of March. He is followed by oncology, Dr. Moy Hudson. On chemo for 1 out of 20 lymph nodes that was positive. Since his last visit, he is feeling a little better. He wore a heart monitor and was having several episodes of atrial fibrillation with rapid heart rate and recommended he increase his Atenolol to 50 mg twice a day. Symptomatically, he feels better and less palpitation. However, his EKG today his heart rate is still in the 130s and atrial fibrillation. He does have occasional dizziness. His breathing has been okay. He denies any exertional chest pain. He is still receiving chemotherapy. He is compensated on exam with clear lungs, bilateral 1+ lower extremity edema unchanged. Soc Hx. No tobacco.  . 1 child. [facility] . Fam Hx. Father side uncles CAD 52's  Assessment and Plan:   1. Paroxysmal atrial fibrillation. His heart rate is labile and elevated here. Will continue his higher dose of Atenolol. Will add Diltiazem. As he cannot swallow pills easily, will do this short acting 30 mg tid. He will follow with a virtual visit in two to three weeks as his wife has an upcoming surgery. We did talk in the past about possible need for pacemaker, AV node ablation depending on his tolerance of medication and his heart rate. 2. Chronic anticoagulation. No bleeding issues on Eliquis. 3. Essential hypertension. Blood pressure is controlled. 4. Colon cancer, status post colectomy. Followed by oncology and he is getting chemotherapy.       He  has a past medical history of Essential hypertension. He also has no past medical history of Diabetes or Hyperlipidemia. All other systems negative except as above. PE  Filed Vitals:    01/30/14 0947 01/30/14 0951   BP: 140/80 136/78   Pulse: 56    Resp: 18    Height: 5' 11\" (1.803 m)    Weight: 234 lb 9.6 oz (106.414 kg)     Body mass index is 32.73 kg/(m^2). General appearance - alert, well appearing, and in no distress  Mental status - affect appropriate to mood  Eyes - sclera anicteric, moist mucous membranes  Neck - supple, no JVD  Chest - clear to auscultation, no wheezes, rales or rhonchi  Heart - irregularly irregular, normal S1, S2, I/VI systolic murmur LUSB  Abdomen - soft, nontender, nondistended  Extremities - peripheral pulses normal, trace pedal edema  Recent Labs:  No results found for this basename: CHOL,  [id number],  CHOLX,  CHOLP,  CHLST,  CHOLV,  [id number],  HDL,  [id number],  HDLC,  HDLP,  LDL,  DLDL,  LDLC,  DLDLP,  TGL,  Liliana [place],  [id number],  TRIGP,  CHHD,  CHHDX     Lab Results   Component Value Date/Time    Creatinine 0.51 1/21/2013  3:40 AM     Lab Results   Component Value Date/Time    BUN 16 1/21/2013  3:40 AM     Lab Results   Component Value Date/Time    Potassium 3.7 1/21/2013  3:40 AM     No results found for this basename: HBA1C,  HGBE8     Lab Results   Component Value Date/Time    HGB 13.8 1/21/2013  3:40 AM     Lab Results   Component Value Date/Time    PLATELET 429 2/43/4368  3:40 AM       Reviewed:  Past Medical History   Diagnosis Date    Essential hypertension      History   Smoking status    Never Smoker    Smokeless tobacco    Never Used     History   Alcohol Use    Yes     Comment: Occasional     No Known Allergies    Current Outpatient Prescriptions   Medication Sig    OTHER Heart Herbal Formula ([place] berry, garlic, eulalia, cactus stem and flower, red clover, motherwort herb, habanero pepper) Couple of drops 2-3 days a week    atenolol (TENORMIN) 25 mg tablet Take 1 Tab by mouth daily.     digoxin (LANOXIN) 0.125 mg tablet Take 1 Tab by mouth every Monday, Wednesday, Friday.  folic acid 109 mcg tablet Take 400 mcg by mouth daily.  DOCOSAHEXANOIC ACID/EPA (FISH OIL PO) Take  by mouth. 1 tblspn daily     coenzyme q10-vitamin e (COQ10 ) 100-100 mg-unit cap Take 1 Tab by mouth daily.  aspirin (ASPIRIN) 325 mg tablet Take 1 Tab by mouth daily. No current facility-administered medications for this visit.        Mell Perdomo MD  [address] [facility] heart and Vascular [place]  [address], [address]  [place], [address] [place] [address] Avenue

## 2021-09-07 NOTE — LETTER
Patient:  Kenneth Lopez   YOB: 1945  Date of Visit: 9/7/2021      Dear Saravanan Zabala MD  [address] [facility]  [address] [facility] [address]  Via Fax: [phone]     Manuel Whitmore MD  [address] [facility] [address]  [address] [address]  Via Fax: [phone]:    Mr. Katherine De Los Santos is a 67 yo M seen initially for afib with RVR noted on 1/21/13 ER visit, h/o CT followed by Dr. Meliza Bone for question of aortic dissection and pt was started on atenolol; CT chest was recommended but pt did not want \"extra radiation\". He was diagnosed with colon cancer and is status post recent sigmoid colectomy at the end of March. He is followed by oncology, Dr. Niya Whitmore. On chemo for 1 out of 20 lymph nodes that was positive. Since his last visit, he is feeling a little better. He wore a heart monitor and was having several episodes of atrial fibrillation with rapid heart rate and recommended he increase his Atenolol to 50 mg twice a day. Symptomatically, he feels better and less palpitation. However, his EKG today his heart rate is still in the 130s and atrial fibrillation. He does have occasional dizziness. His breathing has been okay. He denies any exertional chest pain. He is still receiving chemotherapy. He is compensated on exam with clear lungs, bilateral 1+ lower extremity edema unchanged. Soc Hx. No tobacco.  . 1 child. [facility] . Fam Hx. Father side uncles CAD 52's  Assessment and Plan:   1. Paroxysmal atrial fibrillation. His heart rate is labile and elevated here. Will continue his higher dose of Atenolol. Will add Diltiazem. As he cannot swallow pills easily, will do this short acting 30 mg tid. He will follow with a virtual visit in two to three weeks as his wife has an upcoming surgery. We did talk in the past about possible need for pacemaker, AV node ablation depending on his tolerance of medication and his heart rate. 2. Chronic anticoagulation.   No bleeding issues on Eliquis. 3. Essential hypertension. Blood pressure is controlled. 4. Colon cancer, status post colectomy. Followed by oncology and he is getting chemotherapy. If you have questions, please do not hesitate to call me.     Sincerely,      Betito Reese MD

## 2021-09-24 ENCOUNTER — VIRTUAL VISIT (OUTPATIENT)
Dept: CARDIOLOGY CLINIC | Age: 76
End: 2021-09-24
Payer: MEDICARE

## 2021-09-24 DIAGNOSIS — I48.0 PAROXYSMAL A-FIB (HCC): Primary | ICD-10-CM

## 2021-09-24 DIAGNOSIS — I10 BENIGN ESSENTIAL HTN: ICD-10-CM

## 2021-09-24 DIAGNOSIS — Z79.01 CHRONIC ANTICOAGULATION: ICD-10-CM

## 2021-09-24 DIAGNOSIS — C18.9 MALIGNANT NEOPLASM OF COLON, UNSPECIFIED PART OF COLON (HCC): ICD-10-CM

## 2021-09-24 PROCEDURE — 1101F PT FALLS ASSESS-DOCD LE1/YR: CPT | Performed by: INTERNAL MEDICINE

## 2021-09-24 PROCEDURE — G8756 NO BP MEASURE DOC: HCPCS | Performed by: INTERNAL MEDICINE

## 2021-09-24 PROCEDURE — G0463 HOSPITAL OUTPT CLINIC VISIT: HCPCS | Performed by: INTERNAL MEDICINE

## 2021-09-24 PROCEDURE — G8432 DEP SCR NOT DOC, RNG: HCPCS | Performed by: INTERNAL MEDICINE

## 2021-09-24 PROCEDURE — G8427 DOCREV CUR MEDS BY ELIG CLIN: HCPCS | Performed by: INTERNAL MEDICINE

## 2021-09-24 PROCEDURE — 99214 OFFICE O/P EST MOD 30 MIN: CPT | Performed by: INTERNAL MEDICINE

## 2021-09-24 NOTE — PROGRESS NOTES
VIRTUAL VISIT DOCUMENTATION     Pursuant to the emergency declaration under the [facility], [facility] waiver authority and the [employer] Act, this Virtual  Visit was conducted, with patient's consent, to reduce the patient's risk of exposure to COVID-19 and provide continuity of care for an established patient. Services were provided through a video synchronous discussion virtually to substitute for in-person clinic visit. CHIEF COMPLAINT      Ani Ruiz is a 64 y.o. male who was seen by synchronous (real-time) audio-video technology on 9/24/2021. HISTORY OF PRESENTING ILLNESS      9/7/21  Mr. Brenna Simental is a 65 yo M seen initially for afib with RVR noted on 1/21/13 ER visit, h/o CT followed by Dr. Vicki Morrow for question of aortic dissection and pt was started on atenolol; CT chest was recommended but pt did not want \"extra radiation\". He was diagnosed with colon cancer and is status post recent sigmoid colectomy at the end of March. He is followed by oncology, Dr. Allene Olszewski. On chemo for 1 out of 20 lymph nodes that was positive. On his last visit due to elevated heart rate, had him add Diltiazem to his Atenolol. He has been tolerating the medication, but his heart rate has still been in the 120s-130s and we checked his blood pressure and it is between 85 and 568 systolic. He denies any chest pain. His breathing has been okay, but he does get dizzy when he tries to get up and do things. He is finishing possibly an infusion chemotherapy within the next month or two and followed closely by oncology, Dr. Allene Olszewski. Soc Hx. No tobacco.  . 1 child. [facility] . Fam Hx. Father side uncles CAD 52's  Assessment and Plan:   1. Paroxysmal atrial fibrillation. His heart rate is still elevated despite optimizing medical therapy with beta blocker, calcium channel blocker.   Do not have good options going forward because limited by his blood pressure. I do think he would benefit from seeing Dr. Stella Reyes to talk about pacemaker and AV node ablation and he is agreeable to this. Will set this up within the next few weeks. 2. Chronic anticoagulation. No bleeding issues on Eliquis. 3. Essential hypertension. Blood pressure is controlled. 4. Colon cancer, status post colectomy. Followed by oncology and he is getting chemotherapy with Dr. Charlane Boas. ASSESSMENT/PLAN:         We discussed the expected course, resolution and complications of the diagnosis(es) in detail. Medication risks, benefits, costs, interactions, and alternatives were discussed as indicated. I advised him to contact the office if his condition worsens, changes or fails to improve as anticipated.  He expressed understanding with the diagnosis(es) and plan       ACTIVE PROBLEM LIST     Patient Active Problem List    Diagnosis Date Noted    [facility] ([facility] 03/26/2021    Paroxysmal A-fib ([facility] 02/25/2016    Afib ([facility] 01/24/2013           PAST MEDICAL HISTORY     Past Medical History:   Diagnosis Date    Arrhythmia     a-fib    Cancer ([facility] 03/2021    Colon    Essential hypertension            PAST SURGICAL HISTORY     Past Surgical History:   Procedure Laterality Date    COLONOSCOPY N/A 1/26/2021    COLON[facility] performed by [facility]cca [facility] at [facility] ENDOSCOPY    COLONOSCOPY,RE[facility] [place]  1/26/2021         COLONOSCPY,FLEX,W/ [address] INJECT  1/26/2021         HX TONSILLECTOMY  as a child          ALLERGIES     No Known Allergies       FAMILY HISTORY     Family History   Problem Relation Age of Onset    Coronary Artery Disease Paternal Uncle     Dementia Mother     Heart Disease Father     Dementia Father     negative for cardiac disease       SOCIAL HISTORY     Social History     Socioeconomic History    Marital status:      Spouse name: Not on file    Number of children: Not on file    Years of education: Not on file    Highest education level: Not on file   Tobacco Use    Smoking status: Never Smoker    Smokeless tobacco: Never Used   Vaping Use    Vaping Use: Never used   Substance and Sexual Activity    Alcohol use: Not Currently     Comment: >5yrs    Drug use: Yes     Types: Prescription, OTC    Sexual activity: Yes     Partners: Female     Social Determinants of Health     Financial Resource Strain:     Difficulty of Paying Living Expenses:    Food Insecurity:     Worried About Running Out of Food in the Last Year:     Ran Out of Food in the Last Year:    Transportation Needs:     Lack of Transportation (Medical):  Lack of Transportation (Non-Medical):    Physical Activity:     Days of Exercise per Week:     Minutes of Exercise per Session:    Stress:     Feeling of Stress :    Social Connections:     Frequency of Communication with Friends and Family:     Frequency of Social Gatherings with Friends and Family:     Attends Restoration Services:     Active Member of Clubs or Organizations:     Attends Club or Organization Meetings:     Marital Status:          MEDICATIONS     Current Outpatient Medications   Medication Sig    dilTIAZem IR (CARDIZEM) 30 mg tablet Take 1 Tablet by mouth three (3) times daily.  atenoloL (TENORMIN) 50 mg tablet Take 1 Tablet by mouth two (2) times a day.  dexAMETHasone (DECADRON) 4 mg tablet With chemo    digoxin (LANOXIN) 0.125 mg tablet TAKE 1 TABLET EVERY MONDAY, WEDNESDAY AND FRIDAY    apixaban (Eliquis) 5 mg tablet TAKE 1 TABLET TWICE A DAY     No current facility-administered medications for this visit. I have reviewed the nurses notes, vitals, problem list, allergy list, medical history, family, social history and medications. REVIEW OF SYMPTOMS     Constitutional: Negative for fever, chills, malaise/fatigue and diaphoresis.    Respiratory: Negative for cough, hemoptysis, sputum production, shortness of breath and wheezing. Cardiovascular: Negative for chest pain, palpitations, orthopnea, claudication, leg swelling and PND. Gastrointestinal: Negative for heartburn, nausea, vomiting, blood in stool and melena. Genitourinary: Negative for dysuria and flank pain. Musculoskeletal: Negative for joint pain and back pain. Skin: Negative for rash. Neurological: Negative for focal weakness, seizures, loss of consciousness, weakness and headaches. Endo/Heme/Allergies: Negative for abnormal bleeding. Psychiatric/Behavioral: Negative for memory loss. PHYSICAL EXAMINATION      Due to this being a TeleHealth evaluation, many elements of the physical examination are unable to be assessed. General: Well developed, in no acute distress, cooperative and alert  HEENT: Pupils equal/round. No marked JVD visible on video. Respiratory: No audible wheezing, no signs of respiratory distress, lips non cyanotic  Extremities:  No edema  Neuro: A&Ox3, speech clear, no facial droop, answering questions appropriately  Skin: Skin color is normal. No rashes or lesions. Non diaphoretic on visible skin during exam       DIAGNOSTIC DATA      No specialty comments available. LABORATORY DATA      Lab Results   Component Value Date/Time    WBC 21.7 (H) 03/27/2021 01:44 AM    HGB 12.8 03/27/2021 01:44 AM    HCT 38.6 03/27/2021 01:44 AM    PLATELET 977 74/99/4448 01:44 AM    MCV 90.4 03/27/2021 01:44 AM      Lab Results   Component Value Date/Time    Sodium 136 03/27/2021 01:44 AM    Potassium 4.1 03/27/2021 01:44 AM    Chloride 104 03/27/2021 01:44 AM    CO2 27 03/27/2021 01:44 AM    Anion gap 5 03/27/2021 01:44 AM    Glucose 182 (H) 03/27/2021 01:44 AM    BUN 9 03/27/2021 01:44 AM    Creatinine 0.89 03/27/2021 01:44 AM    BUN/Creatinine ratio 10 (L) 03/27/2021 01:44 AM    GFR est AA >60 03/27/2021 01:44 AM    GFR est non-AA >60 03/27/2021 01:44 AM    Calcium 9.0 03/27/2021 01:44 AM    Bilirubin, total 0.9 03/17/2021 11:23 AM    Alk. phosphatase 88 03/17/2021 11:23 AM    Protein, total 8.2 03/17/2021 11:23 AM    Albumin 4.0 03/17/2021 11:23 AM    Globulin 4.2 (H) 03/17/2021 11:23 AM    A-G Ratio 1.0 (L) 03/17/2021 11:23 AM    ALT (SGPT) 21 03/17/2021 11:23 AM            Patient was made aware and verbalized understanding that an appointment will be scheduled for them for a virtual visit and/or office visit within the above time frame. Patient understanding his/her responsibility to call and change time/date if he/she so chooses. Greater than 20 minutes was spent in direct video patient care, planning and chart review. This visit was conducted using [employer] telemedicine services.        Annette Barros MD    [address] [place] , [address] [place] [address],[address]  Octavio Chauhan  [phone] / [phone] Fax

## 2021-09-28 ENCOUNTER — TELEPHONE (OUTPATIENT)
Dept: CARDIOLOGY CLINIC | Age: 76
End: 2021-09-28

## 2021-09-28 NOTE — TELEPHONE ENCOUNTER
Returned patient call and identity verified by two identifiers. Patient called to verify his appointment time/date coordinated for his upcoming appointment with Dr. Bernarda Snellen. Appointment details provided. Patient verbalized all understanding and had no additional questions.     Future Appointments   Date Time Provider Zoe Cyr   11/4/2021  3:40 PM MD MARY Verma AMB

## 2021-09-28 NOTE — TELEPHONE ENCOUNTER
Patient called to follow up on appointment for Dr. Orquidea Soriano, he has not heard from anyone.       [facility]:[phone]

## 2021-11-04 ENCOUNTER — CLINICAL SUPPORT (OUTPATIENT)
Dept: CARDIOLOGY CLINIC | Age: 76
End: 2021-11-04
Payer: MEDICARE

## 2021-11-04 ENCOUNTER — OFFICE VISIT (OUTPATIENT)
Dept: CARDIOLOGY CLINIC | Age: 76
End: 2021-11-04
Payer: MEDICARE

## 2021-11-04 VITALS
HEART RATE: 90 BPM | OXYGEN SATURATION: 100 % | HEIGHT: 68 IN | SYSTOLIC BLOOD PRESSURE: 120 MMHG | RESPIRATION RATE: 16 BRPM | WEIGHT: 222 LBS | BODY MASS INDEX: 33.65 KG/M2 | DIASTOLIC BLOOD PRESSURE: 80 MMHG

## 2021-11-04 DIAGNOSIS — C18.9 MALIGNANT NEOPLASM OF COLON, UNSPECIFIED PART OF COLON (HCC): ICD-10-CM

## 2021-11-04 DIAGNOSIS — Z79.01 CHRONIC ANTICOAGULATION: ICD-10-CM

## 2021-11-04 DIAGNOSIS — R60.0 BILATERAL LEG EDEMA: ICD-10-CM

## 2021-11-04 DIAGNOSIS — I48.20 CHRONIC A-FIB (HCC): Primary | ICD-10-CM

## 2021-11-04 DIAGNOSIS — I10 BENIGN ESSENTIAL HTN: ICD-10-CM

## 2021-11-04 PROCEDURE — G8427 DOCREV CUR MEDS BY ELIG CLIN: HCPCS | Performed by: INTERNAL MEDICINE

## 2021-11-04 PROCEDURE — 93225 XTRNL ECG REC<48 HRS REC: CPT | Performed by: INTERNAL MEDICINE

## 2021-11-04 PROCEDURE — G8752 SYS BP LESS 140: HCPCS | Performed by: INTERNAL MEDICINE

## 2021-11-04 PROCEDURE — G8536 NO DOC ELDER MAL SCRN: HCPCS | Performed by: INTERNAL MEDICINE

## 2021-11-04 PROCEDURE — G8417 CALC BMI ABV UP PARAM F/U: HCPCS | Performed by: INTERNAL MEDICINE

## 2021-11-04 PROCEDURE — 1101F PT FALLS ASSESS-DOCD LE1/YR: CPT | Performed by: INTERNAL MEDICINE

## 2021-11-04 PROCEDURE — 99205 OFFICE O/P NEW HI 60 MIN: CPT | Performed by: INTERNAL MEDICINE

## 2021-11-04 PROCEDURE — G8510 SCR DEP NEG, NO PLAN REQD: HCPCS | Performed by: INTERNAL MEDICINE

## 2021-11-04 PROCEDURE — 93227 XTRNL ECG REC<48 HR R&I: CPT | Performed by: INTERNAL MEDICINE

## 2021-11-04 PROCEDURE — G0463 HOSPITAL OUTPT CLINIC VISIT: HCPCS | Performed by: INTERNAL MEDICINE

## 2021-11-04 PROCEDURE — G8754 DIAS BP LESS 90: HCPCS | Performed by: INTERNAL MEDICINE

## 2021-11-04 RX ORDER — ATENOLOL 50 MG/1
50 TABLET ORAL 3 TIMES DAILY
Qty: 270 TABLET | Refills: 1 | Status: SHIPPED | OUTPATIENT
Start: 2021-11-04 | End: 2021-11-15 | Stop reason: SDUPTHER

## 2021-11-04 NOTE — PATIENT INSTRUCTIONS
Have 24 hour holter placed. Our office will call you with results. Stop taking cardizem. Increase atenolol frequency to 50 mg three times a day.

## 2021-11-04 NOTE — PROGRESS NOTES
Cardiac Electrophysiology OFFICE Consultation Note     Subjective:      Isabel Gordon is a 64 y.o. patient who is seen for evaluation of atrial fibrillation with rapid ventricular rate  Holter monitor August 2021 with atrial fibrillation and rapid ventricular rate average 138 bpm, maximum 160 bpm and minimum 127 bpm.  208 PVCs  Recommend atenolol 50 mg bid then in August  He is now concerned about addition of cardizem tid that caused his leg more swollen when he saw Dr Nubia Edmondson so Dr Nubia Edmondson asked me to see him for pacer implant and av node ablation   He had somewhat HR controlled with more medications but has tendency to get dizzy with lower bp  His BP is good today  He had colectomy in March and is seen by Dr Fredy Kingsley at [facility]  He said he is done with chemo for now      Patient Active Problem List   Diagnosis Code    [facility]ib [facility] I48.91    Paroxysmal A-fib ([facility] I48.0    Colon cancer ([facility] C18.9     Current Outpatient Medications   Medication Sig Dispense Refill    atenoloL (TENORMIN) 50 mg tablet Take 1 Tablet by mouth three (3) times daily.  270 Tablet 1    digoxin (LANOXIN) 0.125 mg tablet TAKE 1 TABLET EVERY MONDAY, WEDNESDAY AND FRIDAY 36 Tab 3    apixaban (Eliquis) 5 mg tablet TAKE 1 TABLET TWICE A  Tab 3     No Known Allergies  Past Medical History:   Diagnosis Date    Arrhythmia     a-fib    Cancer ([facility] 03/2021    Colon    Essential hypertension      Past Surgical History:   Procedure Laterality Date    COLONOSCOPY N/A 1/26/2021    COLONOS[facility] performed by [facility] M [facility]ajardo [facility]  1/26/2021         COLONOSCPY,FLEX,W/ [address] INJECT  1/26/2021         HX TONSILLECTOMY  as a child     Family History   Problem Relation Age of Onset    Coronary Art Dis Paternal Uncle     Dementia Mother     Heart Disease Father     Dementia Father      Social History     Tobacco Use    Smoking status: Never Smoker    Smokeless tobacco: Never Used   Substance Use Topics    Alcohol use: Not Currently     Comment: >5yrs        Review of Systems:   Constitutional: Negative for fever, chills, weight loss, + malaise/fatigue. HEENT: Negative for nosebleeds, vision changes. Respiratory: Negative for cough, hemoptysis  Cardiovascular: Negative for chest pain, palpitations, orthopnea, claudication, leg swelling, syncope, and PND. Gastrointestinal: Negative for nausea, vomiting, diarrhea, blood in stool and melena. Genitourinary: Negative for dysuria, and hematuria. Musculoskeletal: Negative for myalgias, arthralgia. Skin: Negative for rash. Heme: Does not bleed or bruise easily. Neurological: Negative for speech change and focal weakness     Objective:     Visit Vitals  /80   Pulse 90   Resp 16   Ht 5' 8\" (1.727 m)   Wt 222 lb (100.7 kg)   SpO2 100%   BMI 33.75 kg/m²      Physical Exam:   Constitutional: well-developed and well-nourished. No respiratory distress. Head: Normocephalic and atraumatic. Eyes: Pupils are equal, round  ENT: hearing normal  Neck: supple. No JVD present. Cardiovascular: Normal rate, irregular rhythm. Exam reveals no gallop and no friction rub. No murmur heard. Pulmonary/Chest: Effort normal and breath sounds normal. No wheezes. Right chest port a cath  Abdominal: Soft, obese  Musculoskeletal: 2+ ;eg edema. Neurological: alert,oriented. Skin: Skin is warm and dry  Psychiatric: normal mood and affect. Behavior is normal. Judgment and thought content normal.         Assessment/Plan:       ICD-10-CM ICD-9-CM    1. Chronic a-fib (HCC)  I48.20 427.31 CARDIAC HOLTER MONITOR   2. Chronic anticoagulation  Z79.01 V58.61    3. Malignant neoplasm of colon, unspecified part of colon ([facility]  C18.9 153.9    4. Benign essential HTN  I10 401.1    5.  Bilateral leg edema  R60.0 782.3       I have discussed with him about risks and benefits of av node ablation and pacemaker implant  He does not like idea of leadless pacer and av node ablation He may agree to transvenous pacer as he has current port a cath on right chest and can associate with it  He is concerned about having pacemaker controlling his rate  He wants to assess rate and I recommend a 24 holter and I agree with stopping cardizem and use atenolol 50 mg tid  If rate is not controlled or if he cannot tolerate medication again then he needs to reconsider option and risks and benefits of pacer and av node ablation that we discussed today  Continue with digoxin and NOAC  He will need 2 D echo if proceeding   Risks involve device implant and av node ablation include but are not limited to bleeding, infection, valvular damage, heart attack, stroke, lung collapse (pneumothorax or hemothorax), heart collapse (pericardial tamponade), heart perforation, kidney failure, death. Elective or emergency surgery may be required to repair some of these complications. Prolonged hospitalization would be required. General anesthesia may be required for the procedure. Addendum  holter HR  and average 124 bpm, AFIB persistent, 96 PVC   May go up on atenolol to 100 mg bid   I recommend av node ablation and pacemaker     Thank you for involving me in this patient's care and please call with further concerns or questions. Rubén Santoro M.D.   Electrophysiology/Cardiology  [facility]  [address] [place] [address]                             [phone]

## 2021-11-05 ENCOUNTER — TRANSCRIBE ORDER (OUTPATIENT)
Dept: SCHEDULING | Age: 76
End: 2021-11-05

## 2021-11-05 DIAGNOSIS — J22 UNSPECIFIED ACUTE LOWER RESPIRATORY INFECTION: ICD-10-CM

## 2021-11-05 DIAGNOSIS — T45.1X5A AGRANULOCYTOSIS SECONDARY TO CANCER CHEMOTHERAPY (HCC): ICD-10-CM

## 2021-11-05 DIAGNOSIS — D70.1 AGRANULOCYTOSIS SECONDARY TO CANCER CHEMOTHERAPY (HCC): ICD-10-CM

## 2021-11-05 DIAGNOSIS — C18.7 MALIGNANT NEOPLASM OF SIGMOID COLON (HCC): Primary | ICD-10-CM

## 2021-11-05 DIAGNOSIS — Z51.11 ENCOUNTER FOR ANTINEOPLASTIC CHEMOTHERAPY: ICD-10-CM

## 2021-11-08 ENCOUNTER — TRANSCRIBE ORDER (OUTPATIENT)
Dept: SCHEDULING | Age: 76
End: 2021-11-08

## 2021-11-08 DIAGNOSIS — C18.7 MALIGNANT NEOPLASM OF SIGMOID COLON (HCC): Primary | ICD-10-CM

## 2021-11-12 ENCOUNTER — TELEPHONE (OUTPATIENT)
Dept: CARDIOLOGY CLINIC | Age: 76
End: 2021-11-12

## 2021-11-12 NOTE — TELEPHONE ENCOUNTER
----- Message from Sandy Shen MD sent at 11/12/2021  4:08 PM EST -----  holter HR  and average 124 bpm, AFIB persistent, 96 PVC   May go up on atenolol to 100 mg bid   I recommend av node ablation and pacemaker

## 2021-11-15 ENCOUNTER — HOSPITAL ENCOUNTER (OUTPATIENT)
Dept: PET IMAGING | Age: 76
Discharge: HOME | End: 2021-11-15
Attending: INTERNAL MEDICINE
Payer: MEDICARE

## 2021-11-15 DIAGNOSIS — C18.7 MALIGNANT NEOPLASM OF SIGMOID COLON (HCC): ICD-10-CM

## 2021-11-15 LAB
GLUCOSE BLD STRIP.AUTO-MCNC: 109 MG/DL (ref 65–117)
SERVICE CMNT-IMP: NORMAL

## 2021-11-15 PROCEDURE — A9552 F18 FDG: HCPCS

## 2021-11-15 RX ORDER — SODIUM CHLORIDE 0.9 % (FLUSH) 0.9 %
10 SYRINGE (ML) INJECTION
Status: COMPLETED | OUTPATIENT
Start: 2021-11-15 | End: 2021-11-15

## 2021-11-15 RX ORDER — FLUDEOXYGLUCOSE F-18 200 MCI/ML
10 INJECTION INTRAVENOUS ONCE
Status: COMPLETED | OUTPATIENT
Start: 2021-11-15 | End: 2021-11-15

## 2021-11-15 RX ORDER — ATENOLOL 100 MG/1
100 TABLET ORAL 2 TIMES DAILY
Qty: 180 TABLET | Refills: 1 | Status: SHIPPED | OUTPATIENT
Start: 2021-11-15 | End: 2022-03-23

## 2021-11-15 RX ADMIN — FLUDEOXYGLUCOSE F-18 10 MILLICURIE: 200 INJECTION INTRAVENOUS at 07:19

## 2021-11-15 RX ADMIN — Medication 10 ML: at 07:19

## 2021-11-15 NOTE — TELEPHONE ENCOUNTER
Verified patient with two types of identifiers. Notified patient of results and MD recommendations. Patient still does not want to commit to AV node ablation and pacemaker. Will update MD and discuss follow up. Patient verbalized understanding and will call with any other questions.

## 2021-11-17 NOTE — TELEPHONE ENCOUNTER
Verified patient with two types of identifiers. Scheduled patient of 3 month follow up with Russell Car NP. Patient verbalized understanding and will call with any other questions.       Future Appointments   Date Time Provider Zoe Cyr   2/8/2022  7:00 AM [facility] CT 1 [facility]   2/18/2022  9:00 AM KEYA Delacruz AMB

## 2021-12-10 ENCOUNTER — HOSPITAL ENCOUNTER (OUTPATIENT)
Dept: PREADMISSION TESTING | Age: 76
Discharge: HOME | End: 2021-12-10
Payer: MEDICARE

## 2021-12-10 PROCEDURE — U0005 INFEC AGEN DETEC AMPLI PROBE: HCPCS

## 2021-12-12 LAB
SARS-COV-2, XPLCVT: NOT DETECTED
SOURCE, COVRS: NORMAL

## 2021-12-15 ENCOUNTER — HOSPITAL ENCOUNTER (OUTPATIENT)
Age: 76
Setting detail: OUTPATIENT SURGERY
Discharge: HOME | End: 2021-12-15
Attending: INTERNAL MEDICINE | Admitting: INTERNAL MEDICINE
Payer: MEDICARE

## 2021-12-15 ENCOUNTER — ANESTHESIA EVENT (OUTPATIENT)
Dept: ENDOSCOPY | Age: 76
End: 2021-12-15
Payer: MEDICARE

## 2021-12-15 ENCOUNTER — ANESTHESIA (OUTPATIENT)
Dept: ENDOSCOPY | Age: 76
End: 2021-12-15
Payer: MEDICARE

## 2021-12-15 VITALS
HEIGHT: 68 IN | RESPIRATION RATE: 30 BRPM | TEMPERATURE: 98.2 F | HEART RATE: 113 BPM | BODY MASS INDEX: 33.65 KG/M2 | WEIGHT: 222 LBS | SYSTOLIC BLOOD PRESSURE: 107 MMHG | OXYGEN SATURATION: 91 % | DIASTOLIC BLOOD PRESSURE: 72 MMHG

## 2021-12-15 PROCEDURE — 74011250637 HC RX REV CODE- 250/637: Performed by: INTERNAL MEDICINE

## 2021-12-15 PROCEDURE — 76060000032 HC ANESTHESIA 0.5 TO 1 HR: Performed by: INTERNAL MEDICINE

## 2021-12-15 PROCEDURE — 76040000007: Performed by: INTERNAL MEDICINE

## 2021-12-15 PROCEDURE — 77030039825 HC MSK NSL PAP SUPERNO2VA VYRM -B: Performed by: NURSE ANESTHETIST, CERTIFIED REGISTERED

## 2021-12-15 PROCEDURE — 2709999900 HC NON-CHARGEABLE SUPPLY: Performed by: INTERNAL MEDICINE

## 2021-12-15 PROCEDURE — 74011250636 HC RX REV CODE- 250/636: Performed by: NURSE ANESTHETIST, CERTIFIED REGISTERED

## 2021-12-15 PROCEDURE — 74011250636 HC RX REV CODE- 250/636: Performed by: INTERNAL MEDICINE

## 2021-12-15 RX ORDER — SODIUM CHLORIDE 9 MG/ML
25 INJECTION, SOLUTION INTRAVENOUS CONTINUOUS
Status: DISCONTINUED | OUTPATIENT
Start: 2021-12-15 | End: 2021-12-15 | Stop reason: HOSPADM

## 2021-12-15 RX ORDER — SODIUM CHLORIDE 0.9 % (FLUSH) 0.9 %
5-40 SYRINGE (ML) INJECTION AS NEEDED
Status: DISCONTINUED | OUTPATIENT
Start: 2021-12-15 | End: 2021-12-15 | Stop reason: HOSPADM

## 2021-12-15 RX ORDER — DEXTROMETHORPHAN/PSEUDOEPHED 2.5-7.5/.8
1.2 DROPS ORAL
Status: DISCONTINUED | OUTPATIENT
Start: 2021-12-15 | End: 2021-12-15 | Stop reason: HOSPADM

## 2021-12-15 RX ORDER — EPINEPHRINE 0.1 MG/ML
1 INJECTION INTRACARDIAC; INTRAVENOUS
Status: DISCONTINUED | OUTPATIENT
Start: 2021-12-15 | End: 2021-12-15 | Stop reason: HOSPADM

## 2021-12-15 RX ORDER — ATROPINE SULFATE 0.1 MG/ML
0.5 INJECTION INTRAVENOUS
Status: DISCONTINUED | OUTPATIENT
Start: 2021-12-15 | End: 2021-12-15 | Stop reason: HOSPADM

## 2021-12-15 RX ORDER — SODIUM CHLORIDE 0.9 % (FLUSH) 0.9 %
5-40 SYRINGE (ML) INJECTION EVERY 8 HOURS
Status: DISCONTINUED | OUTPATIENT
Start: 2021-12-15 | End: 2021-12-15 | Stop reason: HOSPADM

## 2021-12-15 RX ORDER — PROPOFOL 10 MG/ML
INJECTION, EMULSION INTRAVENOUS AS NEEDED
Status: DISCONTINUED | OUTPATIENT
Start: 2021-12-15 | End: 2021-12-15 | Stop reason: HOSPADM

## 2021-12-15 RX ORDER — NALOXONE HYDROCHLORIDE 0.4 MG/ML
0.4 INJECTION, SOLUTION INTRAMUSCULAR; INTRAVENOUS; SUBCUTANEOUS
Status: DISCONTINUED | OUTPATIENT
Start: 2021-12-15 | End: 2021-12-15 | Stop reason: HOSPADM

## 2021-12-15 RX ORDER — FLUMAZENIL 0.1 MG/ML
0.2 INJECTION INTRAVENOUS
Status: DISCONTINUED | OUTPATIENT
Start: 2021-12-15 | End: 2021-12-15 | Stop reason: HOSPADM

## 2021-12-15 RX ORDER — PHENYLEPHRINE HCL IN 0.9% NACL 0.4MG/10ML
SYRINGE (ML) INTRAVENOUS AS NEEDED
Status: DISCONTINUED | OUTPATIENT
Start: 2021-12-15 | End: 2021-12-15 | Stop reason: HOSPADM

## 2021-12-15 RX ADMIN — SODIUM CHLORIDE 25 ML/HR: 9 INJECTION, SOLUTION INTRAVENOUS at 10:29

## 2021-12-15 RX ADMIN — Medication 40 MCG: at 11:09

## 2021-12-15 RX ADMIN — SIMETHICONE 80 MG: 20 SUSPENSION/ DROPS ORAL at 11:10

## 2021-12-15 RX ADMIN — PROPOFOL 25 MG: 10 INJECTION, EMULSION INTRAVENOUS at 10:58

## 2021-12-15 RX ADMIN — PROPOFOL 25 MG: 10 INJECTION, EMULSION INTRAVENOUS at 10:49

## 2021-12-15 RX ADMIN — PROPOFOL 30 MG: 10 INJECTION, EMULSION INTRAVENOUS at 10:47

## 2021-12-15 RX ADMIN — PROPOFOL 50 MG: 10 INJECTION, EMULSION INTRAVENOUS at 10:46

## 2021-12-15 RX ADMIN — Medication 40 MCG: at 11:06

## 2021-12-15 RX ADMIN — PROPOFOL 25 MG: 10 INJECTION, EMULSION INTRAVENOUS at 10:55

## 2021-12-15 RX ADMIN — Medication 40 MCG: at 11:13

## 2021-12-15 RX ADMIN — PROPOFOL 25 MG: 10 INJECTION, EMULSION INTRAVENOUS at 10:52

## 2021-12-15 NOTE — ANESTHESIA PREPROCEDURE EVALUATION
Relevant Problems   CARDIOVASCULAR   (+) Afib (HCC)   (+) Paroxysmal A-fib (HCC)      PERSONAL HX & FAMILY HX OF CANCER   (+) Colon cancer (HCC)       Anesthetic History   No history of anesthetic complications            Review of Systems / Medical History  Patient summary reviewed, nursing notes reviewed and pertinent labs reviewed    Pulmonary  Within defined limits                 Neuro/Psych   Within defined limits           Cardiovascular  Within defined limits  Hypertension: well controlled        Dysrhythmias : atrial fibrillation and atrial flutter      Exercise tolerance: >4 METS  Comments: Patient taking Apixaban (Eliquis) - last taken on 12/12/21    ECG (9/7/21):   Atrial flutter with variable conduction  non specific ST/T abnorliaty   GI/Hepatic/Renal               Comments: Abnormal finding on GI tract imaging     Hx Colon cancer s/p robotic sigmoid colectomy (3/26/21) Endo/Other        Obesity and cancer (Hx Colon cancer s/p robotic sigmoid colectomy (3/26/21))     Other Findings              Physical Exam    Airway  Mallampati: II  TM Distance: > 6 cm  Neck ROM: normal range of motion   Mouth opening: Normal     Cardiovascular  Regular rate and rhythm,  S1 and S2 normal,  no murmur, click, rub, or gallop             Dental    Dentition: Poor dentition  Comments: Multiple missing and damaged teeth   Pulmonary  Breath sounds clear to auscultation               Abdominal  GI exam deferred       Other Findings            Anesthetic Plan    ASA: 3  Anesthesia type: MAC and total IV anesthesia          Induction: Intravenous  Anesthetic plan and risks discussed with: Patient

## 2021-12-15 NOTE — ROUTINE PROCESS
Shanae [facility]  1945  [id number]    Situation:  Verbal report received from: Huma Juaquin  Procedure: Procedure(s):  COLONOSCOPY    Background:    Preoperative diagnosis: Abnormal finding on GI tract imaging [R93.3]  Postoperative diagnosis: Diverticulosis, Hemorrhoids    :  Dr. Asha Marcelino  Assistant(s): Endoscopy Technician-1: Stephanie Gonzales  Endoscopy RN-1: Roxana Rincon RN    Specimens: * No specimens in log *  H. Pylori  no    Assessment:  Intra-procedure medications     Anesthesia gave intra-procedure sedation and medications, see anesthesia flow sheet yes    Intravenous fluids: NS@ KVO     Vital signs stable     Abdominal assessment: round and soft     Recommendation:  Discharge patient per MD order.   Return to floor  Family or Friend   Permission to share finding with family or friend yes

## 2021-12-15 NOTE — DISCHARGE INSTRUCTIONS
Zunilda Segura  [id number]  1945    COLON DISCHARGE INSTRUCTIONS  Discomfort:  Redness at IV site- apply warm compress to area; if redness or soreness persist- contact your physician  There may be a slight amount of blood passed from the rectum  Gaseous discomfort- walking, belching will help relieve any discomfort  You may not operate a vehicle for 12 hours  You may not engage in an occupation involving machinery or appliances for rest of today  You may not drink alcoholic beverages for at least 12 hours  Avoid making any critical decisions for at least 24 hour  DIET:   High fiber diet. - however -  remember your colon is empty and a heavy meal will produce gas. Avoid these foods:  vegetables, fried / greasy foods, carbonated drinks for today  MEDICATION:  (See attached)     ACTIVITY:  You may not resume your normal daily activities until tomorrow AM; it is recommended that you spend the remainder of the day resting -  avoid any strenuous activity. CALL M.D. ANY SIGN OF:   Increasing pain, nausea, vomiting  Abdominal distension (swelling)  New increased bleeding (oral or rectal)  Fever (chills)  Pain in chest area  Bloody discharge from nose or mouth  Shortness of breath      IMPRESSION:  -- no polyps!!!  -- the cecum or upper colon thickening does NOT show any cancer or major concerns  -- diverticulosis, which is when small out-pouchings in the inner lining of the colon form, little stretched out pockets. This is very common, and a diet high in fiber with the addition of a daily fiber supplement (like 2 teaspoons of metamucil or psyllium husk fiber powder mixed in water) can help treat this! -- we saw some hemorrhoids, which are very common, and these are swollen veins at the anal canal or end of the rectum, which can bulge with constipation and straining or frequent bowel movements. Sometimes they cause bleeding, burning, pressure, or even pain.  A diet high in fiber helps, but using a daily fiber supplement (like 2 teaspoons of psyllium husk powder or metamucil) can help treat these.     Follow-up Instructions:   Call Dr. Tracee Connell for the results of procedure / biopsy in 7-10 days  Telephone # [phone]  Repeat colonoscopy in 3 years  Please ALSO set up an appt with Dr. Amilcar Mason [phone])  to establish with a primary care physician    Anant Boles MD

## 2021-12-15 NOTE — PROGRESS NOTES
1059:  Endoscope was pre-cleaned at the bedside immediately following procedure by Marie Burgess. 1118: Anesthesia reports 180 mg Propofol,  400 mL NS, 120 mcg Neosinephrine given during procedure. Received report from anesthesia staff on vital signs and status of patient.

## 2021-12-15 NOTE — PROCEDURES
NAME:  Leonie Araujo   :   1945   MRN:   [id number]     Date/Time:  12/15/2021 11:07 AM    Colonoscopy Operative Report    Procedure Type:  Colonoscopy diagnostic    Indications: abnormal CT - thickening in cecum, and hx of colon cancer  Pre-operative Diagnosis: see indication above  Post-operative Diagnosis:  See findings below  :  Yoko Caicedo MD  Referring Provider: Dr. Cheyenne Darby, Dr. Chaya Gonzalez  Exam:  Airway: clear, no airway problems anticipated  Heart: RRR, without gallops or rubs  Lungs: clear bilaterally without wheezes, crackles, or rhonchi  Abdomen: soft, nontender, nondistended, bowel sounds present  Mental Status: awake, alert and oriented to person, place and time    Sedation:  MAC anesthesia Propofol  Procedure Details:  After informed consent was obtained with all risks and benefits of procedure explained and preoperative exam completed, the patient was taken to the endoscopy suite and placed in the left lateral decubitus position. Upon sequential sedation as per above, a digital rectal exam was performed demonstrating internal and external hemorrhoids. The Olympus videocolonoscope  was inserted in the rectum and carefully advanced to the terminal ileum. The quality of preparation was good. The colonoscope was slowly withdrawn with careful evaluation between folds. Retroflexion in the rectum was completed demonstrating internal and external hemorrhoids. Findings:   ANUS: Anal exam reveals no masses but large hemorrhoids, sphincter tone is normal.   RECTUM: Rectal exam reveals no masses but large hemorrhoids. SIGMOID COLON: surgical absent, but colo-colonic anastomosis normal and healthy. DESCENDING COLON: Diverticulosis, otherwise normal.  TRANSVERSE COLON: The mucosa is normal with good vascular pattern and without ulcers, diverticula, and polyps. ASCENDING COLON: The mucosa is normal with good vascular pattern and without ulcers, diverticula, and polyps. CECUM: The appendiceal orifice appears normal. The ileocecal valve appears prominent, and may account for thickening, but no mass or other changes. There is not mass or concerns present, though  TERMINAL ILEUM: The terminal ileum was normal.       Specimen Removed:  none  Complications:  None. EBL:     None. Impression:  -- history of colon cancer s/p sigmoidectomy with abnormal cecal thickening on imaging  -- no thickening or concerns in cecum  -- healthy, intact anastomosis  -- left-sided diverticulosis    Recommendations:   -- high fiber diet  -- repeat colonoscopy in 3 years    Discharge Disposition:  Home in the company of a  when able to ambulate.       Ca Carvalho MD

## 2021-12-15 NOTE — H&P
Gastroenterology Outpatient History and Physical    Patient: Omi Cai    Physician: Octaviano Coto MD    Chief Complaint: colon cancer, abnormal PET CT  History of Present Illness: 63 yo F with colon cancer, PET CT    History:  Past Medical History:   Diagnosis Date    Arrhythmia     a-fib    Cancer ([facility] 2021    Colon    Essential hypertension       Past Surgical History:   Procedure Laterality Date    COLONOSCOPY N/A 2021    COLONOSCOPY performed by Shante Allen MD at [facility] ENDOSCOPY    COLONOSCOPY,REMV Javier   2021         COLONOSCPY,FLEX,W/ [address] INJECT  2021         HX TONSILLECTOMY  as a child      Social History     Socioeconomic History    Marital status:    Tobacco Use    Smoking status: Never Smoker    Smokeless tobacco: Never Used   Vaping Use    Vaping Use: Never used   Substance and Sexual Activity    Alcohol use: Not Currently     Comment: >5yrs    Drug use: Yes     Types: Prescription, OTC    Sexual activity: Yes     Partners: Female      Family History   Problem Relation Age of Onset    Coronary Art Dis Paternal Uncle     Dementia Mother     Heart Disease Father     Dementia Father       Patient Active Problem List   Diagnosis Code    Afib ([facility] I48.91    Paroxysmal A-fib ([facility] I48.0    Colon cancer ([facility] C18.9       Allergies: No Known Allergies  Medications:   Prior to Admission medications    Medication Sig Start Date End Date Taking? Authorizing Provider   atenoloL (TENORMIN) 100 mg tablet Take 1 Tablet by mouth two (2) times a day. 11/15/21  Yes Marla Escalera MD   digoxin (LANOXIN) 0.125 mg tablet TAKE 1 TABLET EVERY MONDAY, [facility] AND 21  Yes Tate Gonzalez MD   apixaban (Eliquis) 5 mg tablet TAKE 1 TABLET TWICE A DAY 21   Tate Gonzalez MD     Physical Exam:   Vital Signs: Blood pressure 109/72, pulse (!) 122, temperature 98.2 °F (36.8 °C), resp.  rate 23, height 5' 8\" (1.727 m), weight 100.7 kg (222 lb), SpO2 98 %.  General: well developed, well nourished   HEENT: unremarkable   Heart: regular rhythm no mumur    Lungs: clear   Abdominal:  benign   Neurological: unremarkable   Extremities: no edema     Findings/Diagnosis: abnormal CT, hx of colon cancer  Plan of Care/Planned Procedure: Colonoscopy with conscious/deep sedation    Signed:  Gretel Tadeo MD 12/15/2021

## 2022-02-13 NOTE — PROGRESS NOTES
Cardiac Electrophysiology OFFICE Note     Subjective:      Chrissy Elaine is a 64 y.o. patient who presents for follow up of persistent AF with RVR. Holter in 08/2021 showed 100% AF with RVR (avg rate 138 bpm, max 160 bpm & min 127 bpm). Occasional PVCs also noted. Subsequently started meds for rate control, has titrated up. Continues Atenolol 100 mg po bid & low dose digoxin. Medication dosage limited by low BP. States symptoms significantly improved with increase in atenolol, but now developing SOB at times again. Rates 80s-110s today. Dr. Anel Knight had recommended AV node ablation & pacemaker, but patient has declined thus far. BP controlled. Anticoagulated with Eliquis. Denies bleeding issues. Previous:  Lower extremity edema with diltiazem. Primary cardiologist is Dr. Heidy Cadet a cath right chest.    S/p colectomy in 03/2021, followed by Dr. Marlene Reyna at [address] [place] [address]. Completed chemo. Patient Active Problem List   Diagnosis Code    [facility] I48.91    Paroxysmal A-fib (HCC) I48.0    Colon cancer (HCC) C18.9     Current Outpatient Medications   Medication Sig Dispense Refill    digoxin (LANOXIN) 0.125 mg tablet Take 1 Tablet by mouth every other day. 45 Tablet 1    atenoloL (TENORMIN) 100 mg tablet Take 1 Tablet by mouth two (2) times a day.  180 Tablet 1    apixaban (Eliquis) 5 mg tablet TAKE 1 TABLET TWICE A  Tab 3     No Known Allergies  Past Medical History:   Diagnosis Date    Arrhythmia     a-fib    Cancer ([facility] 03/2021    Colon    Essential hypertension      Past Surgical History:   Procedure Laterality Date    COLONOSCOPY N/A 1/26/2021    COL[facility] Hart [facility] at [facility] ENDOSCOPY    COLONOSCOPY N/A 12/15/2021    COLONOSCOPY performed by [facility] at [facility]  12/15/2021         Brandon Chaidez  1/26/2021         COLONOSCPY,FLEX,W/ [address] INJECT  1/26/2021         HX TONSILLECTOMY  as a child     Family History   Problem Relation Age of Onset    Coronary Art Dis Paternal Uncle     Dementia Mother     Heart Disease Father     Dementia Father      Social History     Tobacco Use    Smoking status: Never Smoker    Smokeless tobacco: Never Used   Substance Use Topics    Alcohol use: Not Currently     Comment: >5yrs        Review of Systems:   Constitutional: Negative for fever, chills, weight loss, + malaise/fatigue. HEENT: Negative for nosebleeds, vision changes. Respiratory: Negative for cough, hemoptysis  Cardiovascular: Negative for chest pain, orthopnea, claudication, leg swelling, syncope, and PND. + SOB  Gastrointestinal: Negative for nausea, vomiting, diarrhea, blood in stool and melena. Genitourinary: Negative for dysuria, and hematuria. Musculoskeletal: Negative for myalgias, arthralgia. Skin: Negative for rash. Heme: Does not bleed or bruise easily. Neurological: Negative for speech change and focal weakness. Objective:     Visit Vitals  /82   Pulse (!) 115   Ht 5' 8\" (1.727 m)   Wt 228 lb (103.4 kg)   SpO2 100%   BMI 34.67 kg/m²      Physical Exam:   Constitutional: Well-developed and well-nourished. No respiratory distress. Head: Normocephalic and atraumatic. Eyes: Pupils are equal, round. ENT: Hearing grossly normal.  Neck: Supple. No JVD present. Cardiovascular: Borderline tachy rate, irregular rhythm. Exam reveals no gallop and no friction rub. No murmur heard. Pulmonary/Chest: Effort normal and breath sounds normal. No wheezes. Right chest port a cath. Abdominal: Soft, obese. Musculoskeletal: Moves extremities independently. Normal gait. Vasc/lymphatic: No leg edema. Neurological: Alert,oriented. Skin: Skin is warm and dry. Psychiatric: Normal mood and affect.  Behavior is normal. Judgment and thought content normal.         Assessment/Plan:     Imaging/Studies:  Holter (11/2021): HR  bpm, avg 124 bpm.  100% AF burden, rare PVCs.    Holter (08/2021): -160 bpm, avg 136 bpm.  100% AF burden. Rare PVCs. ICD-10-CM ICD-9-CM    1. Persistent atrial fibrillation (HCC)  I48.19 427.31 AMB POC EKG ROUTINE W/ 12 LEADS, INTER & REP   2. Chronic anticoagulation  Z79.01 V58.61 AMB POC EKG ROUTINE W/ 12 LEADS, INTER & REP   3. Benign essential HTN  I10 401.1 AMB POC EKG ROUTINE W/ 12 LEADS, INTER & REP        Persistent AF:  Noted via multiple holter monitors, now on digoxin & atenolol. Last known sinus rhythm was in 03/2021. Dr. Yamilex Hilario had reviewed risks/benefits of AV node ablation & pacemaker previously. Patient didn't like idea of leadless pacemaker, was more amenable to transvenous pacer, but still concerned regarding pacer dependency. Now with suboptimal control on current regimen. He reports low HR with daily digoxin. Will increase digoxin slightly to every other day for now. No imaging on file, will order 2D echo & allow him more time to consider AV node ablation & pacemaker. Anticoagulation: Continue Eliquis for embolic CVA prophylaxis. Denies bleeding issues. EP clinic follow up pending echo results & his decision regarding pacemaker & AV node ablation. Addendum  2 D echo 3/2022 normal LVEF, moderately dilated LA and mildly dilated RA, mild MR, mild to moderate TR  May consider AFIB ablation if he is not considering AV node ablation and pacer  RVR still seen with AF  Future Appointments   Date Time Provider Zoe Cyr   3/11/2022  8:00 AM ECHOSABRINA BS AMB      Thank you for involving me in this patient's care and please call with further concerns or questions.     AMILCAR Maldonado  [facility] [place]  02/18/22

## 2022-02-15 ENCOUNTER — HOSPITAL ENCOUNTER (OUTPATIENT)
Dept: CT IMAGING | Age: 77
Discharge: HOME | End: 2022-02-15
Attending: INTERNAL MEDICINE
Payer: MEDICARE

## 2022-02-15 DIAGNOSIS — T45.1X5A AGRANULOCYTOSIS SECONDARY TO CANCER CHEMOTHERAPY (HCC): ICD-10-CM

## 2022-02-15 DIAGNOSIS — Z51.11 ENCOUNTER FOR ANTINEOPLASTIC CHEMOTHERAPY: ICD-10-CM

## 2022-02-15 DIAGNOSIS — J22 UNSPECIFIED ACUTE LOWER RESPIRATORY INFECTION: ICD-10-CM

## 2022-02-15 DIAGNOSIS — C18.7 MALIGNANT NEOPLASM OF SIGMOID COLON (HCC): ICD-10-CM

## 2022-02-15 DIAGNOSIS — D70.1 AGRANULOCYTOSIS SECONDARY TO CANCER CHEMOTHERAPY (HCC): ICD-10-CM

## 2022-02-15 LAB — CREAT BLD-MCNC: 0.6 MG/DL (ref 0.6–1.3)

## 2022-02-15 PROCEDURE — 74011000250 HC RX REV CODE- 250: Performed by: INTERNAL MEDICINE

## 2022-02-15 PROCEDURE — 71260 CT THORAX DX C+: CPT

## 2022-02-15 PROCEDURE — 74177 CT ABD & PELVIS W/CONTRAST: CPT

## 2022-02-15 PROCEDURE — 74011000636 HC RX REV CODE- 636: Performed by: INTERNAL MEDICINE

## 2022-02-15 PROCEDURE — 82565 ASSAY OF CREATININE: CPT

## 2022-02-15 RX ORDER — BARIUM SULFATE 20 MG/ML
900 SUSPENSION ORAL
Status: COMPLETED | OUTPATIENT
Start: 2022-02-15 | End: 2022-02-15

## 2022-02-15 RX ADMIN — BARIUM SULFATE 900 ML: 21 SUSPENSION ORAL at 06:56

## 2022-02-15 RX ADMIN — IOPAMIDOL 100 ML: 755 INJECTION, SOLUTION INTRAVENOUS at 06:56

## 2022-02-18 ENCOUNTER — OFFICE VISIT (OUTPATIENT)
Dept: CARDIOLOGY CLINIC | Age: 77
End: 2022-02-18
Payer: MEDICARE

## 2022-02-18 VITALS
WEIGHT: 228 LBS | HEIGHT: 68 IN | DIASTOLIC BLOOD PRESSURE: 82 MMHG | HEART RATE: 115 BPM | SYSTOLIC BLOOD PRESSURE: 122 MMHG | BODY MASS INDEX: 34.56 KG/M2 | OXYGEN SATURATION: 100 %

## 2022-02-18 DIAGNOSIS — Z79.01 CHRONIC ANTICOAGULATION: ICD-10-CM

## 2022-02-18 DIAGNOSIS — I48.19 PERSISTENT ATRIAL FIBRILLATION (HCC): Primary | ICD-10-CM

## 2022-02-18 DIAGNOSIS — I10 BENIGN ESSENTIAL HTN: ICD-10-CM

## 2022-02-18 PROCEDURE — 99214 OFFICE O/P EST MOD 30 MIN: CPT | Performed by: NURSE PRACTITIONER

## 2022-02-18 PROCEDURE — G0463 HOSPITAL OUTPT CLINIC VISIT: HCPCS | Performed by: NURSE PRACTITIONER

## 2022-02-18 PROCEDURE — 1101F PT FALLS ASSESS-DOCD LE1/YR: CPT | Performed by: NURSE PRACTITIONER

## 2022-02-18 PROCEDURE — G8754 DIAS BP LESS 90: HCPCS | Performed by: NURSE PRACTITIONER

## 2022-02-18 PROCEDURE — 93010 ELECTROCARDIOGRAM REPORT: CPT | Performed by: NURSE PRACTITIONER

## 2022-02-18 PROCEDURE — G8752 SYS BP LESS 140: HCPCS | Performed by: NURSE PRACTITIONER

## 2022-02-18 PROCEDURE — G8432 DEP SCR NOT DOC, RNG: HCPCS | Performed by: NURSE PRACTITIONER

## 2022-02-18 PROCEDURE — G8417 CALC BMI ABV UP PARAM F/U: HCPCS | Performed by: NURSE PRACTITIONER

## 2022-02-18 PROCEDURE — G8536 NO DOC ELDER MAL SCRN: HCPCS | Performed by: NURSE PRACTITIONER

## 2022-02-18 PROCEDURE — G8427 DOCREV CUR MEDS BY ELIG CLIN: HCPCS | Performed by: NURSE PRACTITIONER

## 2022-02-18 PROCEDURE — 93005 ELECTROCARDIOGRAM TRACING: CPT | Performed by: NURSE PRACTITIONER

## 2022-02-18 RX ORDER — DIGOXIN 125 MCG
0.12 TABLET ORAL EVERY OTHER DAY
Qty: 45 TABLET | Refills: 1 | Status: SHIPPED | OUTPATIENT
Start: 2022-02-18 | End: 2022-04-22

## 2022-03-11 ENCOUNTER — ANCILLARY PROCEDURE (OUTPATIENT)
Dept: CARDIOLOGY CLINIC | Age: 77
End: 2022-03-11
Payer: MEDICARE

## 2022-03-11 VITALS
BODY MASS INDEX: 34.56 KG/M2 | HEIGHT: 68 IN | DIASTOLIC BLOOD PRESSURE: 82 MMHG | WEIGHT: 228 LBS | SYSTOLIC BLOOD PRESSURE: 122 MMHG

## 2022-03-11 DIAGNOSIS — I48.91 ATRIAL FIBRILLATION, UNSPECIFIED TYPE (HCC): ICD-10-CM

## 2022-03-11 PROCEDURE — 93306 TTE W/DOPPLER COMPLETE: CPT | Performed by: INTERNAL MEDICINE

## 2022-03-12 LAB
ECHO AO ASC DIAM: 4.1 CM
ECHO AO ASCENDING AORTA INDEX: 1.9 CM/M2
ECHO AO ROOT DIAM: 4 CM
ECHO AO ROOT INDEX: 1.85 CM/M2
ECHO AV PEAK GRADIENT: 3 MMHG
ECHO AV PEAK VELOCITY: 0.9 M/S
ECHO AV VELOCITY RATIO: 0.78
ECHO EST RA PRESSURE: 3 MMHG
ECHO LA DIAMETER INDEX: 1.85 CM/M2
ECHO LA DIAMETER: 4 CM
ECHO LA TO AORTIC ROOT RATIO: 1
ECHO LA VOL 2C: 68 ML (ref 18–58)
ECHO LA VOL 4C: 98 ML (ref 18–58)
ECHO LA VOL BP: 94 ML (ref 18–58)
ECHO LA VOL/BSA BIPLANE: 44 ML/M2 (ref 16–34)
ECHO LA VOLUME AREA LENGTH: 96 ML
ECHO LA VOLUME INDEX A2C: 31 ML/M2 (ref 16–34)
ECHO LA VOLUME INDEX A4C: 45 ML/M2 (ref 16–34)
ECHO LA VOLUME INDEX AREA LENGTH: 44 ML/M2 (ref 16–34)
ECHO LV FRACTIONAL SHORTENING: 21 % (ref 28–44)
ECHO LV INTERNAL DIMENSION DIASTOLE INDEX: 1.76 CM/M2
ECHO LV INTERNAL DIMENSION DIASTOLIC: 3.8 CM (ref 4.2–5.9)
ECHO LV INTERNAL DIMENSION SYSTOLIC INDEX: 1.39 CM/M2
ECHO LV INTERNAL DIMENSION SYSTOLIC: 3 CM
ECHO LV ISOVOLUMETRIC RELAXATION TIME (IVRT): 64.7 MS
ECHO LV IVSD: 1.2 CM (ref 0.6–1)
ECHO LV MASS 2D: 153.2 G (ref 88–224)
ECHO LV MASS INDEX 2D: 70.9 G/M2 (ref 49–115)
ECHO LV POSTERIOR WALL DIASTOLIC: 1.2 CM (ref 0.6–1)
ECHO LV RELATIVE WALL THICKNESS RATIO: 0.63
ECHO LVOT PEAK GRADIENT: 2 MMHG
ECHO LVOT PEAK VELOCITY: 0.7 M/S
ECHO RIGHT VENTRICULAR SYSTOLIC PRESSURE (RVSP): 35 MMHG
ECHO TV REGURGITANT MAX VELOCITY: 2.84 M/S
ECHO TV REGURGITANT PEAK GRADIENT: 32 MMHG

## 2022-03-13 PROCEDURE — 93306 TTE W/DOPPLER COMPLETE: CPT | Performed by: INTERNAL MEDICINE

## 2022-03-15 ENCOUNTER — TELEPHONE (OUTPATIENT)
Dept: CARDIOLOGY CLINIC | Age: 77
End: 2022-03-15

## 2022-03-18 PROBLEM — C18.9 COLON CANCER (HCC): Status: ACTIVE | Noted: 2021-03-26

## 2022-03-18 NOTE — PROGRESS NOTES
Cardiac Electrophysiology OFFICE Note     Subjective:      Gisselle Marquez is a 64 y.o. patient who presents for follow up of persistent AF with RVR. Holter in 08/2021 showed 100% AF with RVR (avg rate 138 bpm, max 160 bpm & min 127 bpm). Occasional PVCs also noted. Subsequently started meds for rate control, has titrated up. Continues Atenolol 100 mg po bid & low dose digoxin. Medication dosage limited by low BP. States symptoms significantly improved with increase in atenolol, but now developing SOB at times again despite slight increase in digoxin. Rates 100s today. Dr. Deyanira Martins had recommended AV node ablation & pacemaker, but patient had declined thus far. BP controlled. Anticoagulated with Eliquis. Denies bleeding issues. Previous:  Lower extremity edema with diltiazem. Primary cardiologist is Dr. Cori Shen a cath right chest.    S/p colectomy in 03/2021, followed by Dr. Simón Krishnan at [address] [place] [address]. Completed chemo. Patient Active Problem List   Diagnosis Code    [facility] I48.91    Paroxysmal A-fib (HCC) I48.0    Colon cancer (HCC) C18.9     Current Outpatient Medications   Medication Sig Dispense Refill    digoxin (LANOXIN) 0.125 mg tablet Take 1 Tablet by mouth every other day. 45 Tablet 1    atenoloL (TENORMIN) 100 mg tablet Take 1 Tablet by mouth two (2) times a day.  180 Tablet 1    apixaban (Eliquis) 5 mg tablet TAKE 1 TABLET TWICE A  Tab 3     No Known Allergies  Past Medical History:   Diagnosis Date    Arrhythmia     a-fib    Cancer ([facility] 03/2021    Colon    Essential hypertension      Past Surgical History:   Procedure Laterality Date    COLONOSCOPY N/A 1/26/2021    COL[facility]y Low [facility] ENDOSCOPY    COLONOSCOPY N/A 12/15/2021    COLONOSCOPY [facility] by [facility]elsy Low [facility] at [facility]  12/15/2021         Mag Maldonado  1/26/2021         COLONOSCPY,FLEX,W/ [address] INJECT  1/26/2021  HX TONSILLECTOMY  as a child     Family History   Problem Relation Age of Onset    Coronary Art Dis Paternal Uncle     Dementia Mother     Heart Disease Father     Dementia Father      Social History     Tobacco Use    Smoking status: Never Smoker    Smokeless tobacco: Never Used   Substance Use Topics    Alcohol use: Not Currently     Comment: >5yrs        Review of Systems:   Constitutional: Negative for fever, chills, weight loss, + malaise/fatigue. HEENT: Negative for nosebleeds, vision changes. Respiratory: Negative for cough, hemoptysis  Cardiovascular: Negative for chest pain, orthopnea, claudication, leg swelling, syncope, and PND. + SOB  Gastrointestinal: Negative for nausea, vomiting, diarrhea, blood in stool and melena. Genitourinary: Negative for dysuria, and hematuria. Musculoskeletal: Negative for myalgias, arthralgia. Skin: Negative for rash. Heme: Does not bleed or bruise easily. Neurological: Negative for speech change and focal weakness. Objective:     Visit Vitals  /82   Pulse (!) 108   Ht 5' 8\" (1.727 m)   Wt 216 lb (98 kg)   SpO2 98%   BMI 32.84 kg/m²        Physical Exam:   Constitutional: Well-developed and well-nourished. No respiratory distress. Head: Normocephalic and atraumatic. Eyes: Pupils are equal, round. ENT: Hearing grossly normal.  Neck: Supple. No JVD present. Cardiovascular: Borderline tachy rate, irregular rhythm. Exam reveals no gallop and no friction rub. No murmur heard. Pulmonary/Chest: Effort normal and breath sounds normal. No wheezes. Right chest port a cath. Abdominal: Soft, obese. Musculoskeletal: Moves extremities independently. Normal gait. Vasc/lymphatic: No leg edema. Neurological: Alert,oriented. Skin: Skin is warm and dry. Right chest portacath well healed. Psychiatric: Normal mood and affect.  Behavior is normal. Judgment and thought content normal.         Assessment/Plan:     Imaging/Studies:  Echo (03/11/2022): LVEF 55-60%, mild LVH. LA mod dilated, RA mildly dilated. Mild MR with centrally directed jet. Mild AR. Mild to mod TR. Mild FL. Mildly dilated aortic root, mildly dilated ascending aorta. Holter (11/2021): HR  bpm, avg 124 bpm.  100% AF burden, rare PVCs. Holter (08/2021): -160 bpm, avg 136 bpm.  100% AF burden. Rare PVCs. ICD-10-CM ICD-9-CM    1. Chronic a-fib (HCC)  I48.20 427.31    2. Chronic anticoagulation  Z79.01 V58.61         Persistent AF:  Noted via multiple holter monitors, now on digoxin & atenolol. Some RVR. Last known sinus rhythm was in 03/2021. Dr. Yesica Saunders had reviewed risks/benefits of AV node ablation & pacemaker previously. Patient didn't like idea of leadless pacemaker, was more amenable to transvenous pacer, but still concerned regarding pacer dependency. Now with suboptimal control on current regimen. He reports low HR with daily digoxin. Increased digoxin slightly to every other day at last visit. Echo in 03/2022 showed mod LA dilation, mild RA dilation with mild MR & mild TR.  LVEF is WNL. Reviewed risks/benefits of AV node ablation with pacemaker vs AF ablation. He would like to proceed with scheduling AV node ablation & transvenous pacemaker. Anticoagulation: Continue Eliquis for embolic CVA prophylaxis. Denies bleeding issues. No future appointments. Thank you for involving me in this patient's care and please call with further concerns or questions.     AMILCAR Balbuena  [facility] [place]  03/23/22

## 2022-03-23 ENCOUNTER — OFFICE VISIT (OUTPATIENT)
Dept: CARDIOLOGY CLINIC | Age: 77
End: 2022-03-23
Payer: MEDICARE

## 2022-03-23 VITALS
SYSTOLIC BLOOD PRESSURE: 132 MMHG | OXYGEN SATURATION: 98 % | HEART RATE: 108 BPM | BODY MASS INDEX: 32.74 KG/M2 | DIASTOLIC BLOOD PRESSURE: 82 MMHG | HEIGHT: 68 IN | WEIGHT: 216 LBS

## 2022-03-23 DIAGNOSIS — Z79.01 CHRONIC ANTICOAGULATION: ICD-10-CM

## 2022-03-23 DIAGNOSIS — I48.20 CHRONIC A-FIB (HCC): Primary | ICD-10-CM

## 2022-03-23 PROCEDURE — G8752 SYS BP LESS 140: HCPCS | Performed by: NURSE PRACTITIONER

## 2022-03-23 PROCEDURE — G8427 DOCREV CUR MEDS BY ELIG CLIN: HCPCS | Performed by: NURSE PRACTITIONER

## 2022-03-23 PROCEDURE — G8536 NO DOC ELDER MAL SCRN: HCPCS | Performed by: NURSE PRACTITIONER

## 2022-03-23 PROCEDURE — G8432 DEP SCR NOT DOC, RNG: HCPCS | Performed by: NURSE PRACTITIONER

## 2022-03-23 PROCEDURE — G0463 HOSPITAL OUTPT CLINIC VISIT: HCPCS | Performed by: NURSE PRACTITIONER

## 2022-03-23 PROCEDURE — 99214 OFFICE O/P EST MOD 30 MIN: CPT | Performed by: NURSE PRACTITIONER

## 2022-03-23 PROCEDURE — 1101F PT FALLS ASSESS-DOCD LE1/YR: CPT | Performed by: NURSE PRACTITIONER

## 2022-03-23 PROCEDURE — G8754 DIAS BP LESS 90: HCPCS | Performed by: NURSE PRACTITIONER

## 2022-03-23 PROCEDURE — G8417 CALC BMI ABV UP PARAM F/U: HCPCS | Performed by: NURSE PRACTITIONER

## 2022-03-23 RX ORDER — ATENOLOL 50 MG/1
100 TABLET ORAL 2 TIMES DAILY
Qty: 360 TABLET | Refills: 2 | Status: SHIPPED | OUTPATIENT
Start: 2022-03-23 | End: 2022-04-19 | Stop reason: SDUPTHER

## 2022-03-23 RX ORDER — CHLORHEXIDINE GLUCONATE 4 G/100ML
SOLUTION TOPICAL
Qty: 1 EACH | Refills: 0 | Status: SHIPPED | OUTPATIENT
Start: 2022-03-23 | End: 2022-06-03

## 2022-03-23 NOTE — PATIENT INSTRUCTIONS
Your AV node ablation and single lead pacemaker placement procedure has been scheduled for 6/3/22 at 8:00 AM, at [facility].    Please report to Admitting Department by 6:15 AM, or 2 hours prior to your scheduled procedure. Please bring a list of your current medications and medication bottles, if able, to the hospital on this day. You will be unable to drive after your procedure so please make sure to bring someone with you to your procedure. You will need to have nothing to eat or drink after midnight, the night prior to your procedure. You may have small sips of water, if needed, to take with your medication. You should stop taking Eliquis two days prior to your scheduled procedure. You will also need to see Dr. Lizbeth Miller Nurse Practitioner, Tobin Jay, in office prior to your procedure. An appointment has been scheduled for 5/17/22 at 2:30 PM.    Sunil Engle will need labs drawn prior to your procedure. Please go to have this done no sooner than 5/9/22 and no later than 6/2/22. For your convenience, you may have this done when you come in for your pre-procedure visit with Tobin Jay NP. After your procedure, you will need to follow up with Dr. Lizbeth Miller nurse for a wound and device check. Your follow-up appointment has been scheduled for 6/17/22 at 9:00 AM.     Hibiclens 4% topical solution has been ordered and sent into your pharmacy  Patient it start Hibiclens application 5 days prior to procedure date    Directions Hibiclens 4%: Start cleanse 5 days prior to procedure   1. Rinse area (upper chest and upper arms) with water. 2. Apply minimum amount necessary to scrub the upper chest area from shoulder/neck to mid line of chest and to below the nipple each of  5 nights before the day of the procedure  3. Let solution dry. COVID testing 3-4 days prior to procedure.   [facility]:  [facility] location: Patient discharge area at the corner of [address] [place] [address] [address]: [facility], [facility] [address]  Hours: Monday-Friday 7 am to 3 pm

## 2022-04-15 DIAGNOSIS — I48.20 CHRONIC A-FIB (HCC): Primary | ICD-10-CM

## 2022-04-18 NOTE — TELEPHONE ENCOUNTER
Per VO by MD.    Future Appointments   Date Time Provider Zoe Klarissa   5/17/2022  2:30 PM KEYA Peterson AMB   6/17/2022  9:00 AM WOUND CHECKS, SABRINA MARQUEZ AMB   6/17/2022  9:00 AM PACEMAKER3, SABRINA MARQUEZ AMB

## 2022-04-19 RX ORDER — ATENOLOL 50 MG/1
100 TABLET ORAL 2 TIMES DAILY
Qty: 360 TABLET | Refills: 1 | Status: ON HOLD | OUTPATIENT
Start: 2022-04-19 | End: 2022-06-03 | Stop reason: SDUPTHER

## 2022-04-19 NOTE — TELEPHONE ENCOUNTER
Request for Atenolol 50 mg take 2 tabs BID to [facility]. Last office visit 3/23/22, next office visit 5/17/22. Refills per verbal order from Dr. Edson Sierra.

## 2022-04-21 DIAGNOSIS — I48.20 CHRONIC A-FIB (HCC): Primary | ICD-10-CM

## 2022-04-22 RX ORDER — DIGOXIN 125 MCG
0.12 TABLET ORAL EVERY OTHER DAY
Qty: 45 TABLET | Refills: 3 | Status: SHIPPED | OUTPATIENT
Start: 2022-04-22 | End: 2022-06-03

## 2022-04-22 NOTE — TELEPHONE ENCOUNTER
Per VO by MD.    Future Appointments   Date Time Provider Zoe Klarissa   5/17/2022  2:30 PM Oliver Nieves B, KEYA MARQUEZ AMB   6/17/2022  9:00 AM WOUND CHECKS, SABRINA MARQUEZ AMB   6/17/2022  9:00 AM PACEMAKER3, SABRINA MARQUEZ AMB

## 2022-05-14 NOTE — PROGRESS NOTES
Cardiac Electrophysiology OFFICE Note     Subjective:      Ruth Ballard is a 64 y.o. patient who presents for H&P update prior to upcoming planned AV node ablation & single lead pacemaker (scheduled for 06/03/2022). Holter in 08/2021 showed 100% AF with RVR (avg rate 138 bpm, max 160 bpm & min 127 bpm). Occasional PVCs also noted. Subsequently started meds for rate control, has titrated up. Continues Atenolol 100 mg po bid & low dose digoxin. Medication dosage limited by low BP. Symptoms significantly improved with increase in atenolol, but now developing SOB at times again despite slight increase in digoxin. BP controlled. Anticoagulated with Eliquis. Denies bleeding issues. Previous:  Lower extremity edema with diltiazem. Primary cardiologist is Dr. Farris Faster a cath right chest.    S/p colectomy in 03/2021, followed by Dr. Sherri Armstrong at [facility]. Completed chemo. Patient Active Problem List   Diagnosis Code    Afib ([facility] I48.91    Paroxysmal A-fib (HCC) I48.0    Colon cancer (HCC) C18.9     Current Outpatient Medications   Medication Sig Dispense Refill    digoxin (LANOXIN) 0.125 mg tablet Take 1 Tablet by mouth every other day. 45 Tablet 3    atenoloL (TENORMIN) 50 mg tablet Take 2 Tablets by mouth two (2) times a day. 360 Tablet 1    apixaban (Eliquis) 5 mg tablet Take 1 Tablet by mouth every twelve (12) hours. 180 Tablet 3    chlorhexidine (Hibiclens) 4 % liquid Apply to the upper chest area from shoulder/neck to mid line of chest and to below the nipple every day, 5 days prior to the procedure.  1 Each 0     No Known Allergies  Past Medical History:   Diagnosis Date    Arrhythmia     a-fib    Cancer ([facility] 03/2021    Colon    Essential hypertension      Past Surgical History:   Procedure Laterality Date    COLONOSCOPY N/A 1/26/2021    COL[facility] ENDOSCOPY    COLONOSCOPY N/A 12/15/2021    COLONOSCOPY [facility] by Marysol Willams MD CORDELL [facility]  12/15/2021         Nicanor Anu  1/26/2021         COLONOSCPY,FLEX,W/ [address] INJECT  1/26/2021         HX TONSILLECTOMY  as a child     Family History   Problem Relation Age of Onset    Coronary Art Dis Paternal Uncle     Dementia Mother     Heart Disease Father     Dementia Father      Social History     Tobacco Use    Smoking status: Never Smoker    Smokeless tobacco: Never Used   Substance Use Topics    Alcohol use: Not Currently     Comment: >5yrs        Review of Systems:   Constitutional: Negative for fever, chills, weight loss, + malaise/fatigue. HEENT: Negative for nosebleeds, vision changes. Respiratory: Negative for cough, hemoptysis  Cardiovascular: Negative for chest pain, orthopnea, claudication, leg swelling, syncope, and PND. + SOB  Gastrointestinal: Negative for nausea, vomiting, diarrhea, blood in stool and melena. Genitourinary: Negative for dysuria, and hematuria. Musculoskeletal: Negative for myalgias, arthralgia. Skin: Negative for rash. Heme: Does not bleed or bruise easily. Neurological: Negative for speech change and focal weakness. + neuropathy improved     Objective:     Visit Vitals  /82 (BP 1 Location: Right arm, BP Patient Position: Sitting, BP Cuff Size: Adult)   Pulse 100   Resp 14   Ht 5' 8\" (1.727 m)   Wt 215 lb (97.5 kg)   SpO2 97%   BMI 32.69 kg/m²        Physical Exam:   Constitutional: Well-developed and well-nourished. No respiratory distress. Head: Normocephalic and atraumatic. Eyes: Pupils are equal, round. ENT: Hearing grossly normal.  Neck: Supple. No JVD present. Cardiovascular: Borderline tachy rate, irregular rhythm. Exam reveals no gallop and no friction rub. No murmur heard. Pulmonary/Chest: Effort normal and breath sounds normal. No wheezes. Right chest port a cath. Abdominal: Soft, obese. Musculoskeletal: Moves extremities independently.   Normal gait.  Vasc/lymphatic: No leg edema. Neurological: Alert,oriented. Skin: Skin is warm and dry. Right chest portacath well healed. Psychiatric: Normal mood and affect. Behavior is normal. Judgment and thought content normal.         Assessment/Plan:     Imaging/Studies:  Echo (03/11/2022): LVEF 55-60%, mild LVH. LA mod dilated, RA mildly dilated. Mild MR with centrally directed jet. Mild AR. Mild to mod TR. Mild NM. Mildly dilated aortic root, mildly dilated ascending aorta. Holter (11/2021): HR  bpm, avg 124 bpm.  100% AF burden, rare PVCs. Holter (08/2021): -160 bpm, avg 136 bpm.  100% AF burden. Rare PVCs. ICD-10-CM ICD-9-CM    1. Persistent atrial fibrillation (HCC)  I48.19 427.31 CBC WITH AUTOMATED DIFF      METABOLIC PANEL, BASIC   2. Anticoagulated  Z79.01 V58.61 CBC WITH AUTOMATED DIFF      METABOLIC PANEL, BASIC        Persistent AF:  Noted via multiple holter monitors, now on digoxin & atenolol. Some RVR. Last known sinus rhythm was in 03/2021. Patient didn't like idea of leadless pacemaker, was more amenable to transvenous pacer. Now with suboptimal control on current regimen. He reports low HR with daily digoxin. Increased digoxin slightly to every other day earlier this year. Echo in 03/2022 showed mod LA dilation, mild RA dilation with mild MR & mild TR.  LVEF is WNL. Reviewed risks/benefits of AV node ablation with pacemaker vs AF ablation. He would like to proceed with AV node ablation & single lead pacemaker as scheduled. Labs ordered. Reviewed Hibiclens. Anticoagulation: Continue Eliquis for embolic CVA prophylaxis. Denies bleeding issues. Hold x 2 days prior to upcoming procedure.       Future Appointments   Date Time Provider Zoe Cyr   6/17/2022  9:00 AM WOUND CHECKS, SABRINA MORELOS   6/17/2022  9:00 AM PACEMAKER3, SABRINA MORELOS      Thank you for involving me in this patient's care and please call with further concerns or questions.     AMILCAR Ng  [facility] [place]  05/17/22

## 2022-05-14 NOTE — H&P (VIEW-ONLY)
Cardiac Electrophysiology OFFICE Note     Subjective:      Enedelia Bowling is a 64 y.o. patient who presents for H&P update prior to upcoming planned AV node ablation & single lead pacemaker (scheduled for 06/03/2022). Holter in 08/2021 showed 100% AF with RVR (avg rate 138 bpm, max 160 bpm & min 127 bpm). Occasional PVCs also noted. Subsequently started meds for rate control, has titrated up. Continues Atenolol 100 mg po bid & low dose digoxin. Medication dosage limited by low BP. Symptoms significantly improved with increase in atenolol, but now developing SOB at times again despite slight increase in digoxin. BP controlled. Anticoagulated with Eliquis. Denies bleeding issues. Previous:  Lower extremity edema with diltiazem. Primary cardiologist is Dr. Ginna Hess a cath right chest.    S/p colectomy in 03/2021, followed by Dr. Carlos Alcocer at [facility]. Completed chemo. Patient Active Problem List   Diagnosis Code    Afib ([facility] I48.91    Paroxysmal A-fib (HCC) I48.0    Colon cancer (HCC) C18.9     Current Outpatient Medications   Medication Sig Dispense Refill    digoxin (LANOXIN) 0.125 mg tablet Take 1 Tablet by mouth every other day. 45 Tablet 3    atenoloL (TENORMIN) 50 mg tablet Take 2 Tablets by mouth two (2) times a day. 360 Tablet 1    apixaban (Eliquis) 5 mg tablet Take 1 Tablet by mouth every twelve (12) hours. 180 Tablet 3    chlorhexidine (Hibiclens) 4 % liquid Apply to the upper chest area from shoulder/neck to mid line of chest and to below the nipple every day, 5 days prior to the procedure.  1 Each 0     No Known Allergies  Past Medical History:   Diagnosis Date    Arrhythmia     a-fib    Cancer ([facility] 03/2021    Colon    Essential hypertension      Past Surgical History:   Procedure Laterality Date    COLONOSCOPY N/A 1/26/2021    COLON[facility] performed [facility] Arlene Brothers [facility] at [facility] ENDOSCOPY    COLONOSCOPY N/A 12/15/2021    COLONOSCOPY performed by Charles Petersen MD CORDELL [facility]  12/15/2021         Radha Peterson  1/26/2021         COLONOSCPY,FLEX,W/ [address] INJECT  1/26/2021         HX TONSILLECTOMY  as a child     Family History   Problem Relation Age of Onset    Coronary Art Dis Paternal Uncle     Dementia Mother     Heart Disease Father     Dementia Father      Social History     Tobacco Use    Smoking status: Never Smoker    Smokeless tobacco: Never Used   Substance Use Topics    Alcohol use: Not Currently     Comment: >5yrs        Review of Systems:   Constitutional: Negative for fever, chills, weight loss, + malaise/fatigue. HEENT: Negative for nosebleeds, vision changes. Respiratory: Negative for cough, hemoptysis  Cardiovascular: Negative for chest pain, orthopnea, claudication, leg swelling, syncope, and PND. + SOB  Gastrointestinal: Negative for nausea, vomiting, diarrhea, blood in stool and melena. Genitourinary: Negative for dysuria, and hematuria. Musculoskeletal: Negative for myalgias, arthralgia. Skin: Negative for rash. Heme: Does not bleed or bruise easily. Neurological: Negative for speech change and focal weakness. + neuropathy improved     Objective:     Visit Vitals  /82 (BP 1 Location: Right arm, BP Patient Position: Sitting, BP Cuff Size: Adult)   Pulse 100   Resp 14   Ht 5' 8\" (1.727 m)   Wt 215 lb (97.5 kg)   SpO2 97%   BMI 32.69 kg/m²        Physical Exam:   Constitutional: Well-developed and well-nourished. No respiratory distress. Head: Normocephalic and atraumatic. Eyes: Pupils are equal, round. ENT: Hearing grossly normal.  Neck: Supple. No JVD present. Cardiovascular: Borderline tachy rate, irregular rhythm. Exam reveals no gallop and no friction rub. No murmur heard. Pulmonary/Chest: Effort normal and breath sounds normal. No wheezes. Right chest port a cath. Abdominal: Soft, obese. Musculoskeletal: Moves extremities independently.   Normal gait.  Vasc/lymphatic: No leg edema. Neurological: Alert,oriented. Skin: Skin is warm and dry. Right chest portacath well healed. Psychiatric: Normal mood and affect. Behavior is normal. Judgment and thought content normal.         Assessment/Plan:     Imaging/Studies:  Echo (03/11/2022): LVEF 55-60%, mild LVH. LA mod dilated, RA mildly dilated. Mild MR with centrally directed jet. Mild AR. Mild to mod TR. Mild HI. Mildly dilated aortic root, mildly dilated ascending aorta. Holter (11/2021): HR  bpm, avg 124 bpm.  100% AF burden, rare PVCs. Holter (08/2021): -160 bpm, avg 136 bpm.  100% AF burden. Rare PVCs. ICD-10-CM ICD-9-CM    1. Persistent atrial fibrillation (HCC)  I48.19 427.31 CBC WITH AUTOMATED DIFF      METABOLIC PANEL, BASIC   2. Anticoagulated  Z79.01 V58.61 CBC WITH AUTOMATED DIFF      METABOLIC PANEL, BASIC        Persistent AF:  Noted via multiple holter monitors, now on digoxin & atenolol. Some RVR. Last known sinus rhythm was in 03/2021. Patient didn't like idea of leadless pacemaker, was more amenable to transvenous pacer. Now with suboptimal control on current regimen. He reports low HR with daily digoxin. Increased digoxin slightly to every other day earlier this year. Echo in 03/2022 showed mod LA dilation, mild RA dilation with mild MR & mild TR.  LVEF is WNL. Reviewed risks/benefits of AV node ablation with pacemaker vs AF ablation. He would like to proceed with AV node ablation & single lead pacemaker as scheduled. Labs ordered. Reviewed Hibiclens. Anticoagulation: Continue Eliquis for embolic CVA prophylaxis. Denies bleeding issues. Hold x 2 days prior to upcoming procedure.       Future Appointments   Date Time Provider Zoe Cyr   6/17/2022  9:00 AM WOUND CHECKS, SABRINA MORELOS   6/17/2022  9:00 AM PACEMAKER3, SABRINA MORELOS      Thank you for involving me in this patient's care and please call with further concerns or questions.     AMILCAR Purdy  [facility]  05/17/22

## 2022-05-17 ENCOUNTER — OFFICE VISIT (OUTPATIENT)
Dept: CARDIOLOGY CLINIC | Age: 77
End: 2022-05-17
Payer: MEDICARE

## 2022-05-17 VITALS
HEART RATE: 100 BPM | RESPIRATION RATE: 14 BRPM | SYSTOLIC BLOOD PRESSURE: 124 MMHG | OXYGEN SATURATION: 97 % | WEIGHT: 215 LBS | HEIGHT: 68 IN | BODY MASS INDEX: 32.58 KG/M2 | DIASTOLIC BLOOD PRESSURE: 82 MMHG

## 2022-05-17 DIAGNOSIS — I48.19 PERSISTENT ATRIAL FIBRILLATION (HCC): Primary | ICD-10-CM

## 2022-05-17 DIAGNOSIS — Z79.01 ANTICOAGULATED: ICD-10-CM

## 2022-05-17 DIAGNOSIS — I48.19 PERSISTENT ATRIAL FIBRILLATION (HCC): ICD-10-CM

## 2022-05-17 PROCEDURE — G8432 DEP SCR NOT DOC, RNG: HCPCS | Performed by: NURSE PRACTITIONER

## 2022-05-17 PROCEDURE — G0463 HOSPITAL OUTPT CLINIC VISIT: HCPCS | Performed by: NURSE PRACTITIONER

## 2022-05-17 PROCEDURE — G8417 CALC BMI ABV UP PARAM F/U: HCPCS | Performed by: NURSE PRACTITIONER

## 2022-05-17 PROCEDURE — G8427 DOCREV CUR MEDS BY ELIG CLIN: HCPCS | Performed by: NURSE PRACTITIONER

## 2022-05-17 PROCEDURE — G8754 DIAS BP LESS 90: HCPCS | Performed by: NURSE PRACTITIONER

## 2022-05-17 PROCEDURE — G8752 SYS BP LESS 140: HCPCS | Performed by: NURSE PRACTITIONER

## 2022-05-17 PROCEDURE — G8536 NO DOC ELDER MAL SCRN: HCPCS | Performed by: NURSE PRACTITIONER

## 2022-05-17 PROCEDURE — 99214 OFFICE O/P EST MOD 30 MIN: CPT | Performed by: NURSE PRACTITIONER

## 2022-05-17 PROCEDURE — 1101F PT FALLS ASSESS-DOCD LE1/YR: CPT | Performed by: NURSE PRACTITIONER

## 2022-05-18 LAB
ANION GAP SERPL CALC-SCNC: 4 MMOL/L (ref 5–15)
BASOPHILS # BLD: 0 K/UL (ref 0–0.1)
BASOPHILS NFR BLD: 1 % (ref 0–1)
BUN SERPL-MCNC: 20 MG/DL (ref 6–20)
BUN/CREAT SERPL: 24 (ref 12–20)
CALCIUM SERPL-MCNC: 9.5 MG/DL (ref 8.5–10.1)
CHLORIDE SERPL-SCNC: 102 MMOL/L (ref 97–108)
CO2 SERPL-SCNC: 28 MMOL/L (ref 21–32)
CREAT SERPL-MCNC: 0.85 MG/DL (ref 0.7–1.3)
DIFFERENTIAL METHOD BLD: ABNORMAL
EOSINOPHIL # BLD: 0.1 K/UL (ref 0–0.4)
EOSINOPHIL NFR BLD: 1 % (ref 0–7)
ERYTHROCYTE [DISTWIDTH] IN BLOOD BY AUTOMATED COUNT: 15.7 % (ref 11.5–14.5)
GLUCOSE SERPL-MCNC: 100 MG/DL (ref 65–100)
HCT VFR BLD AUTO: 40.2 % (ref 36.6–50.3)
HGB BLD-MCNC: 12.9 G/DL (ref 12.1–17)
IMM GRANULOCYTES # BLD AUTO: 0 K/UL (ref 0–0.04)
IMM GRANULOCYTES NFR BLD AUTO: 0 % (ref 0–0.5)
LYMPHOCYTES # BLD: 1.5 K/UL (ref 0.8–3.5)
LYMPHOCYTES NFR BLD: 26 % (ref 12–49)
MCH RBC QN AUTO: 30.5 PG (ref 26–34)
MCHC RBC AUTO-ENTMCNC: 32.1 G/DL (ref 30–36.5)
MCV RBC AUTO: 95 FL (ref 80–99)
MONOCYTES # BLD: 0.6 K/UL (ref 0–1)
MONOCYTES NFR BLD: 10 % (ref 5–13)
NEUTS SEG # BLD: 3.6 K/UL (ref 1.8–8)
NEUTS SEG NFR BLD: 62 % (ref 32–75)
NRBC # BLD: 0 K/UL (ref 0–0.01)
NRBC BLD-RTO: 0 PER 100 WBC
PLATELET # BLD AUTO: 134 K/UL (ref 150–400)
PMV BLD AUTO: 12.6 FL (ref 8.9–12.9)
POTASSIUM SERPL-SCNC: 4.6 MMOL/L (ref 3.5–5.1)
RBC # BLD AUTO: 4.23 M/UL (ref 4.1–5.7)
SODIUM SERPL-SCNC: 134 MMOL/L (ref 136–145)
WBC # BLD AUTO: 5.8 K/UL (ref 4.1–11.1)

## 2022-05-18 NOTE — PROGRESS NOTES
Labs ordered by Dr. Bernstein already include kidney function and electrolytes so I do not have to add BMP however we can place an order for vitamin D to be drawn on 8/20.   Unfortunately I am unable to repeat A1c before 3 months from last.    In terms of new medication okay to send a prescription for Jardiance 25 mg by mouth daily.   Please education patient/wife that this medication lowers blood glucose readings by increasing urinary excretion of glucose so hydration is of paramount importance to keep up with the fluid losses.  It might cause some indigestion in the first 7-10 days but the side effects usually resolves  as the body gets used to the new medication however if it does not then we can decrease the dose.    Also with the Jardiance help in lowering his blood glucose readings, we might be able to cut back further on metformin to relieve the patient from some GI side effects.     Mild thrombocytopenia, mild hyponatremia. OK to proceed with upcoming procedure as scheduled.     Future Appointments  6/17/2022  9:00 AM    WOUND CHECKS, SABRINA MARQUEZ AMB  6/17/2022  9:00 AM    PACEMAKER3, SABRINA MARQUEZ AMB

## 2022-05-26 RX ORDER — SODIUM CHLORIDE 0.9 % (FLUSH) 0.9 %
5-40 SYRINGE (ML) INJECTION EVERY 8 HOURS
Status: CANCELLED | OUTPATIENT
Start: 2022-05-26

## 2022-05-26 RX ORDER — SODIUM CHLORIDE 0.9 % (FLUSH) 0.9 %
5-40 SYRINGE (ML) INJECTION AS NEEDED
Status: CANCELLED | OUTPATIENT
Start: 2022-05-26

## 2022-06-02 ENCOUNTER — TELEPHONE (OUTPATIENT)
Dept: CARDIOLOGY CLINIC | Age: 77
End: 2022-06-02

## 2022-06-02 NOTE — TELEPHONE ENCOUNTER
Verified patient with two types of identifiers. Reviewed date and time of procedure tomorrow. He will arrive by 615 tomorrow morning to the admitting department. NPO after midnight tonight. He has been holding his Eliquis. Patient verbalized understanding and will call with any other questions.       Future Appointments   Date Time Provider Zoe Cyr   6/17/2022  9:00 AM WOUND CHECKS, SABRINA MORELOS   6/17/2022  9:00 AM PACEMAKER3, SABRINA MARQUEZ AMB

## 2022-06-03 ENCOUNTER — APPOINTMENT (OUTPATIENT)
Dept: GENERAL RADIOLOGY | Age: 77
End: 2022-06-03
Attending: NURSE PRACTITIONER
Payer: MEDICARE

## 2022-06-03 ENCOUNTER — HOSPITAL ENCOUNTER (OUTPATIENT)
Age: 77
Setting detail: OUTPATIENT SURGERY
Discharge: HOME | End: 2022-06-03
Attending: INTERNAL MEDICINE | Admitting: INTERNAL MEDICINE
Payer: MEDICARE

## 2022-06-03 VITALS
OXYGEN SATURATION: 98 % | TEMPERATURE: 98.4 F | BODY MASS INDEX: 31.7 KG/M2 | SYSTOLIC BLOOD PRESSURE: 116 MMHG | RESPIRATION RATE: 17 BRPM | DIASTOLIC BLOOD PRESSURE: 84 MMHG | HEART RATE: 80 BPM | WEIGHT: 214 LBS | HEIGHT: 69 IN

## 2022-06-03 DIAGNOSIS — I97.190 COMPLETE AV BLOCK DUE TO AV NODAL ABLATION (HCC): ICD-10-CM

## 2022-06-03 DIAGNOSIS — Z95.0 PACEMAKER: ICD-10-CM

## 2022-06-03 DIAGNOSIS — I48.20 ATRIAL FIBRILLATION, CHRONIC (HCC): ICD-10-CM

## 2022-06-03 DIAGNOSIS — I44.2 COMPLETE AV BLOCK DUE TO AV NODAL ABLATION (HCC): ICD-10-CM

## 2022-06-03 PROCEDURE — 2709999900 HC NON-CHARGEABLE SUPPLY: Performed by: INTERNAL MEDICINE

## 2022-06-03 PROCEDURE — C1786 PMKR, SINGLE, RATE-RESP: HCPCS | Performed by: INTERNAL MEDICINE

## 2022-06-03 PROCEDURE — 93650 ICAR CATH ABLTJ AV NODE FUNC: CPT | Performed by: INTERNAL MEDICINE

## 2022-06-03 PROCEDURE — 77030018547 HC SUT ETHBND1 J&J -B: Performed by: INTERNAL MEDICINE

## 2022-06-03 PROCEDURE — 77030032060 HC PWDR HEMSTAT ARISTA ASRB 3GM BARD -C: Performed by: INTERNAL MEDICINE

## 2022-06-03 PROCEDURE — C1898 LEAD, PMKR, OTHER THAN TRANS: HCPCS | Performed by: INTERNAL MEDICINE

## 2022-06-03 PROCEDURE — C1733 CATH, EP, OTHR THAN COOL-TIP: HCPCS | Performed by: INTERNAL MEDICINE

## 2022-06-03 PROCEDURE — 99152 MOD SED SAME PHYS/QHP 5/>YRS: CPT | Performed by: INTERNAL MEDICINE

## 2022-06-03 PROCEDURE — 77030003390 HC NDL ANGI MRTM -A: Performed by: INTERNAL MEDICINE

## 2022-06-03 PROCEDURE — C1894 INTRO/SHEATH, NON-LASER: HCPCS | Performed by: INTERNAL MEDICINE

## 2022-06-03 PROCEDURE — C1893 INTRO/SHEATH, FIXED,NON-PEEL: HCPCS | Performed by: INTERNAL MEDICINE

## 2022-06-03 PROCEDURE — 71045 X-RAY EXAM CHEST 1 VIEW: CPT

## 2022-06-03 PROCEDURE — 33207 INSERT HEART PM VENTRICULAR: CPT | Performed by: INTERNAL MEDICINE

## 2022-06-03 PROCEDURE — 77030022704 HC SUT VLOC COVD -B: Performed by: INTERNAL MEDICINE

## 2022-06-03 PROCEDURE — C1892 INTRO/SHEATH,FIXED,PEEL-AWAY: HCPCS | Performed by: INTERNAL MEDICINE

## 2022-06-03 PROCEDURE — 99153 MOD SED SAME PHYS/QHP EA: CPT | Performed by: INTERNAL MEDICINE

## 2022-06-03 PROCEDURE — 77030018729 HC ELECTRD DEFIB PAD CARD -B: Performed by: INTERNAL MEDICINE

## 2022-06-03 PROCEDURE — 93600 BUNDLE OF HIS RECORDING: CPT | Performed by: INTERNAL MEDICINE

## 2022-06-03 PROCEDURE — 77030010872 HC CBL EP BSC -C: Performed by: INTERNAL MEDICINE

## 2022-06-03 PROCEDURE — C1781 MESH (IMPLANTABLE): HCPCS | Performed by: INTERNAL MEDICINE

## 2022-06-03 PROCEDURE — 77030041075 HC DRSG AG OPTIFRM MDII -B: Performed by: INTERNAL MEDICINE

## 2022-06-03 PROCEDURE — C1760 CLOSURE DEV, VASC: HCPCS | Performed by: INTERNAL MEDICINE

## 2022-06-03 PROCEDURE — 74011000250 HC RX REV CODE- 250: Performed by: INTERNAL MEDICINE

## 2022-06-03 PROCEDURE — 74011000636 HC RX REV CODE- 636: Performed by: INTERNAL MEDICINE

## 2022-06-03 PROCEDURE — 74011250636 HC RX REV CODE- 250/636: Performed by: INTERNAL MEDICINE

## 2022-06-03 DEVICE — ENVELOPE CMRM6133 ABSORB LRG MR
Type: IMPLANTABLE DEVICE | Status: FUNCTIONAL
Brand: TYRX™

## 2022-06-03 DEVICE — PACEMAKER CRD UPLR/BPLR 50.8X42.6X7.4 MM 12.25 CC 22.5 GM: Type: IMPLANTABLE DEVICE | Status: FUNCTIONAL

## 2022-06-03 DEVICE — LEAD 5076-58 MRI US RCMCRD
Type: IMPLANTABLE DEVICE | Status: FUNCTIONAL
Brand: CAPSUREFIX NOVUS MRI™ SURESCAN®

## 2022-06-03 RX ORDER — SODIUM CHLORIDE 0.9 % (FLUSH) 0.9 %
5-40 SYRINGE (ML) INJECTION AS NEEDED
Status: DISCONTINUED | OUTPATIENT
Start: 2022-06-03 | End: 2022-06-03 | Stop reason: HOSPADM

## 2022-06-03 RX ORDER — FENTANYL CITRATE 50 UG/ML
INJECTION, SOLUTION INTRAMUSCULAR; INTRAVENOUS AS NEEDED
Status: DISCONTINUED | OUTPATIENT
Start: 2022-06-03 | End: 2022-06-03 | Stop reason: HOSPADM

## 2022-06-03 RX ORDER — MIDAZOLAM HYDROCHLORIDE 1 MG/ML
INJECTION, SOLUTION INTRAMUSCULAR; INTRAVENOUS AS NEEDED
Status: DISCONTINUED | OUTPATIENT
Start: 2022-06-03 | End: 2022-06-03 | Stop reason: HOSPADM

## 2022-06-03 RX ORDER — ACETAMINOPHEN 325 MG/1
650 TABLET ORAL
Status: DISCONTINUED | OUTPATIENT
Start: 2022-06-03 | End: 2022-06-03 | Stop reason: HOSPADM

## 2022-06-03 RX ORDER — SODIUM CHLORIDE 0.9 % (FLUSH) 0.9 %
5-40 SYRINGE (ML) INJECTION EVERY 8 HOURS
Status: DISCONTINUED | OUTPATIENT
Start: 2022-06-03 | End: 2022-06-03 | Stop reason: HOSPADM

## 2022-06-03 RX ORDER — ONDANSETRON 2 MG/ML
4 INJECTION INTRAMUSCULAR; INTRAVENOUS
Status: DISCONTINUED | OUTPATIENT
Start: 2022-06-03 | End: 2022-06-03 | Stop reason: HOSPADM

## 2022-06-03 RX ORDER — HYDROCODONE BITARTRATE AND ACETAMINOPHEN 5; 325 MG/1; MG/1
1 TABLET ORAL
Status: DISCONTINUED | OUTPATIENT
Start: 2022-06-03 | End: 2022-06-03 | Stop reason: HOSPADM

## 2022-06-03 RX ORDER — ATENOLOL 50 MG/1
100 TABLET ORAL DAILY
Qty: 360 TABLET | Refills: 1 | Status: SHIPPED
Start: 2022-06-03

## 2022-06-03 RX ORDER — LIDOCAINE HYDROCHLORIDE 10 MG/ML
INJECTION INFILTRATION; PERINEURAL AS NEEDED
Status: DISCONTINUED | OUTPATIENT
Start: 2022-06-03 | End: 2022-06-03 | Stop reason: HOSPADM

## 2022-06-03 NOTE — PROGRESS NOTES
TRANSFER - IN REPORT:    Verbal report received from [facility] on Omi Cai  being received from Electrophysiology Lab  for routine progression of care. Report consisted of patients Situation, Background, Assessment and Recommendations(SBAR). Information from the following report(s) Kardex and Procedure Summary was reviewed with the receiving clinician. Opportunity for questions and clarification was provided. Assessment completed upon patients arrival to [address] [place] [address] and care assumed. Cardiac Cath Lab Recovery Arrival Note:    Govind Valenzuela arrived to [facility] recovery area. Patient procedure= Ablation and PPI. Patient on cardiac monitor, non-invasive blood pressure, SPO2 monitor. On  O2 @ 2 lpm via NC.  IV  of NS on pump at 25 ml/hr. Patient status doing well without problems. Patient is A&Ox 3. Patient reports no pain. PROCEDURE SITE CHECK:    Procedure site:without any bleeding and no hematoma, No pain/discomfort reported at procedure site. No change in patient status. Continue to monitor patient and status.

## 2022-06-03 NOTE — PROGRESS NOTES
Patient discharged via wheelchair with discharge instructions . Wife was instructed about the medtronic device also.

## 2022-06-03 NOTE — DISCHARGE INSTRUCTIONS
PATIENT INSTRUCTIONS POST-PACEMAKER IMPLANT & AV NODE ABLATION    Stop digoxin   Reduce atenolol to 2 tabs daily (monitor daily blood pressure for 1 week. Goal is 130/80)  Do not take more atenolol today since you already took this morning    1. No heavy lifting or exercises with the left arm for 4 weeks. This includes the following:  Do not raise arm above the shoulder level to comb hair, pull on clothes, etc... Do not use the affected arm to pull up or push up from a seated or laying  down position. Do not allow anyone else to pull on the affected arm. 2.  You may shower with your Aquacel chest dressing in place. You may remove your chest dressing in 1 week, or it can be removed in clinic at follow up if you prefer. 3.  Do not drive for 3 days    4. Call Dr. Kathrin Sawant at [phone] if you experience any of the following symptoms:  1. Redness at the pacemaker site or groin  2. Swelling at or around the pacemaker or in the left arm or groin  3. Pain around the pacemaker or groin  4. Dizziness, lightheadedness, fainting spells  5. Lack of energy  6. Shortness of breath  7. Rapid heart rate  8. Chest or muscle twitches    5. Follow-up with Dr. Radha Grey office as scheduled below. Future Appointments   Date Time Provider Zoe Cyr   6/17/2022  9:00 AM WOUND CHECKS, SABRINA MARQUEZ AMB   6/17/2022  9:00 AM PACEMAKER3, SABRINA MARQUEZ AMB     6. You may use over the counter pain medication (Tylenol) and ice pack for pain relief as needed. You may wear the sling as a reminder to keep your arm below the your shoulder. Kevin Rivero M.D.  [facility]  Electrophysiology/Cardiology  [facility]  [address], [place] [address], [facility] [address]  [address] [facility], [address] [place] [address]  [phone]

## 2022-06-03 NOTE — ROUTINE PROCESS
Cardiac Cath Lab Recovery Arrival Note:      Ambrosio Angulo arrived to Cardiac Cath Lab, Recovery Area. Staff introduced to patient. Patient identifiers verified with NAME and DATE OF BIRTH. Procedure verified with patient. Consent forms reviewed and signed by patient or authorized representative and verified. Allergies verified. Patient and family oriented to department. Patient and family informed of procedure and plan of care. Questions answered with review. Patient prepped for procedure, per orders from physician, prior to arrival.    Patient on cardiac monitor, non-invasive blood pressure, SPO2 monitor. On RA. Patient is A&Ox 4. Patient reports no pain. Patient in stretcher, in low position, with side rails up, call bell within reach, patient instructed to call if assistance as needed. Patient prep in: [address], [place] [address]. Patient family has pager #   Family in: . Prep by: LONNIE Car RN

## 2022-06-03 NOTE — Clinical Note
Bilateral groin and left chest prepped with ChloraPrep and draped. Wet prep solution applied at: 853. Wet prep solution dried at: 856. Wet prep elapsed drying time: 3 mins.

## 2022-06-03 NOTE — Clinical Note
TRANSFER - OUT REPORT:    Verbal report given to [facility] on Jan F Ayala being transferred to  for routine progression of care       Report consisted of patients Situation, Background, Assessment and   Recommendations(SBAR). Information from the following report(s) SBAR, Procedure Summary and MAR was reviewed with the receiving nurse. Opportunity for questions and clarification was provided.

## 2022-06-03 NOTE — INTERVAL H&P NOTE
Update History & Physical    The Patient's History and Physical of May 17,   2022 was reviewed with the patient and I examined the patient. There was no change. The surgical site was confirmed by the patient and me. Plan:  The risk, benefits, expected outcome, and alternative to the recommended procedure have been discussed with the patient. Patient understands and wants to proceed with the procedure.     Electronically signed by Christiano Watson MD on 6/3/2022 at 7:12 AM

## 2022-06-03 NOTE — Clinical Note
Cardiac Cath Lab Procedure Area Arrival Note:    Alon Henson arrived to Cardiac Cath Lab, Procedure Area. Patient identifiers verified with NAME and DATE OF BIRTH. Procedure verified with patient. Consent forms verified. Allergies verified. Patient informed of procedure and plan of care. Questions answered with review. Patient voiced understanding of procedure and plan of care. Patient on cardiac monitor, non-invasive blood pressure, SPO2 monitor. On RA to O2 @ 2 lpm via NC.  IV of  ml on pump at kvo ml/hr. Patient status doing well without problems. Patient is A&Ox 4. Patient reports no complaints. Patient medicated during procedure with orders obtained and verified by Dr. Zachary Hendrickson. Refer to patients Cardiac Cath Lab PROCEDURE REPORT for vital signs, assessment, status, and response during procedure, printed at end of case. Printed report on chart or scanned into chart.

## 2022-06-03 NOTE — PROCEDURES
Cardiac Procedure Note   Patient: Ambrosio Angulo  MRN: [id number]  SSN: [id number]   YOB: 1945 Age: 64 y.o.   Sex: male    Date of Procedure: 6/3/2022   Pre-procedure Diagnosis: persistent atrial fibrillation with rapid ventricular rate despite medication, dizziness  Post-procedure Diagnosis: same  Procedure: Permanent Pacemaker Insertion and Ablation of av node  :  Dr. Pee Garcia MD    Assistant(s):  None  Anesthesia: Moderate Sedation   Estimated Blood Loss: Less than 10 mL   Specimens Removed: None  Findings: RV basal septal lead  (he did not want leadless pacer)  HV 45 ms  Ablation with 4 mm tip to junctional rhythm 30 and then RV pacing  VVIR  bpm  Complications: None   Implants:  Single chamber [employer] pacemaker  Signed by:  Pee Garcia MD  6/3/2022  10:10 AM

## 2022-06-06 ENCOUNTER — TELEPHONE (OUTPATIENT)
Dept: CARDIOLOGY CLINIC | Age: 77
End: 2022-06-06

## 2022-06-06 NOTE — TELEPHONE ENCOUNTER
Patient's wife is calling because her  had a pacemaker placed by the doctor. [employer] said that the paper work needs to be finished in order for them to do any transmission for the patient device.     [phone] patient    [phone] [id number]

## 2022-06-06 NOTE — TELEPHONE ENCOUNTER
LVM for patient. Was not sent report by Dr. Luis Armando Deluna - just put patient into the system. MDT should work now.

## 2022-06-17 ENCOUNTER — CLINICAL SUPPORT (OUTPATIENT)
Dept: CARDIOLOGY CLINIC | Age: 77
End: 2022-06-17
Payer: MEDICARE

## 2022-06-17 ENCOUNTER — CLINICAL SUPPORT (OUTPATIENT)
Dept: CARDIOLOGY CLINIC | Age: 77
End: 2022-06-17

## 2022-06-17 ENCOUNTER — TELEPHONE (OUTPATIENT)
Dept: CARDIOLOGY CLINIC | Age: 77
End: 2022-06-17

## 2022-06-17 DIAGNOSIS — Z95.0 CARDIAC PACEMAKER IN SITU: Primary | ICD-10-CM

## 2022-06-17 DIAGNOSIS — Z95.0 PACEMAKER: Primary | ICD-10-CM

## 2022-06-17 PROCEDURE — 93288 INTERROG EVL PM/LDLS PM IP: CPT | Performed by: INTERNAL MEDICINE

## 2022-06-17 PROCEDURE — 93279 PRGRMG DEV EVAL PM/LDLS PM: CPT | Performed by: INTERNAL MEDICINE

## 2022-06-17 NOTE — TELEPHONE ENCOUNTER
Pt came into clinic today for wound/ device check post pacemaker implant. He provided list of BP, HR readings taken since atenolol increase. 6/8/22:  115/71, 81  120/80, 83  98/59, 82  100/63, 80  106/64, 82  106/70, 83    6/10/22:  106/65, 82  116/71, 91  106/64, 81    6/11/22:  112/72, 83  111/71, 81    6/13/22:  120/79, 80  103/68, 85  101/65, 81  95/61, 82  94/58, 81  119/80, 90  124/80, 85  121/84, 82  122/87, 81    6/14/22:  106/73, 83  109/74, 81    6/15/22:  112/73, 82  107/70, 82  122/72, 82  109/73, 85  121/87, 83  125/80, 80  127/88, 80  108/74, 84    6/17/22:  117/80, 80    Forwarding to  and Roz Frederick NP to review. Paper copy placed in [place]'s folder.

## 2022-06-17 NOTE — PROGRESS NOTES
Cardiac Electrophysiology Wound Check Note      Wound Check   Patient is here for wound check. No fever, drainage   Physical Exam   Constitutional: well-developed and well-nourished. Skin: Left side pocket is healing without redness, drainage, hematoma. The wound is intact with skin glue. Mild bruising present. ASSESSMENT and PLAN   The incision is healing without redness, drainage, hematoma. Intact with skin glue. The patient has finished their anti-biotic and been compliant with arm restrictions. They will continue arms restrictions for 2 more weeks.   current treatment plan is effective, no change in therapy   Device check shows proper lead and generator functions    Follow-up Disposition:  Return 3 months I will check via device clinic or remote monitoring in the future    Future Appointments   Date Time Provider Zoe Cyr   9/22/2022  9:20 AM MD MARY Cisneros   9/22/2022  9:20 AM SABRINA HEMPHILL AMB

## 2022-07-08 ENCOUNTER — TELEPHONE (OUTPATIENT)
Dept: CARDIOLOGY CLINIC | Age: 77
End: 2022-07-08

## 2022-07-08 NOTE — TELEPHONE ENCOUNTER
Returned patient call, ID verified using two patient identifiers. Informed patient that there are no contraindications to wearing the lead vest during his dental xrays as far as his pacemaker is concerned. Informed patient that he can always contact [employer] with any questions concerning his pacemaker. Patient verbalizes understanding of all information.

## 2022-07-08 NOTE — TELEPHONE ENCOUNTER
Patient states he had an ablation procedure and pacemaker installed.  Patient states dentist is inquiring whether he can have a panoramic xray, is there an issue using a lead vest?          PHONE:[phone]

## 2022-07-21 ENCOUNTER — TRANSCRIBE ORDER (OUTPATIENT)
Dept: SCHEDULING | Age: 77
End: 2022-07-21

## 2022-07-21 DIAGNOSIS — T45.1X5A AGRANULOCYTOSIS SECONDARY TO CANCER CHEMOTHERAPY (HCC): ICD-10-CM

## 2022-07-21 DIAGNOSIS — D70.1 AGRANULOCYTOSIS SECONDARY TO CANCER CHEMOTHERAPY (CODE) (HCC): ICD-10-CM

## 2022-07-21 DIAGNOSIS — Z51.11 ENCOUNTER FOR ANTINEOPLASTIC CHEMOTHERAPY: ICD-10-CM

## 2022-07-21 DIAGNOSIS — J22 UNSPECIFIED ACUTE LOWER RESPIRATORY INFECTION: ICD-10-CM

## 2022-07-21 DIAGNOSIS — C18.7 MALIGNANT NEOPLASM OF SIGMOID COLON (HCC): Primary | ICD-10-CM

## 2022-07-21 DIAGNOSIS — D70.1 AGRANULOCYTOSIS SECONDARY TO CANCER CHEMOTHERAPY (HCC): ICD-10-CM

## 2022-07-26 ENCOUNTER — DOCUMENTATION ONLY (OUTPATIENT)
Dept: CARDIOLOGY CLINIC | Age: 77
End: 2022-07-26

## 2022-07-26 NOTE — PROGRESS NOTES
Received labs dated 07/19/2022 from [facility].     WBC 5.7  Hgb 12.7  Hct 37.8  Plt 126

## 2022-08-03 ENCOUNTER — TELEPHONE (OUTPATIENT)
Dept: CARDIOLOGY CLINIC | Age: 77
End: 2022-08-03

## 2022-08-03 NOTE — TELEPHONE ENCOUNTER
Patient said he was taking atenolol 100 mg in the morning and in the evening. Now his medicine has changed it's now 50 mg in the evening and in morning. Patient would like to know if he should take it split or should he just take one pill for the day. Please call back to give clarification.     [phone]

## 2022-08-03 NOTE — TELEPHONE ENCOUNTER
Verified patient with two types of identifiers. Patient states he has been taking his Atenolol 50 mg BID but was reading over his discharge paper work from 6/3/22 and was concerned he was supposed to be taking 100 mg daily at once. Notified patient that he should continue to take it however he was taking it when he reported his BP long on 6/17/22- those BP look good. Patient will continue to take Atenolol 50 mg BID. His blood pressures have been running pretty consistent to numbers reported in June. Will continue to monitor. Patient verbalized understanding and will call with any other questions.       Future Appointments   Date Time Provider Zoe Cyr   9/22/2022  9:20 AM MD MARY Arias BS AMB   9/22/2022  9:20 AM PACEMAKER3SABRINA BS AMB   2/7/2023  9:30 AM [facility] CT 2 [facility]

## 2022-09-22 ENCOUNTER — OFFICE VISIT (OUTPATIENT)
Dept: CARDIOLOGY CLINIC | Age: 77
End: 2022-09-22

## 2022-09-22 ENCOUNTER — CLINICAL SUPPORT (OUTPATIENT)
Dept: CARDIOLOGY CLINIC | Age: 77
End: 2022-09-22
Payer: MEDICARE

## 2022-09-22 VITALS
OXYGEN SATURATION: 99 % | SYSTOLIC BLOOD PRESSURE: 110 MMHG | BODY MASS INDEX: 31.55 KG/M2 | HEART RATE: 87 BPM | HEIGHT: 69 IN | RESPIRATION RATE: 18 BRPM | WEIGHT: 213 LBS | DIASTOLIC BLOOD PRESSURE: 84 MMHG

## 2022-09-22 DIAGNOSIS — Z95.0 PACEMAKER: Primary | ICD-10-CM

## 2022-09-22 DIAGNOSIS — I97.190 COMPLETE AV BLOCK DUE TO AV NODAL ABLATION (HCC): ICD-10-CM

## 2022-09-22 DIAGNOSIS — Z95.0 CARDIAC PACEMAKER IN SITU: Primary | ICD-10-CM

## 2022-09-22 DIAGNOSIS — Z79.01 ANTICOAGULATED: ICD-10-CM

## 2022-09-22 DIAGNOSIS — I77.810 AORTIC ROOT DILATION (HCC): ICD-10-CM

## 2022-09-22 DIAGNOSIS — I48.19 PERSISTENT ATRIAL FIBRILLATION (HCC): ICD-10-CM

## 2022-09-22 DIAGNOSIS — I44.2 COMPLETE AV BLOCK DUE TO AV NODAL ABLATION (HCC): ICD-10-CM

## 2022-09-22 PROCEDURE — 1123F ACP DISCUSS/DSCN MKR DOCD: CPT | Performed by: INTERNAL MEDICINE

## 2022-09-22 PROCEDURE — 1101F PT FALLS ASSESS-DOCD LE1/YR: CPT | Performed by: INTERNAL MEDICINE

## 2022-09-22 PROCEDURE — 93288 INTERROG EVL PM/LDLS PM IP: CPT | Performed by: INTERNAL MEDICINE

## 2022-09-22 PROCEDURE — G8417 CALC BMI ABV UP PARAM F/U: HCPCS | Performed by: INTERNAL MEDICINE

## 2022-09-22 PROCEDURE — G8754 DIAS BP LESS 90: HCPCS | Performed by: INTERNAL MEDICINE

## 2022-09-22 PROCEDURE — G8510 SCR DEP NEG, NO PLAN REQD: HCPCS | Performed by: INTERNAL MEDICINE

## 2022-09-22 PROCEDURE — G8752 SYS BP LESS 140: HCPCS | Performed by: INTERNAL MEDICINE

## 2022-09-22 PROCEDURE — G8427 DOCREV CUR MEDS BY ELIG CLIN: HCPCS | Performed by: INTERNAL MEDICINE

## 2022-09-22 PROCEDURE — 99214 OFFICE O/P EST MOD 30 MIN: CPT | Performed by: INTERNAL MEDICINE

## 2022-09-22 PROCEDURE — G8536 NO DOC ELDER MAL SCRN: HCPCS | Performed by: INTERNAL MEDICINE

## 2022-09-22 NOTE — PROGRESS NOTES
Chargeable DC PM clinic check. (MDT). Device functioning appropriately as programmed. See scanned documents in media manger.

## 2022-09-22 NOTE — PROGRESS NOTES
Cardiac Electrophysiology OFFICE Note     Subjective:       Rene Gan is a 76 y.o. patient who presents for follow up s/p AV node ablation & single lead pacemaker (DOI 06/03/2022). On Atenolol 50 mg po bid     States stamina has improved, is able to walk across the parking lot without difficulty. BP controlled, has occasional low normal readings with orthostatic lightheadedness at home. Anticoagulated with Eliquis. Denies bleeding issues. Previous:   Lower extremity edema with diltiazem. Primary cardiologist is Dr. Scott Underwood a cath right chest.     S/p colectomy in 03/2021, followed by Dr. India Turcios at [facility]. Completed chemo. 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 [facility]) ICD-10-CM: I97.190, I44.2  ICD-9-CM: 997.1, 426.0  6/3/2022    Overview Signed 6/3/2022 10:09 AM by Agatha Parkinson MD     6/3/2022             Colon cancer [facility]) ICD-10-CM: C18.9  ICD-9-CM: 153.9  3/26/2021        Paroxysmal A-fib ([facility] ICD-10-CM: I48.0  ICD-9-CM: 427.31  2/25/2016        Afib ([facility] ICD-10-CM: I48.91  ICD-9-CM: 427.31  1/24/2013           Current Outpatient Medications   Medication Sig Dispense Refill   · atenoloL (TENORMIN) 50 mg tablet Take 2 Tablets by mouth daily. Indications: high blood pressure 360 Tablet 1   · apixaban (Eliquis) 5 mg tablet Take 1 Tablet by mouth every twelve (12) hours.  180 Tablet 3     No Known Allergies   Past Medical History:   Diagnosis Date   · Arrhythmia   a-fib   · Cancer ([facility] 03/2021   Colon   · Essential hypertension     Past Surgical History:   Procedure Laterality Date   · COLONOSCOPY N/A 1/26/2021   COLONOSCOPY performed by Maria Victoria Bethea MD at [facility] ENDOSCOPY   · COLONOSCOPY N/A 12/15/2021   COLONOSCOPY performed by Maria Victoria Bethea MD at [facility] ENDOSCOPY · COLONOSCOPY,DIAGNOSTIC 12/15/2021     · Tanya Marten 1/26/2021     · COLONOSCPY,FLEX,W/DIR SUBMUC INJECT 1/26/2021     · HX TONSILLECTOMY as a child     Family History   Problem Relation Age of Onset   · Coronary Art Dis Paternal Uncle   · Dementia Mother   · Heart Disease Father   · Dementia Father     Social History     Tobacco Use   · Smoking status: Never   · Smokeless tobacco: Never   Substance Use Topics   · Alcohol use: Not Currently   Comment: >5yrs         Review of Systems:   Constitutional: Negative for fever, chills, weight loss, + malaise/fatigue. HEENT: Negative for nosebleeds, vision changes. Respiratory: Negative for cough, hemoptysis. Cardiovascular: Negative for chest pain, orthopnea, claudication, leg swelling, syncope, and PND. + SOB   Gastrointestinal: Negative for nausea, vomiting, diarrhea, blood in stool and melena. Genitourinary: Negative for dysuria, and hematuria. Musculoskeletal: Negative for myalgias, arthralgia. Skin: Negative for rash. Heme: Does not bleed or bruise easily. Neurological: Negative for speech change and focal weakness. + neuropathy improved       Objective:     Visit Vitals   /84 (BP 1 Location: Left upper arm, BP Patient Position: Sitting, BP Cuff Size: Adult)   Pulse 87   Resp 18   Ht 5' 9\" (1.753 m)   Wt 213 lb (96.6 kg)   SpO2 99%   BMI 31.45 kg/m²         Physical Exam:   Constitutional: Well-developed and well-nourished. No respiratory distress. Head: Normocephalic and atraumatic. Eyes: Pupils are equal, round. ENT: Hearing grossly normal.   Neck: Supple. No JVD present. Cardiovascular: Regular rate, regular rhythm. Exam reveals no gallop and no friction rub. No murmur heard. Pulmonary/Chest: Effort normal and breath sounds normal. No wheezes. Right chest port a cath. Abdominal: Soft, obese. Musculoskeletal: Moves extremities independently. Normal gait. Vasc/lymphatic: No leg edema.    Neurological: Alert, oriented. Skin: Skin is warm and dry. Left chest pacemaker site well healed, mildly prominent along lateral border. Right chest port a cath healed. Psychiatric: Normal mood and affect. Behavior is normal. Judgment and thought content normal.           Assessment/Plan:     Imaging/Studies:   Echo (03/11/2022): LVEF 55-60%, mild LVH. LA mod dilated, RA mildly dilated. Mild MR with centrally directed jet. Mild AR. Mild to mod TR. Mild IL. Mildly dilated aortic root, mildly dilated ascending aorta. CT chest with contrast (02/15/2022): Calcification at origin of superior mesenteric artery & right renal artery. Tiny subpleural nodule in lateral aspect of right upper lobe & tiny parenchymal nodule in the central RUL likely unchanged. Right basilar scarring/atelectasis, & minimal LLL scarring/atelectasis. Anastomotic suture line at rectosigmoid junction without evidence of residual or recurrent disease. Appendicolith at the tip of the appendix with the suggestion of a tiny collection of fluid. Holter (11/2021): HR  bpm, avg 124 bpm.  100% AF burden, rare PVCs. Holter (08/2021): -160 bpm, avg 136 bpm.  100% AF burden. Rare PVCs. ICD-10-CM ICD-9-CM    1. Pacemaker  Z95.0 V45.01       2. Persistent atrial fibrillation (HCC)  I48.19 427.31       3. 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 Anticoagulated  Z79.01 V58.61       5. Aortic root dilation ([facility])  I77.810 447.71           Medtronic single lead pacemaker (DOI 06/03/2022): Device check today shows proper lead & generator function. Generator longevity estimated 12 yrs, 11 mos. %. Single NSVT episode since 06/17/2022, lasted 1 second. this is short and he is not aware of  Continue with atenolol    At risk for RV pacer induced cardiomyopathy if pacing all the times, had normal LVEF in 03/2022.   Discussed that CRT upgrade would be indicated if he develops reduced LVEF on echo or when he has STEWART or shortness of breaths or edema and he will need to call me. Persistent AF:  Permanently rate controlled s/p AV node ablation. Atenolol no longer needed for rate control, but will continue for dilated aortic root & ascending aorta as well as BP control. Dilated ascending aorta/aortic root: Mild, also with calcification of superior mesenteric artery & right renal artery. He will establish a PCP to manage lipids or have Dr. Emy Haynes (oncology) check. Repeat chest CT with contrast pending in 02/2023. Anticoagulation: Continue Eliquis for embolic CVA prophylaxis. Denies recent bleeding issues. Remote pacer checks q 3 months. Follow up in 1 year in EP clinic. Future Appointments   Date Time Provider Zoe Cyr   12/29/2022  9:40 AM PACEMAKER3, SABRINA MORELOS   2/7/2023  9:30 AM [facility] CT 2 [facility] REG   3/30/2023  9:40 AM PACEMAKER3, SABRINA MARQUEZ AMB           Thank you for involving me in this patient's care and please call with further concerns or questions. Ana Pittman M.D.    Electrophysiology/Cardiology   [facility] [place]   [address] [place] [address]                                [phone]

## 2022-12-29 ENCOUNTER — OFFICE VISIT (OUTPATIENT)
Dept: CARDIOLOGY CLINIC | Age: 77
End: 2022-12-29
Payer: MEDICARE

## 2022-12-29 DIAGNOSIS — Z95.0 CARDIAC PACEMAKER IN SITU: Primary | ICD-10-CM

## 2022-12-29 NOTE — PROGRESS NOTES
C/ MDT PACER CHECK   Device functioning appropriately as programmed. See scanned documents in media manger.

## 2023-01-09 ENCOUNTER — DOCUMENTATION ONLY (OUTPATIENT)
Dept: CARDIOLOGY CLINIC | Age: 78
End: 2023-01-09

## 2023-01-27 ENCOUNTER — TELEPHONE (OUTPATIENT)
Dept: CARDIOLOGY CLINIC | Age: 78
End: 2023-01-27

## 2023-01-27 NOTE — TELEPHONE ENCOUNTER
Patient is calling to speak with Hiram Hermosillo in regards to him having a CT Scan and he wanted to make sure that is okay with him having a pacemaker. Please Advise.      [phone]

## 2023-01-27 NOTE — TELEPHONE ENCOUNTER
Verified patient with two types of identifiers. Notified that it is okay to have a CT scan with his device. Patient verbalized understanding and will call with any other questions.       Future Appointments   Date Time Provider Zoe Klarissa   2/7/2023  9:30 AM [facility] CT 2 [facility] REG   2/17/2023  1:00 PM Carlos Anderson MD [facility] BS AMB   3/30/2023  9:40 AM PACEMAKER3, SABRINA HERNANDEZ BS AMB   6/29/2023  3:20 PM PACEMAKER3, SABRINA HERNANDEZ BS AMB   6/29/2023  3:40 PM MD MARY Keller BS AMB

## 2023-02-07 ENCOUNTER — HOSPITAL ENCOUNTER (OUTPATIENT)
Dept: CT IMAGING | Age: 78
Discharge: HOME | End: 2023-02-07
Attending: INTERNAL MEDICINE
Payer: MEDICARE

## 2023-02-07 DIAGNOSIS — D70.1 AGRANULOCYTOSIS SECONDARY TO CANCER CHEMOTHERAPY (CODE) (HCC): ICD-10-CM

## 2023-02-07 DIAGNOSIS — C18.7 MALIGNANT NEOPLASM OF SIGMOID COLON (HCC): ICD-10-CM

## 2023-02-07 DIAGNOSIS — Z51.11 ENCOUNTER FOR ANTINEOPLASTIC CHEMOTHERAPY: ICD-10-CM

## 2023-02-07 DIAGNOSIS — J22 UNSPECIFIED ACUTE LOWER RESPIRATORY INFECTION: ICD-10-CM

## 2023-02-07 LAB — CREAT BLD-MCNC: 0.8 MG/DL (ref 0.6–1.3)

## 2023-02-07 PROCEDURE — 82565 ASSAY OF CREATININE: CPT

## 2023-02-07 PROCEDURE — 74011000636 HC RX REV CODE- 636: Performed by: INTERNAL MEDICINE

## 2023-02-07 PROCEDURE — 71260 CT THORAX DX C+: CPT

## 2023-02-07 PROCEDURE — 74177 CT ABD & PELVIS W/CONTRAST: CPT

## 2023-02-07 RX ORDER — BARIUM SULFATE 20 MG/ML
900 SUSPENSION ORAL
Status: ACTIVE | OUTPATIENT
Start: 2023-02-07 | End: 2023-02-07

## 2023-02-07 RX ADMIN — IOPAMIDOL 100 ML: 755 INJECTION, SOLUTION INTRAVENOUS at 10:02

## 2023-02-17 ENCOUNTER — OFFICE VISIT (OUTPATIENT)
Dept: INTERNAL MEDICINE CLINIC | Age: 78
End: 2023-02-17
Payer: MEDICARE

## 2023-02-17 VITALS
DIASTOLIC BLOOD PRESSURE: 84 MMHG | BODY MASS INDEX: 31.4 KG/M2 | WEIGHT: 207.2 LBS | HEART RATE: 84 BPM | RESPIRATION RATE: 16 BRPM | HEIGHT: 68 IN | SYSTOLIC BLOOD PRESSURE: 119 MMHG | TEMPERATURE: 97.2 F | OXYGEN SATURATION: 98 %

## 2023-02-17 DIAGNOSIS — R73.09 ABNORMAL GLUCOSE: ICD-10-CM

## 2023-02-17 DIAGNOSIS — E66.9 OBESITY (BMI 30-39.9): ICD-10-CM

## 2023-02-17 DIAGNOSIS — Z13.220 SCREENING FOR CHOLESTEROL LEVEL: ICD-10-CM

## 2023-02-17 DIAGNOSIS — I48.19 PERSISTENT ATRIAL FIBRILLATION (HCC): Primary | ICD-10-CM

## 2023-02-17 DIAGNOSIS — R39.15 URINARY URGENCY: ICD-10-CM

## 2023-02-17 DIAGNOSIS — N47.8 FORESKIN DOES NOT RETRACT: ICD-10-CM

## 2023-02-17 PROBLEM — I77.810 AORTIC ROOT DILATION (HCC): Status: ACTIVE | Noted: 2023-02-17

## 2023-02-17 LAB
ALBUMIN SERPL-MCNC: 4.2 G/DL (ref 3.5–5)
ALBUMIN/GLOB SERPL: 1.5 (ref 1.1–2.2)
ALP SERPL-CCNC: 87 U/L (ref 45–117)
ALT SERPL-CCNC: 28 U/L (ref 12–78)
ANION GAP SERPL CALC-SCNC: 10 MMOL/L (ref 5–15)
AST SERPL-CCNC: 31 U/L (ref 15–37)
BILIRUB SERPL-MCNC: 1.5 MG/DL (ref 0.2–1)
BUN SERPL-MCNC: 25 MG/DL (ref 6–20)
BUN/CREAT SERPL: 26 (ref 12–20)
CALCIUM SERPL-MCNC: 9.2 MG/DL (ref 8.5–10.1)
CHLORIDE SERPL-SCNC: 103 MMOL/L (ref 97–108)
CHOLEST SERPL-MCNC: 177 MG/DL
CO2 SERPL-SCNC: 24 MMOL/L (ref 21–32)
CREAT SERPL-MCNC: 0.96 MG/DL (ref 0.7–1.3)
EST. AVERAGE GLUCOSE BLD GHB EST-MCNC: 105 MG/DL
GLOBULIN SER CALC-MCNC: 2.8 G/DL (ref 2–4)
GLUCOSE SERPL-MCNC: 96 MG/DL (ref 65–100)
HBA1C MFR BLD: 5.3 % (ref 4–5.6)
HDLC SERPL-MCNC: 69 MG/DL
HDLC SERPL: 2.6 (ref 0–5)
LDLC SERPL CALC-MCNC: 95 MG/DL (ref 0–100)
POTASSIUM SERPL-SCNC: 4.5 MMOL/L (ref 3.5–5.1)
PROT SERPL-MCNC: 7 G/DL (ref 6.4–8.2)
SODIUM SERPL-SCNC: 137 MMOL/L (ref 136–145)
TRIGL SERPL-MCNC: 65 MG/DL (ref ?–150)
VLDLC SERPL CALC-MCNC: 13 MG/DL

## 2023-02-17 RX ORDER — LANOLIN ALCOHOL/MO/W.PET/CERES
CREAM (GRAM) TOPICAL DAILY
COMMUNITY
Start: 2023-01-18

## 2023-02-17 NOTE — PROGRESS NOTES
Reviewed record in preparation for visit and have obtained necessary documentation. Identified pt with two pt identifiers(name and ). Chief Complaint   Patient presents with    Establish Care    Other     Wants to see about urology due to foreskin and prostate concerns        Visit Vitals  /84 (BP 1 Location: Right upper arm, BP Patient Position: Sitting, BP Cuff Size: Adult long)   Pulse 84   Temp 97.2 °F (36.2 °C) (Temporal)   Resp 16   Ht 5' 7.7\" (1.72 m)   Wt 207 lb 3.2 oz (94 kg)   SpO2 98%   BMI 31.78 kg/m²       Health Maintenance Due   Topic Date Due    Hepatitis C Test  Never done    COVID-19 Vaccine (1) Never done    Pneumococcal Vaccine (1 - PCV) Never done    Shingles Vaccine (1 of 2) Never done    DTaP/Tdap/Td  (1 - Tdap) Never done    Annual Well Visit  Never done    Yearly Flu Vaccine (1) Never done       Med Reconciliation: Completed        Coordination of Care Questionnaire:  :     1. \"Have you been to the ER, urgent care clinic since your last visit? Hospitalized since your last visit? \" No    2. \"Have you seen or consulted any other health care providers outside of the [address] [place] [address] since your last visit? \" Yes Cardiology Dr. Karthik Trimble        3. For patients aged 39-70: Has the patient had a colonoscopy / FIT/ Cologuard? Yes - no Care Gap present Dr Laura Guzman       If the patient is female:    4. For patients aged 41-77: Has the patient had a mammogram within the past 2 years? No      5. For patients aged 21-65: Has the patient had a pap smear?  No

## 2023-02-17 NOTE — PROGRESS NOTES
[facility]   Internal Medicine  [address], [address] [facility]  [address]  [place], [address] [facility]  [phone]      Primary Care Visit Note    Assessment/Plan:     Diagnoses and all orders for this visit:    Afib - s/p ablation and PPM placement, on atenolol and apixapan. Following with EP - Dr. Kirstin Slater - dx'd 2021, s/p chemo and radiation, [facility] . Currently in clinical study to use cancer DNA to detect recurrence. 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 Will look into VIIS re Tdap and PNA. Follow up: Follow-up and Dispositions    Return in about 6 months (around 8/17/2023) for Complete physical exam .         Qasim Walsh MD    CC:     Chief Complaint   Patient presents with    Establish Care    Other     Wants to see about urology due to foreskin and prostate concerns        HPI:     Johnson Bone is a 76 y.o. male who presents:    Establishing care. Pt has been in herbal medicine and remained in good health until he developed Afib. Afib - pt had been on digoxin in the past. Was previously only going in afib occasionally but since chemo it has been more persistent. He had an ablation and ppm placed. He has been seeing cardiology every 3 months but may push out. Had been seeing Dr. Magan Hinkle but may go back to seeing Dr. Lata Lomeli.     Colon cancer - pt dx'd in 2021 on routine colonoscopy - s/p surgery with appox 1ft of colon removed.  Had port placed with 6 mo of chemo. Last Ct scan was last week which was clear. He is in a study looking for cancer DNA in blood. Last dose of chemo was in 2021. Anemia - developed since starting chemo and remains low. Peripheral neuropathy - stemming from chemo going to podiatry. Patient is requesting information re urology given that he has frequency at night. PSA was 2.1. Foreskin will not retract for months to years. No pain. Patient suspects low T and low Thyroid. He has hx of vitiligo but his skin has gotten a lot more pale since chemo and has become less noticeable. ROS:   All 10 point review of systems negative except those mentioned in the HPI. 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 , Disp: , Rfl:     atenoloL (TENORMIN) 50 mg tablet, Take 2 Tablets by mouth daily. Indications: high blood pressure, Disp: 360 Tablet, Rfl: 1    apixaban (Eliquis) 5 mg tablet, Take 1 Tablet by mouth every twelve (12) hours. , Disp: 180 Tablet, Rfl: 3      Past Surgical History:     Past Surgical History:   Procedure Laterality Date    COLONOSCOPY N/A 01/26/2021    COLONOSCOPY performed by Ubaldo Jaimes MD at [facility] ENDOSCOPY    COLONOSCOPY N/A 12/15/2021    COLONOSCOPY performed by Ubaldo Jaimes MD at [facility] [place] [facility]  12/15/2021         ZACKARY,SABRINA Kwong  01/26/2021 COLONOSCPY,FLEX,W/DIR SUBMUC INJECT  01/26/2021         HX PACEMAKER Left 06/03/2022    HX TONSILLECTOMY  as a child         Family History:     Family History   Problem Relation Age of Onset    Coronary Art Dis Paternal Uncle     Dementia Mother     Heart Disease Father     Dementia Father          Social History:     Social History     Socioeconomic History    Marital status:      Spouse name: Not on file    Number of children: Not on file    Years of education: Not on file    Highest education level: Not on file   Occupational History    Not on file   Tobacco Use    Smoking status: Never    Smokeless tobacco: Never   Vaping Use    Vaping Use: Never used   Substance and Sexual Activity    Alcohol use: Not Currently     Comment: >5yrs    Drug use: Yes     Types: Prescription, OTC    Sexual activity: Yes     Partners: Female   Other Topics Concern    Not on file   Social History Narrative    Not on file     Social Determinants of Health     Financial Resource Strain: Not on file   Food Insecurity: Not on file   Transportation Needs: Not on file   Physical Activity: Inactive    Days of Exercise per Week: 0 days    Minutes of Exercise per Session: 0 min   Stress: Not on file   Social Connections: Not on file   Intimate Partner Violence: Not At Risk    Fear of Current or Ex-Partner: No    Emotionally Abused: No    Physically Abused: No    Sexually Abused: No   Housing Stability: Not on file            Visit Vitals  /84 (BP 1 Location: Right upper arm, BP Patient Position: Sitting, BP Cuff Size: Adult long)   Pulse 84   Temp 97.2 °F (36.2 °C) (Temporal)   Resp 16   Ht 5' 7.7\" (1.72 m)   Wt 207 lb 3.2 oz (94 kg)   SpO2 98%   BMI 31.78 kg/m²       Physical Exam:   General - Well appearing male  HEENT - PERRL, TM no erythema/opacification, normal nasal turbinates, no oropharyngeal erythema or exudate, MMM  Neck - supple, no thyroidomegaly, no lymphadenopathy  Pulm - clear to auscultation bilaterally  Cardio - RRR, normal S1 S2, no murmur  Abd - soft, nontender, no masses, no HSM  Extrem - no edema, +2 distal pulses  Neuro-  Alert and oriented, No focal deficits           Labs/Imaging:     Labs and imaging reviewed by me and significant for:      Pt had labs done a [facility] on 1/5/23   Wbc 6.2, hgb 12.5, plt 134. TTE    Left Ventricle: Left ventricle size is normal. Mildly increased wall thickness. Normal wall motion. Normal left ventricular systolic function with a visually estimated EF of 55 - 60%. Aortic Valve: Mild transvalvular regurgitation. Mitral Valve: Mild transvalvular regurgitation with a centrally directed jet. Tricuspid Valve: Mild to moderate transvalvular regurgitation. Pulmonic Valve: Mild transvalvular regurgitation. Left Atrium: Left atrium is moderately dilated. Right Atrium: Right atrium is mildly dilated. Aorta: Mildly dilated aortic root. Mildly dilated ascending aorta.

## 2023-03-17 ENCOUNTER — TELEPHONE (OUTPATIENT)
Dept: CARDIOLOGY CLINIC | Age: 78
End: 2023-03-17

## 2023-03-17 DIAGNOSIS — I48.20 CHRONIC A-FIB (HCC): ICD-10-CM

## 2023-03-17 NOTE — TELEPHONE ENCOUNTER
Request for Eliquis 5 mg BID. Last office visit 9/22/22, next office visit 6/29/23. Refills per verbal order from Dr. Darleen Roca.

## 2023-03-17 NOTE — TELEPHONE ENCOUNTER
Pt called and stated he need either doctor or NP to renew his prescription for Eliquis 5 mg. .please advise    Pt [phone]

## 2023-03-28 ENCOUNTER — TRANSCRIBE ORDER (OUTPATIENT)
Dept: SCHEDULING | Age: 78
End: 2023-03-28

## 2023-03-28 DIAGNOSIS — J22 UNSPECIFIED ACUTE LOWER RESPIRATORY INFECTION: ICD-10-CM

## 2023-03-28 DIAGNOSIS — D70.1 AGRANULOCYTOSIS SECONDARY TO CANCER CHEMOTHERAPY (HCC): ICD-10-CM

## 2023-03-28 DIAGNOSIS — Z51.11 ENCOUNTER FOR ANTINEOPLASTIC CHEMOTHERAPY: ICD-10-CM

## 2023-03-28 DIAGNOSIS — T45.1X5A AGRANULOCYTOSIS SECONDARY TO CANCER CHEMOTHERAPY (HCC): ICD-10-CM

## 2023-03-28 DIAGNOSIS — C18.7 MALIGNANT NEOPLASM OF SIGMOID COLON (HCC): Primary | ICD-10-CM

## 2023-03-30 ENCOUNTER — OFFICE VISIT (OUTPATIENT)
Dept: CARDIOLOGY CLINIC | Age: 78
End: 2023-03-30

## 2023-03-30 ENCOUNTER — DOCUMENTATION ONLY (OUTPATIENT)
Dept: INTERNAL MEDICINE CLINIC | Age: 78
End: 2023-03-30

## 2023-03-30 DIAGNOSIS — Z95.0 CARDIAC PACEMAKER IN SITU: Primary | ICD-10-CM

## 2023-04-07 ENCOUNTER — DOCUMENTATION ONLY (OUTPATIENT)
Dept: INTERNAL MEDICINE CLINIC | Age: 78
End: 2023-04-07

## 2023-06-23 ENCOUNTER — CLINICAL DOCUMENTATION (OUTPATIENT)
Age: 78
End: 2023-06-23

## 2023-06-27 ENCOUNTER — TELEPHONE (OUTPATIENT)
Age: 78
End: 2023-06-27

## 2023-06-30 ENCOUNTER — TELEPHONE (OUTPATIENT)
Age: 78
End: 2023-06-30

## 2023-06-30 RX ORDER — ATENOLOL 50 MG/1
100 TABLET ORAL DAILY
Qty: 180 TABLET | Refills: 1 | Status: SHIPPED | OUTPATIENT
Start: 2023-06-30

## 2023-08-05 NOTE — PROGRESS NOTES
[address] [facility]  Cardiac Electrophysiology Clinic Care Note                  []Initial visit     []Established visit     Patient Name: Shu Albarran - Y:[birth year] - YQS:[id number]  Primary Cardiologist: Mckenzie Dominguez MD  Electrophysiologist: Miguel Cruz MD     Reason for visit: Pacemaker follow up    HPI:  Mr. Day Hogue is a 76 y.o. male who presents for follow up, is s/p AV node ablation & Medtronic single lead pacemaker (DOI 06/03/2022). He reports increased SOB over the last several months, not necessarily associated with exertion. He has known mild anemia, is using an iron supplement. Echo in 03/2022 showed normal LVEF with moderately dilated LA, mildly dilated RA, mild MR & AR, mild to moderate TR. BP controlled, has occasional low normal readings with orthostatic lightheadedness at home. Anticoagulated with Eliquis. Denies bleeding issues. Previous:  S/p AV node ablation & Medtronic single lead pacemaker (DOI 06/03/2022)    Lower extremity edema with diltiazem. Port a cath right chest.     S/p colectomy in 03/2021, followed by Dr. Shaunna Prajapati at [facility]. Completed chemo. Assessment and Plan     Medtronic single lead pacemaker (DOI 06/03/2022): Device check today shows proper lead & generator function. Generator longevity estimated 11.8 years. %. Since 03/30/2023, no events. At risk for RV pacer induced CM, had normal LVEF in 03/2022. Given increased SOB, will repeat 2D echo. If LVEF <50%, would recommend CRT upgrade. Persistent AF: Permanently rate controlled s/p AV node ablation. Atenolol no longer needed for rate control, but will continue for dilated aortic root & ascending aorta as well as BP control. Dilated ascending aorta/aortic root: Mild, also with calcification of superior mesenteric artery & right renal artery. PCP manages lipids. Last chest CT done 02/2023. Repeat echo will give opportunity for reassessment.

## 2023-08-11 ENCOUNTER — OFFICE VISIT (OUTPATIENT)
Age: 78
End: 2023-08-11
Payer: MEDICARE

## 2023-08-11 ENCOUNTER — PROCEDURE VISIT (OUTPATIENT)
Age: 78
End: 2023-08-11

## 2023-08-11 VITALS
WEIGHT: 204 LBS | RESPIRATION RATE: 16 BRPM | BODY MASS INDEX: 31.29 KG/M2 | DIASTOLIC BLOOD PRESSURE: 60 MMHG | HEART RATE: 94 BPM | OXYGEN SATURATION: 98 % | SYSTOLIC BLOOD PRESSURE: 110 MMHG

## 2023-08-11 DIAGNOSIS — I48.11 LONGSTANDING PERSISTENT ATRIAL FIBRILLATION (HCC): Primary | ICD-10-CM

## 2023-08-11 DIAGNOSIS — I44.2 COMPLETE AV BLOCK DUE TO AV NODAL ABLATION (HCC): ICD-10-CM

## 2023-08-11 DIAGNOSIS — Z95.0 CARDIAC PACEMAKER IN SITU: Primary | ICD-10-CM

## 2023-08-11 DIAGNOSIS — I97.190 COMPLETE AV BLOCK DUE TO AV NODAL ABLATION (HCC): ICD-10-CM

## 2023-08-11 DIAGNOSIS — R06.02 SHORTNESS OF BREATH: ICD-10-CM

## 2023-08-11 DIAGNOSIS — I77.810 THORACIC AORTIC ECTASIA (HCC): ICD-10-CM

## 2023-08-11 DIAGNOSIS — Z95.0 PACEMAKER: ICD-10-CM

## 2023-08-11 DIAGNOSIS — Z79.01 ANTICOAGULATED: ICD-10-CM

## 2023-08-11 PROCEDURE — 1123F ACP DISCUSS/DSCN MKR DOCD: CPT | Performed by: NURSE PRACTITIONER

## 2023-08-11 PROCEDURE — 4004F PT TOBACCO SCREEN RCVD TLK: CPT | Performed by: NURSE PRACTITIONER

## 2023-08-11 PROCEDURE — 99214 OFFICE O/P EST MOD 30 MIN: CPT | Performed by: NURSE PRACTITIONER

## 2023-08-11 PROCEDURE — G8417 CALC BMI ABV UP PARAM F/U: HCPCS | Performed by: NURSE PRACTITIONER

## 2023-08-11 PROCEDURE — G8427 DOCREV CUR MEDS BY ELIG CLIN: HCPCS | Performed by: NURSE PRACTITIONER

## 2023-08-11 ASSESSMENT — PATIENT HEALTH QUESTIONNAIRE - PHQ9
SUM OF ALL RESPONSES TO PHQ QUESTIONS 1-9: 0
2. FEELING DOWN, DEPRESSED OR HOPELESS: 0
SUM OF ALL RESPONSES TO PHQ9 QUESTIONS 1 & 2: 0
SUM OF ALL RESPONSES TO PHQ QUESTIONS 1-9: 0
1. LITTLE INTEREST OR PLEASURE IN DOING THINGS: 0

## 2023-08-16 ENCOUNTER — ANCILLARY PROCEDURE (OUTPATIENT)
Age: 78
End: 2023-08-16
Payer: MEDICARE

## 2023-08-16 VITALS — WEIGHT: 204 LBS | BODY MASS INDEX: 30.92 KG/M2 | HEIGHT: 68 IN

## 2023-08-16 DIAGNOSIS — R06.02 SHORTNESS OF BREATH: ICD-10-CM

## 2023-08-16 DIAGNOSIS — I48.11 LONGSTANDING PERSISTENT ATRIAL FIBRILLATION (HCC): ICD-10-CM

## 2023-08-16 PROCEDURE — 93306 TTE W/DOPPLER COMPLETE: CPT

## 2023-08-17 ENCOUNTER — OFFICE VISIT (OUTPATIENT)
Age: 78
End: 2023-08-17
Payer: MEDICARE

## 2023-08-17 VITALS
DIASTOLIC BLOOD PRESSURE: 80 MMHG | RESPIRATION RATE: 14 BRPM | OXYGEN SATURATION: 97 % | HEART RATE: 82 BPM | SYSTOLIC BLOOD PRESSURE: 117 MMHG | BODY MASS INDEX: 30.04 KG/M2 | HEIGHT: 68 IN | WEIGHT: 198.2 LBS | TEMPERATURE: 97.1 F

## 2023-08-17 DIAGNOSIS — Z00.00 MEDICARE ANNUAL WELLNESS VISIT, SUBSEQUENT: Primary | ICD-10-CM

## 2023-08-17 DIAGNOSIS — I97.190 COMPLETE AV BLOCK DUE TO AV NODAL ABLATION (HCC): ICD-10-CM

## 2023-08-17 DIAGNOSIS — G62.0 DRUG-INDUCED POLYNEUROPATHY (HCC): ICD-10-CM

## 2023-08-17 DIAGNOSIS — N47.1 PHIMOSIS: ICD-10-CM

## 2023-08-17 DIAGNOSIS — D64.81 ANEMIA DUE TO ANTINEOPLASTIC CHEMOTHERAPY: ICD-10-CM

## 2023-08-17 DIAGNOSIS — I44.2 COMPLETE AV BLOCK DUE TO AV NODAL ABLATION (HCC): ICD-10-CM

## 2023-08-17 DIAGNOSIS — T45.1X5A ANEMIA DUE TO ANTINEOPLASTIC CHEMOTHERAPY: ICD-10-CM

## 2023-08-17 DIAGNOSIS — D69.6 THROMBOCYTOPENIA (HCC): ICD-10-CM

## 2023-08-17 DIAGNOSIS — I71.21 ANEURYSM OF ASCENDING AORTA WITHOUT RUPTURE (HCC): ICD-10-CM

## 2023-08-17 DIAGNOSIS — Z23 NEED FOR PROPHYLACTIC VACCINATION AGAINST STREPTOCOCCUS PNEUMONIAE (PNEUMOCOCCUS): ICD-10-CM

## 2023-08-17 DIAGNOSIS — C18.9 MALIGNANT NEOPLASM OF COLON, UNSPECIFIED PART OF COLON (HCC): ICD-10-CM

## 2023-08-17 DIAGNOSIS — I48.20 CHRONIC ATRIAL FIBRILLATION (HCC): ICD-10-CM

## 2023-08-17 PROCEDURE — 90677 PCV20 VACCINE IM: CPT | Performed by: STUDENT IN AN ORGANIZED HEALTH CARE EDUCATION/TRAINING PROGRAM

## 2023-08-17 PROCEDURE — 1123F ACP DISCUSS/DSCN MKR DOCD: CPT | Performed by: STUDENT IN AN ORGANIZED HEALTH CARE EDUCATION/TRAINING PROGRAM

## 2023-08-17 PROCEDURE — PBSHW PNEUMOCOCCAL, PCV20, PREVNAR 20, (AGE 18 YRS+), IM, PF: Performed by: STUDENT IN AN ORGANIZED HEALTH CARE EDUCATION/TRAINING PROGRAM

## 2023-08-17 PROCEDURE — G0009 ADMIN PNEUMOCOCCAL VACCINE: HCPCS | Performed by: STUDENT IN AN ORGANIZED HEALTH CARE EDUCATION/TRAINING PROGRAM

## 2023-08-17 PROCEDURE — G0439 PPPS, SUBSEQ VISIT: HCPCS | Performed by: STUDENT IN AN ORGANIZED HEALTH CARE EDUCATION/TRAINING PROGRAM

## 2023-08-17 SDOH — ECONOMIC STABILITY: HOUSING INSECURITY
IN THE LAST 12 MONTHS, WAS THERE A TIME WHEN YOU DID NOT HAVE A STEADY PLACE TO SLEEP OR SLEPT IN A SHELTER (INCLUDING NOW)?: NO

## 2023-08-17 SDOH — ECONOMIC STABILITY: FOOD INSECURITY: WITHIN THE PAST 12 MONTHS, THE FOOD YOU BOUGHT JUST DIDN'T LAST AND YOU DIDN'T HAVE MONEY TO GET MORE.: NEVER TRUE

## 2023-08-17 SDOH — ECONOMIC STABILITY: FOOD INSECURITY: WITHIN THE PAST 12 MONTHS, YOU WORRIED THAT YOUR FOOD WOULD RUN OUT BEFORE YOU GOT MONEY TO BUY MORE.: NEVER TRUE

## 2023-08-17 SDOH — ECONOMIC STABILITY: INCOME INSECURITY: HOW HARD IS IT FOR YOU TO PAY FOR THE VERY BASICS LIKE FOOD, HOUSING, MEDICAL CARE, AND HEATING?: NOT HARD AT ALL

## 2023-08-17 ASSESSMENT — LIFESTYLE VARIABLES
HOW MANY STANDARD DRINKS CONTAINING ALCOHOL DO YOU HAVE ON A TYPICAL DAY: PATIENT DOES NOT DRINK
HOW OFTEN DO YOU HAVE A DRINK CONTAINING ALCOHOL: NEVER

## 2023-08-17 ASSESSMENT — PATIENT HEALTH QUESTIONNAIRE - PHQ9
SUM OF ALL RESPONSES TO PHQ QUESTIONS 1-9: 0
SUM OF ALL RESPONSES TO PHQ9 QUESTIONS 1 & 2: 0
1. LITTLE INTEREST OR PLEASURE IN DOING THINGS: 0
SUM OF ALL RESPONSES TO PHQ QUESTIONS 1-9: 0
2. FEELING DOWN, DEPRESSED OR HOPELESS: 0
SUM OF ALL RESPONSES TO PHQ QUESTIONS 1-9: 0
SUM OF ALL RESPONSES TO PHQ QUESTIONS 1-9: 0

## 2023-08-18 ENCOUNTER — TELEPHONE (OUTPATIENT)
Age: 78
End: 2023-08-18

## 2023-08-18 NOTE — TELEPHONE ENCOUNTER
Discussed immunizations yesterday - Prevnar 20    Advised patient that Shingrix is covered under his prescription drug plan and he can go to his pharmacy to receive    States understanding

## 2023-08-18 NOTE — TELEPHONE ENCOUNTER
Patient states he needs a call back to discuss Immunizations he received yesterday as he thought he got Shingles Vaccination but not showing on after summary Paperwork. Please call to discuss & advise. Patient states a detailed message can be left on voice mail.   Thank you

## 2023-08-19 LAB
ECHO AO ASC DIAM: 4 CM
ECHO AO ASCENDING AORTA INDEX: 1.94 CM/M2
ECHO AO ROOT DIAM: 4 CM
ECHO AO ROOT INDEX: 1.94 CM/M2
ECHO AV AREA PEAK VELOCITY: 2.5 CM2
ECHO AV AREA VTI: 2.7 CM2
ECHO AV AREA/BSA PEAK VELOCITY: 1.2 CM2/M2
ECHO AV AREA/BSA VTI: 1.3 CM2/M2
ECHO AV MEAN GRADIENT: 2 MMHG
ECHO AV MEAN VELOCITY: 0.7 M/S
ECHO AV PEAK GRADIENT: 4 MMHG
ECHO AV PEAK VELOCITY: 1 M/S
ECHO AV VELOCITY RATIO: 0.8
ECHO AV VTI: 17.9 CM
ECHO BSA: 2.1 M2
ECHO EST RA PRESSURE: 5 MMHG
ECHO LA DIAMETER INDEX: 1.8 CM/M2
ECHO LA DIAMETER: 3.7 CM
ECHO LA TO AORTIC ROOT RATIO: 0.93
ECHO LA VOL 2C: 103 ML (ref 18–58)
ECHO LA VOL 4C: 62 ML (ref 18–58)
ECHO LA VOL BP: 83 ML (ref 18–58)
ECHO LA VOL/BSA BIPLANE: 40 ML/M2 (ref 16–34)
ECHO LA VOLUME AREA LENGTH: 87 ML
ECHO LA VOLUME INDEX A2C: 50 ML/M2 (ref 16–34)
ECHO LA VOLUME INDEX A4C: 30 ML/M2 (ref 16–34)
ECHO LA VOLUME INDEX AREA LENGTH: 42 ML/M2 (ref 16–34)
ECHO LV E' SEPTAL VELOCITY: 10 CM/S
ECHO LV EDV A2C: 120 ML
ECHO LV EDV A4C: 84 ML
ECHO LV EDV BP: 107 ML (ref 67–155)
ECHO LV EDV INDEX A4C: 41 ML/M2
ECHO LV EDV INDEX BP: 52 ML/M2
ECHO LV EDV NDEX A2C: 58 ML/M2
ECHO LV EJECTION FRACTION A2C: 60 %
ECHO LV EJECTION FRACTION A4C: 50 %
ECHO LV EJECTION FRACTION BIPLANE: 58 % (ref 55–100)
ECHO LV ESV A2C: 48 ML
ECHO LV ESV A4C: 42 ML
ECHO LV ESV BP: 45 ML (ref 22–58)
ECHO LV ESV INDEX A2C: 23 ML/M2
ECHO LV ESV INDEX A4C: 20 ML/M2
ECHO LV ESV INDEX BP: 22 ML/M2
ECHO LV FRACTIONAL SHORTENING: 21 % (ref 28–44)
ECHO LV INTERNAL DIMENSION DIASTOLE INDEX: 2.04 CM/M2
ECHO LV INTERNAL DIMENSION DIASTOLIC: 4.2 CM (ref 4.2–5.9)
ECHO LV INTERNAL DIMENSION SYSTOLIC INDEX: 1.6 CM/M2
ECHO LV INTERNAL DIMENSION SYSTOLIC: 3.3 CM
ECHO LV IVSD: 1.2 CM (ref 0.6–1)
ECHO LV MASS 2D: 189.2 G (ref 88–224)
ECHO LV MASS INDEX 2D: 91.8 G/M2 (ref 49–115)
ECHO LV POSTERIOR WALL DIASTOLIC: 1.3 CM (ref 0.6–1)
ECHO LV RELATIVE WALL THICKNESS RATIO: 0.62
ECHO LVOT AREA: 3.1 CM2
ECHO LVOT AV VTI INDEX: 0.85
ECHO LVOT DIAM: 2 CM
ECHO LVOT MEAN GRADIENT: 1 MMHG
ECHO LVOT PEAK GRADIENT: 2 MMHG
ECHO LVOT PEAK VELOCITY: 0.8 M/S
ECHO LVOT STROKE VOLUME INDEX: 23.3 ML/M2
ECHO LVOT SV: 48 ML
ECHO LVOT VTI: 15.3 CM
ECHO MV REGURGITANT VELOCITY PISA: 4.4 M/S
ECHO MV REGURGITANT VTIA: 144.8 CM
ECHO RIGHT VENTRICULAR SYSTOLIC PRESSURE (RVSP): 29 MMHG
ECHO RV INTERNAL DIMENSION: 3.7 CM
ECHO RV TAPSE: 1.8 CM (ref 1.7–?)
ECHO TV REGURGITANT MAX VELOCITY: 2.45 M/S
ECHO TV REGURGITANT PEAK GRADIENT: 24 MMHG

## 2023-08-22 ENCOUNTER — TELEPHONE (OUTPATIENT)
Age: 78
End: 2023-08-22

## 2023-08-22 NOTE — TELEPHONE ENCOUNTER
----- Message from Brianne Noel MD sent at 8/19/2023 10:44 PM EDT -----  echo 8/16/23  Normal LVEF  Mild LVH  Moderate LA enlargement  Mild to moderate MR and TR and mild AR but echo window difficult to visualize the valves well    No BIV pacer upgrade

## 2023-09-06 ENCOUNTER — TELEPHONE (OUTPATIENT)
Age: 78
End: 2023-09-06

## 2023-09-06 NOTE — TELEPHONE ENCOUNTER
Identifiers x 2. Discussed recent echo results that were sent via [address] [place] [address]. Patient unable to see result summary. Verbalized understanding. Confirmed upcoming appts. States plans to go out of town x 1 week. Inquired to taking bedside monitor. Informed that it would be needed if episodes occur and if transmission is requested. Verbalized understanding.

## 2023-10-11 ENCOUNTER — TELEPHONE (OUTPATIENT)
Age: 78
End: 2023-10-11

## 2023-11-15 ENCOUNTER — PROCEDURE VISIT (OUTPATIENT)
Age: 78
End: 2023-11-15

## 2023-11-15 DIAGNOSIS — Z95.0 CARDIAC PACEMAKER IN SITU: Primary | ICD-10-CM

## 2023-12-13 RX ORDER — ATENOLOL 50 MG/1
100 TABLET ORAL DAILY
Qty: 180 TABLET | Refills: 1 | Status: SHIPPED | OUTPATIENT
Start: 2023-12-13

## 2023-12-13 NOTE — TELEPHONE ENCOUNTER
Request for atenolol 50 mg. Last office visit 8/11/23, next office visit 9/5/24.  Refills per verbal order from Jerome Mckeon NP.

## 2024-02-06 ENCOUNTER — HOSPITAL ENCOUNTER (OUTPATIENT)
Facility: HOSPITAL | Age: 79
Discharge: HOME | End: 2024-02-09
Payer: MEDICARE

## 2024-02-06 DIAGNOSIS — Z51.11 ENCOUNTER FOR ANTINEOPLASTIC CHEMOTHERAPY: ICD-10-CM

## 2024-02-06 DIAGNOSIS — T45.1X5A AGRANULOCYTOSIS SECONDARY TO CANCER CHEMOTHERAPY (HCC): ICD-10-CM

## 2024-02-06 DIAGNOSIS — C18.7 MALIGNANT NEOPLASM OF SIGMOID COLON (HCC): ICD-10-CM

## 2024-02-06 DIAGNOSIS — D70.1 AGRANULOCYTOSIS SECONDARY TO CANCER CHEMOTHERAPY (HCC): ICD-10-CM

## 2024-02-06 DIAGNOSIS — J22 UNSPECIFIED ACUTE LOWER RESPIRATORY INFECTION: ICD-10-CM

## 2024-02-06 LAB — CREAT BLD-MCNC: 0.8 MG/DL (ref 0.6–1.3)

## 2024-02-06 PROCEDURE — 82565 ASSAY OF CREATININE: CPT

## 2024-02-06 PROCEDURE — 6360000004 HC RX CONTRAST MEDICATION: Performed by: INTERNAL MEDICINE

## 2024-02-06 PROCEDURE — 74177 CT ABD & PELVIS W/CONTRAST: CPT

## 2024-02-06 RX ADMIN — IOPAMIDOL 100 ML: 755 INJECTION, SOLUTION INTRAVENOUS at 09:44

## 2024-02-14 ENCOUNTER — PROCEDURE VISIT (OUTPATIENT)
Age: 79
End: 2024-02-14

## 2024-02-14 DIAGNOSIS — Z95.0 PRESENCE OF CARDIAC PACEMAKER: Primary | ICD-10-CM

## 2024-02-27 ENCOUNTER — PREP FOR PROCEDURE (OUTPATIENT)
Age: 79
End: 2024-02-27

## 2024-02-27 ENCOUNTER — OFFICE VISIT (OUTPATIENT)
Age: 79
End: 2024-02-27
Payer: MEDICARE

## 2024-02-27 VITALS
HEIGHT: 67 IN | OXYGEN SATURATION: 97 % | WEIGHT: 210.4 LBS | TEMPERATURE: 97.5 F | BODY MASS INDEX: 33.02 KG/M2 | SYSTOLIC BLOOD PRESSURE: 115 MMHG | RESPIRATION RATE: 16 BRPM | HEART RATE: 85 BPM | DIASTOLIC BLOOD PRESSURE: 80 MMHG

## 2024-02-27 DIAGNOSIS — D37.3 APPENDICEAL TUMOR: Primary | ICD-10-CM

## 2024-02-27 PROCEDURE — 1036F TOBACCO NON-USER: CPT | Performed by: SURGERY

## 2024-02-27 PROCEDURE — G8427 DOCREV CUR MEDS BY ELIG CLIN: HCPCS | Performed by: SURGERY

## 2024-02-27 PROCEDURE — 99205 OFFICE O/P NEW HI 60 MIN: CPT | Performed by: SURGERY

## 2024-02-27 PROCEDURE — G8484 FLU IMMUNIZE NO ADMIN: HCPCS | Performed by: SURGERY

## 2024-02-27 PROCEDURE — 1123F ACP DISCUSS/DSCN MKR DOCD: CPT | Performed by: SURGERY

## 2024-02-27 PROCEDURE — G8417 CALC BMI ABV UP PARAM F/U: HCPCS | Performed by: SURGERY

## 2024-02-27 RX ORDER — ACETAMINOPHEN 650 MG/1
650 SUPPOSITORY RECTAL EVERY 4 HOURS PRN
COMMUNITY

## 2024-02-27 ASSESSMENT — PATIENT HEALTH QUESTIONNAIRE - PHQ9
SUM OF ALL RESPONSES TO PHQ QUESTIONS 1-9: 0
2. FEELING DOWN, DEPRESSED OR HOPELESS: 0
SUM OF ALL RESPONSES TO PHQ QUESTIONS 1-9: 0
SUM OF ALL RESPONSES TO PHQ QUESTIONS 1-9: 0
SUM OF ALL RESPONSES TO PHQ9 QUESTIONS 1 & 2: 0
1. LITTLE INTEREST OR PLEASURE IN DOING THINGS: 0
SUM OF ALL RESPONSES TO PHQ QUESTIONS 1-9: 0

## 2024-02-27 ASSESSMENT — ENCOUNTER SYMPTOMS
WHEEZING: 0
SHORTNESS OF BREATH: 0
SORE THROAT: 0
BLOOD IN STOOL: 0
DIARRHEA: 0
EYE PAIN: 0
BACK PAIN: 0
ABDOMINAL PAIN: 0
STRIDOR: 0
VOMITING: 0
COUGH: 0
CONSTIPATION: 0
NAUSEA: 0

## 2024-02-27 NOTE — PROGRESS NOTES
Identified pt with two pt identifiers(name and ). Reviewed record in preparation for visit and have obtained necessary documentation. All patient medications has been reviewed.    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       Health Maintenance Due   Topic    DTaP/Tdap/Td vaccine (1 - Tdap)    Shingles vaccine (1 of 2)    Respiratory Syncytial Virus (RSV) Pregnant or age 60 yrs+ (1 - 1-dose 60+ series)    Flu vaccine (1)    COVID-19 Vaccine ( -  season)       Vitals:    24 0937   BP: 115/80   Site: Right Upper Arm   Position: Sitting   Cuff Size: Large Adult   Pulse: 85   Resp: 16   Temp: 97.5 °F (36.4 °C)   TempSrc: Oral   SpO2: 97%   Weight: 95.4 kg (210 lb 6.4 oz)   Height: 1.702 m (5' 7\")        Pain Score:   0 - No pain         4.Have you been to the ER, urgent care clinic since your last visit?  Hospitalized since your last visit? no    5. Have you seen or consulted any other health care providers outside of the [facility] since your last visit?  Include any pap smears or colon screening. yes Dr. Diana Gr and Dr. Woodard.    Patient is accompanied by wife. I have received verbal consent from Chandrakant Funez to discuss any/all medical information while they are present in the room.  
injury/bowel leaks, need for additional therapy, ureter injury, DVT/PE, cardiac/CNS/pulmonary complications  Hx colon cancer.  Due for colonoscopy later this year.   I spoke with Dr Bravo who would be willing to perform a colonoscopy the day prior to surgery  Atrial fibrillation and pacemaker.  Followed by Dr Woodard.   Will need to be able to come off Eliquis x 48 hours  Essential hypertension.  Controlled on rx      He desires a robotic assisted laparoscopic appendectomy, possible right colectomy under general anesthesia as an outpatient with overnight stay with [facility]    Tentatively plan for 3/21 with colonoscopy by Dr Bravo on 3/20    Will have my nurse reach out to Dr Woodard to see if it is ok to come off Eliquis for surgery      Kian Horner MD FACS

## 2024-02-27 NOTE — H&P (VIEW-ONLY)
tablet Take 2 tablets by mouth daily    apixaban (ELIQUIS) 5 MG TABS tablet Take 1 tablet by mouth 2 times daily    ferrous sulfate (IRON 325) 325 (65 Fe) MG tablet Take by mouth daily     No current facility-administered medications for this visit.     No Known Allergies      Review of Systems   Constitutional:  Negative for chills, diaphoresis, fatigue, fever and unexpected weight change.   HENT:  Negative for congestion, ear pain and sore throat.    Eyes:  Negative for pain.   Respiratory:  Negative for cough, shortness of breath, wheezing and stridor.    Cardiovascular:  Positive for palpitations. Negative for chest pain and leg swelling.   Gastrointestinal:  Negative for abdominal pain, blood in stool, constipation, diarrhea, nausea and vomiting.   Endocrine: Negative for polydipsia.   Genitourinary:  Negative for dysuria, flank pain, frequency, hematuria and urgency.   Musculoskeletal:  Negative for arthralgias, back pain, gait problem and myalgias.   Neurological:  Negative for dizziness, seizures, weakness, numbness and headaches.   Psychiatric/Behavioral:  Negative for behavioral problems. The patient is not nervous/anxious.          /80 (Site: Right Upper Arm, Position: Sitting, Cuff Size: Large Adult)   Pulse 85   Temp 97.5 °F (36.4 °C) (Oral)   Resp 16   Ht 1.702 m (5' 7\")   Wt 95.4 kg (210 lb 6.4 oz)   SpO2 97%   BMI 32.95 kg/m²     Objective:   Physical Exam  Vitals and nursing note reviewed.   Constitutional:       General: He is not in acute distress.     Appearance: Normal appearance. He is well-developed. He is not ill-appearing or diaphoretic.   HENT:      Head: Normocephalic and atraumatic.   Eyes:      General: No scleral icterus.     Extraocular Movements: Extraocular movements intact.      Conjunctiva/sclera: Conjunctivae normal.      Pupils: Pupils are equal, round, and reactive to light.   Neck:      Thyroid: No thyroid mass or thyromegaly.      Trachea: Trachea and phonation

## 2024-02-29 ENCOUNTER — TELEPHONE (OUTPATIENT)
Age: 79
End: 2024-02-29

## 2024-03-01 ENCOUNTER — TELEPHONE (OUTPATIENT)
Age: 79
End: 2024-03-01

## 2024-03-01 NOTE — TELEPHONE ENCOUNTER
Faxed cardiac clearance for upcoming appendectomy/colectomy to [facility] at fax number [phone].  Fax confirmation received.

## 2024-03-04 ENCOUNTER — TELEPHONE (OUTPATIENT)
Age: 79
End: 2024-03-04

## 2024-03-04 NOTE — TELEPHONE ENCOUNTER
Cardiac clearance received from [facility] Tripathi. Patient may hold Eliquis x 2 days prior to planned surgery if requested to do so by the surgeon. He is low risk for significant cardiac complication related to upcoming surgery based on last clinic visit in 8/23. No further cardiac testing is required prior to procedure.  Faxed to [facility]

## 2024-03-07 ENCOUNTER — TELEPHONE (OUTPATIENT)
Age: 79
End: 2024-03-07

## 2024-03-07 NOTE — TELEPHONE ENCOUNTER
Patient called requesting we fax request to hold eloquis fax request cardiologist Mark Woodard,   Fax

## 2024-03-08 ENCOUNTER — CLINICAL DOCUMENTATION (OUTPATIENT)
Age: 79
End: 2024-03-08

## 2024-03-08 NOTE — PROGRESS NOTES
Faxed pre procedure cardiovascular risk assessment to [facility] at fax number [phone].  Fax confirmation received.

## 2024-03-11 ENCOUNTER — TELEPHONE (OUTPATIENT)
Age: 79
End: 2024-03-11

## 2024-03-11 NOTE — TELEPHONE ENCOUNTER
Patient called for refill of eloquis sent through [facility].    [facility] # [phone]

## 2024-03-11 NOTE — TELEPHONE ENCOUNTER
VO per MD    Future Appointments   Date Time Provider Department Center   3/13/2024  1:00 PM [facility] PAT ROOM P4 [facility] PAT RI OR Pre As   4/5/2024  9:20 AM Kian Horner MD [facility] BS AMB   5/16/2024 10:30 AM Bubba Michelle MD [facility] BS AMB   9/5/2024 11:00 AM JOBY GREENE BS AMB   9/5/2024 11:20 AM Mark Woodard MD [facility] BS AMB

## 2024-03-13 ENCOUNTER — HOSPITAL ENCOUNTER (OUTPATIENT)
Facility: HOSPITAL | Age: 79
Discharge: HOME | End: 2024-03-16
Payer: MEDICARE

## 2024-03-13 ENCOUNTER — TELEPHONE (OUTPATIENT)
Age: 79
End: 2024-03-13

## 2024-03-13 VITALS
WEIGHT: 205.69 LBS | HEIGHT: 67 IN | OXYGEN SATURATION: 100 % | SYSTOLIC BLOOD PRESSURE: 120 MMHG | TEMPERATURE: 98.8 F | BODY MASS INDEX: 32.28 KG/M2 | HEART RATE: 81 BPM | RESPIRATION RATE: 16 BRPM | DIASTOLIC BLOOD PRESSURE: 71 MMHG

## 2024-03-13 LAB
ABO + RH BLD: NORMAL
ALBUMIN SERPL-MCNC: 3.9 G/DL (ref 3.5–5)
ALBUMIN/GLOB SERPL: 1.1 (ref 1.1–2.2)
ALP SERPL-CCNC: 71 U/L (ref 45–117)
ALT SERPL-CCNC: 24 U/L (ref 12–78)
ANION GAP SERPL CALC-SCNC: 6 MMOL/L (ref 5–15)
APPEARANCE UR: CLEAR
APTT PPP: 32.5 SEC (ref 22.1–31)
AST SERPL-CCNC: 24 U/L (ref 15–37)
BACTERIA URNS QL MICRO: NEGATIVE /HPF
BILIRUB SERPL-MCNC: 1.4 MG/DL (ref 0.2–1)
BILIRUB UR QL: NEGATIVE
BLOOD GROUP ANTIBODIES SERPL: NORMAL
BUN SERPL-MCNC: 22 MG/DL (ref 6–20)
BUN/CREAT SERPL: 24 (ref 12–20)
CALCIUM SERPL-MCNC: 9.3 MG/DL (ref 8.5–10.1)
CHLORIDE SERPL-SCNC: 102 MMOL/L (ref 97–108)
CO2 SERPL-SCNC: 25 MMOL/L (ref 21–32)
COLOR UR: ABNORMAL
CREAT SERPL-MCNC: 0.9 MG/DL (ref 0.7–1.3)
EKG ATRIAL RATE: 42 BPM
EKG DIAGNOSIS: NORMAL
EKG Q-T INTERVAL: 466 MS
EKG QRS DURATION: 142 MS
EKG QTC CALCULATION (BAZETT): 544 MS
EKG R AXIS: 37 DEGREES
EKG T AXIS: 80 DEGREES
EKG VENTRICULAR RATE: 82 BPM
EPITH CASTS URNS QL MICRO: ABNORMAL /LPF
ERYTHROCYTE [DISTWIDTH] IN BLOOD BY AUTOMATED COUNT: 12.4 % (ref 11.5–14.5)
EST. AVERAGE GLUCOSE BLD GHB EST-MCNC: 97 MG/DL
GLOBULIN SER CALC-MCNC: 3.4 G/DL (ref 2–4)
GLUCOSE SERPL-MCNC: 109 MG/DL (ref 65–100)
GLUCOSE UR STRIP.AUTO-MCNC: NEGATIVE MG/DL
HBA1C MFR BLD: 5 % (ref 4–5.6)
HCT VFR BLD AUTO: 42.1 % (ref 36.6–50.3)
HGB BLD-MCNC: 13.8 G/DL (ref 12.1–17)
HGB UR QL STRIP: ABNORMAL
HYALINE CASTS URNS QL MICRO: ABNORMAL /LPF (ref 0–2)
INR PPP: 1.2 (ref 0.9–1.1)
KETONES UR QL STRIP.AUTO: ABNORMAL MG/DL
LEUKOCYTE ESTERASE UR QL STRIP.AUTO: ABNORMAL
MAGNESIUM SERPL-MCNC: 1.8 MG/DL (ref 1.6–2.4)
MCH RBC QN AUTO: 30.8 PG (ref 26–34)
MCHC RBC AUTO-ENTMCNC: 32.8 G/DL (ref 30–36.5)
MCV RBC AUTO: 94 FL (ref 80–99)
NITRITE UR QL STRIP.AUTO: NEGATIVE
NRBC # BLD: 0 K/UL (ref 0–0.01)
NRBC BLD-RTO: 0 PER 100 WBC
PH UR STRIP: 5.5 (ref 5–8)
PHOSPHATE SERPL-MCNC: 3.6 MG/DL (ref 2.6–4.7)
PLATELET # BLD AUTO: 135 K/UL (ref 150–400)
PMV BLD AUTO: 11.1 FL (ref 8.9–12.9)
POTASSIUM SERPL-SCNC: 4.4 MMOL/L (ref 3.5–5.1)
PROT SERPL-MCNC: 7.3 G/DL (ref 6.4–8.2)
PROT UR STRIP-MCNC: NEGATIVE MG/DL
PROTHROMBIN TIME: 12.3 SEC (ref 9–11.1)
RBC # BLD AUTO: 4.48 M/UL (ref 4.1–5.7)
RBC #/AREA URNS HPF: ABNORMAL /HPF (ref 0–5)
SODIUM SERPL-SCNC: 133 MMOL/L (ref 136–145)
SP GR UR REFRACTOMETRY: 1.01
SPECIMEN EXP DATE BLD: NORMAL
THERAPEUTIC RANGE: ABNORMAL SECS (ref 58–77)
URINE CULTURE IF INDICATED: ABNORMAL
UROBILINOGEN UR QL STRIP.AUTO: 0.2 EU/DL (ref 0.2–1)
WBC # BLD AUTO: 7.1 K/UL (ref 4.1–11.1)
WBC URNS QL MICRO: ABNORMAL /HPF (ref 0–4)

## 2024-03-13 PROCEDURE — 83036 HEMOGLOBIN GLYCOSYLATED A1C: CPT

## 2024-03-13 PROCEDURE — 80053 COMPREHEN METABOLIC PANEL: CPT

## 2024-03-13 PROCEDURE — 86900 BLOOD TYPING SEROLOGIC ABO: CPT

## 2024-03-13 PROCEDURE — 83735 ASSAY OF MAGNESIUM: CPT

## 2024-03-13 PROCEDURE — 86901 BLOOD TYPING SEROLOGIC RH(D): CPT

## 2024-03-13 PROCEDURE — 86850 RBC ANTIBODY SCREEN: CPT

## 2024-03-13 PROCEDURE — 85730 THROMBOPLASTIN TIME PARTIAL: CPT

## 2024-03-13 PROCEDURE — 85610 PROTHROMBIN TIME: CPT

## 2024-03-13 PROCEDURE — 85027 COMPLETE CBC AUTOMATED: CPT

## 2024-03-13 PROCEDURE — 81001 URINALYSIS AUTO W/SCOPE: CPT

## 2024-03-13 PROCEDURE — 36415 COLL VENOUS BLD VENIPUNCTURE: CPT

## 2024-03-13 PROCEDURE — 84100 ASSAY OF PHOSPHORUS: CPT

## 2024-03-13 RX ORDER — SODIUM CHLORIDE, SODIUM LACTATE, POTASSIUM CHLORIDE, CALCIUM CHLORIDE 600; 310; 30; 20 MG/100ML; MG/100ML; MG/100ML; MG/100ML
INJECTION, SOLUTION INTRAVENOUS CONTINUOUS
OUTPATIENT
Start: 2024-03-21

## 2024-03-13 RX ORDER — CELECOXIB 200 MG/1
200 CAPSULE ORAL ONCE
OUTPATIENT
Start: 2024-03-21

## 2024-03-13 RX ORDER — ACETAMINOPHEN 325 MG/1
650 TABLET ORAL ONCE
OUTPATIENT
Start: 2024-03-21

## 2024-03-13 ASSESSMENT — PAIN SCALES - GENERAL: PAINLEVEL_OUTOF10: 0

## 2024-03-13 NOTE — PROGRESS NOTES

## 2024-03-13 NOTE — PROGRESS NOTES
The [facility] Enhanced Recovery Program (ERAS) book was reviewed with the patient during the pre-admission testing (PAT) appointment. An opportunity for questions was provided, patient verbalized understanding.        Start ensure surgery immuno-nutrition shakes on date March 15        twice a day through date March 19  Follow colonoscopy prep instructions (pt reports he has received prep instructions)on March 20    Take metoclopramide at 6 am, 12 noon, and 5 pm the day before surgery  Take metronidazole at 8 am, 9 am, and 5 pm the day before surgery  Take neomycin at 8 am, 9 am, and 5 pm the day before surgery   Take presurgery drink one bottle the night before surgery before midnight and drink the second bottle 1 hour before time of arrival to the hospital   Start incentive spirimetry on March 13 and practice twice daily- 10 times each and bring incentive spirometer with you the day of surgery     Cxr not done at PAT visit- had CT of chest done 2/6/24

## 2024-03-13 NOTE — PROGRESS NOTES

## 2024-03-13 NOTE — TELEPHONE ENCOUNTER
Saul from [facility] called to speak to nurse about verifying medication day before surgery, and has used last book for ERES    Please c/b

## 2024-03-13 NOTE — PROGRESS NOTES
[facility]  Preoperative Instructions    Surgery Date March 21          Time of Arrival to be called  Contact#[phone]     1. On the day of your surgery, please report to the Surgical Services Registration Desk and sign in at your designated time. The Surgery Center is located to the right of the Emergency Room.     2. You must have someone with you to drive you home. You should not drive a car for 24 hours following surgery. Please make arrangements for a friend or family member to stay with you for the first 24 hours after your surgery.    3. Refer to your handbook for instructions on drinking liquids prior to surgery.    4. We recommend you do not drink any alcoholic beverages for 24 hours before and after your surgery.    5. Contact your surgeon’s office for instructions on the following medications: non-steroidal anti-inflammatory drugs (i.e. Advil, Aleve), vitamins, and supplements. (Some surgeon’s will want you to stop these medications prior to surgery and others may allow you to take them)  **If you are currently taking Plavix, Coumadin, Aspirin and/or other blood-thinning agents, contact your surgeon for instructions.** Your surgeon will partner with the physician prescribing these medications to determine if it is safe to stop or if you need to continue taking.  Please do not stop taking these medications without instructions from your surgeon    6. Wear comfortable clothes.  Wear glasses instead of contacts.  Do not bring any money or jewelry. Please bring picture ID, insurance card, and any prearranged co-payment or hospital payment.  Do not wear make-up, particularly mascara the morning of your surgery.  Do not wear nail polish, particularly if you are having foot /hand surgery.  Wear your hair loose or down, no ponytails, buns, sirisha pins or clips.  All body piercings must be removed.  Please shower with antibacterial soap for three consecutive days before and on the

## 2024-03-14 ENCOUNTER — TELEPHONE (OUTPATIENT)
Age: 79
End: 2024-03-14

## 2024-03-14 LAB
BACTERIA SPEC CULT: NORMAL
BACTERIA SPEC CULT: NORMAL
SERVICE CMNT-IMP: NORMAL

## 2024-03-15 ENCOUNTER — TELEPHONE (OUTPATIENT)
Age: 79
End: 2024-03-15

## 2024-03-15 RX ORDER — ERYTHROMYCIN 500 MG/1
500 TABLET, COATED ORAL 3 TIMES DAILY
Qty: 3 TABLET | Refills: 0 | Status: SHIPPED | OUTPATIENT
Start: 2024-03-15 | End: 2024-03-25

## 2024-03-15 RX ORDER — NEOMYCIN SULFATE 500 MG/1
1000 TABLET ORAL 4 TIMES DAILY
Qty: 6 TABLET | Refills: 0 | Status: SHIPPED | OUTPATIENT
Start: 2024-03-15 | End: 2024-03-16

## 2024-03-15 RX ORDER — METOCLOPRAMIDE 5 MG/1
5 TABLET ORAL 3 TIMES DAILY
Qty: 3 TABLET | Refills: 0 | Status: SHIPPED | OUTPATIENT
Start: 2024-03-15

## 2024-03-15 NOTE — TELEPHONE ENCOUNTER
Saul from PAT called in regards to patient has not heard from pharmacy with ERES medication also has colonoscopy day before surgery making sure bowel prep is not repeated please advise    [phone] saul

## 2024-03-18 ENCOUNTER — TELEPHONE (OUTPATIENT)
Age: 79
End: 2024-03-18

## 2024-03-18 NOTE — TELEPHONE ENCOUNTER
Patient requesting clarification on pre-op meds. Was prescribed, E-mycin, Reglan and Neomycin. Flagyl wasn't prescribed but is on the ERAS instructions. Does this need to be added to the prescribed meds? If so, does he discontinue the E-mycin which is not present on the instructions. Thank you!    Spoke with pharmacy. [facility] pharmacy stated Xyrem medication was denied yesterday. Per Didi SHARMA, our office received a notification yesterday that the medication was denied and the PA was resubmitted to [facility] yesterday. Will await reply from Luis.

## 2024-03-19 ENCOUNTER — ANESTHESIA EVENT (OUTPATIENT)
Facility: HOSPITAL | Age: 79
End: 2024-03-19
Payer: MEDICARE

## 2024-03-19 ASSESSMENT — ENCOUNTER SYMPTOMS: SHORTNESS OF BREATH: 1

## 2024-03-19 NOTE — ANESTHESIA PRE PROCEDURE
\"LABABO\", \"LABRH\"    Drug/Infectious Status (If Applicable):  No results found for: \"HIV\", \"HEPCAB\"    COVID-19 Screening (If Applicable):   Lab Results   Component Value Date/Time    COVID19 Not detected 12/10/2021 06:32 AM           Anesthesia Evaluation  Patient summary reviewed and Nursing notes reviewed  Airway: Mallampati: III  TM distance: >3 FB   Neck ROM: limited  Mouth opening: > = 3 FB   Dental:    (+) poor dentition and upper dentures      Pulmonary:   (+)   shortness of breath: no interval change,     decreased breath sounds: bibasilar                               Cardiovascular:  Exercise tolerance: poor (<4 METS)  (+) hypertension:, valvular problems/murmurs: AI and MR, pacemaker:, dysrhythmias: atrial fibrillation and paced rhythm      ECG reviewed  Rhythm: regular  Rate: normal  Echocardiogram reviewed               ROS comment: 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   Endo/Other:    (+) blood dyscrasia: anemia and thrombocytopenia:..                 Abdominal:   (+) obese          Vascular:   + PVD, aortic or cerebral.        ROS comment: Dilated ascending aorta. Other Findings:       Anesthesia Plan      general and TIVA     ASA 4       Induction: intravenous.  continuous noninvasive hemodynamic monitor    Anesthetic plan and risks discussed with patient.    Use of blood products discussed with patient whom.    Plan discussed with CRNA.                ROSANA Walsh MD   3/19/2024

## 2024-03-20 ENCOUNTER — HOSPITAL ENCOUNTER (OUTPATIENT)
Facility: HOSPITAL | Age: 79
Setting detail: OUTPATIENT SURGERY
Discharge: HOME | DRG: 399 | End: 2024-03-20
Attending: INTERNAL MEDICINE | Admitting: INTERNAL MEDICINE
Payer: MEDICARE

## 2024-03-20 ENCOUNTER — ANESTHESIA (OUTPATIENT)
Facility: HOSPITAL | Age: 79
End: 2024-03-20
Payer: MEDICARE

## 2024-03-20 VITALS
HEIGHT: 67 IN | WEIGHT: 204 LBS | HEART RATE: 80 BPM | DIASTOLIC BLOOD PRESSURE: 76 MMHG | BODY MASS INDEX: 32.02 KG/M2 | OXYGEN SATURATION: 99 % | RESPIRATION RATE: 19 BRPM | SYSTOLIC BLOOD PRESSURE: 128 MMHG | TEMPERATURE: 97.2 F

## 2024-03-20 PROCEDURE — 2580000003 HC RX 258: Performed by: INTERNAL MEDICINE

## 2024-03-20 PROCEDURE — 7100000011 HC PHASE II RECOVERY - ADDTL 15 MIN: Performed by: INTERNAL MEDICINE

## 2024-03-20 PROCEDURE — 3700000000 HC ANESTHESIA ATTENDED CARE: Performed by: INTERNAL MEDICINE

## 2024-03-20 PROCEDURE — 0DTJ4ZZ RESECTION OF APPENDIX, PERCUTANEOUS ENDOSCOPIC APPROACH: ICD-10-PCS | Performed by: INTERNAL MEDICINE

## 2024-03-20 PROCEDURE — 2709999900 HC NON-CHARGEABLE SUPPLY: Performed by: INTERNAL MEDICINE

## 2024-03-20 PROCEDURE — 6360000002 HC RX W HCPCS: Performed by: ANESTHESIOLOGY

## 2024-03-20 PROCEDURE — 7100000010 HC PHASE II RECOVERY - FIRST 15 MIN: Performed by: INTERNAL MEDICINE

## 2024-03-20 PROCEDURE — 3600007512: Performed by: INTERNAL MEDICINE

## 2024-03-20 PROCEDURE — 3600007502: Performed by: INTERNAL MEDICINE

## 2024-03-20 PROCEDURE — 0DJD8ZZ INSPECTION OF LOWER INTESTINAL TRACT, VIA NATURAL OR ARTIFICIAL OPENING ENDOSCOPIC: ICD-10-PCS | Performed by: INTERNAL MEDICINE

## 2024-03-20 PROCEDURE — 2500000003 HC RX 250 WO HCPCS: Performed by: ANESTHESIOLOGY

## 2024-03-20 PROCEDURE — 2720000010 HC SURG SUPPLY STERILE: Performed by: INTERNAL MEDICINE

## 2024-03-20 PROCEDURE — 3700000001 HC ADD 15 MINUTES (ANESTHESIA): Performed by: INTERNAL MEDICINE

## 2024-03-20 RX ORDER — SODIUM CHLORIDE 0.9 % (FLUSH) 0.9 %
5-40 SYRINGE (ML) INJECTION PRN
Status: DISCONTINUED | OUTPATIENT
Start: 2024-03-20 | End: 2024-03-20 | Stop reason: HOSPADM

## 2024-03-20 RX ORDER — LIDOCAINE HYDROCHLORIDE 20 MG/ML
INJECTION, SOLUTION EPIDURAL; INFILTRATION; INTRACAUDAL; PERINEURAL PRN
Status: DISCONTINUED | OUTPATIENT
Start: 2024-03-20 | End: 2024-03-20 | Stop reason: SDUPTHER

## 2024-03-20 RX ORDER — SODIUM CHLORIDE 9 MG/ML
25 INJECTION, SOLUTION INTRAVENOUS PRN
Status: DISCONTINUED | OUTPATIENT
Start: 2024-03-20 | End: 2024-03-20 | Stop reason: HOSPADM

## 2024-03-20 RX ORDER — SODIUM CHLORIDE 0.9 % (FLUSH) 0.9 %
5-40 SYRINGE (ML) INJECTION EVERY 12 HOURS SCHEDULED
Status: DISCONTINUED | OUTPATIENT
Start: 2024-03-20 | End: 2024-03-20 | Stop reason: HOSPADM

## 2024-03-20 RX ADMIN — PROPOFOL 120 MG: 10 INJECTION, EMULSION INTRAVENOUS at 11:12

## 2024-03-20 RX ADMIN — SODIUM CHLORIDE 25 ML: 9 INJECTION, SOLUTION INTRAVENOUS at 10:41

## 2024-03-20 RX ADMIN — LIDOCAINE HYDROCHLORIDE 40 MG: 20 INJECTION, SOLUTION EPIDURAL; INFILTRATION; INTRACAUDAL; PERINEURAL at 10:54

## 2024-03-20 ASSESSMENT — PAIN - FUNCTIONAL ASSESSMENT: PAIN_FUNCTIONAL_ASSESSMENT: 0-10

## 2024-03-20 NOTE — DISCHARGE INSTRUCTIONS
Chandrakant Funez  [id number]  1945    COLON DISCHARGE INSTRUCTIONS  Discomfort:  Redness at IV site- apply warm compress to area; if redness or soreness persist- contact your physician  There may be a slight amount of blood passed from the rectum  Gaseous discomfort- walking, belching will help relieve any discomfort  You may not operate a vehicle for 12 hours  You may not engage in an occupation involving machinery or appliances for rest of today  You may not drink alcoholic beverages for at least 12 hours  Avoid making any critical decisions for at least 24 hour  DIET:   CLEAR LIQUID DIET TODAY, then NOTHING after MIDNIGHT.   - however -  remember your colon is empty and a heavy meal will produce gas.   Avoid these foods:  vegetables, fried / greasy foods, carbonated drinks for today  MEDICATION:  (See attached)     ACTIVITY:  You may not resume your normal daily activities until tomorrow AM; it is recommended that you spend the remainder of the day resting -  avoid any strenuous activity.  CALL M.D.  ANY SIGN OF:   Increasing pain, nausea, vomiting  Abdominal distension (swelling)  New increased bleeding (oral or rectal)  Fever (chills)  Pain in chest area  Bloody discharge from nose or mouth  Shortness of breath    Continue to hold anticoagulation, and surgeons will tell you when to restart it    IMPRESSION:  -- healthy and normal prior surgical site  -- normal appendix, as viewed from inside the colon  -- diverticulosis, which is when small out-pouchings in the inner lining of the colon form, little stretched out pockets. This is very common, and a diet high in fiber with the addition of a daily fiber supplement (like 2 teaspoons of metamucil or psyllium husk fiber powder mixed in water) can help treat this!  -- we saw some hemorrhoids, which are very common, and these are swollen veins at the anal canal or end of the rectum, which can bulge with constipation and straining or frequent bowel movements.

## 2024-03-20 NOTE — ANESTHESIA POSTPROCEDURE EVALUATION
Department of Anesthesiology  Postprocedure Note    Patient: Chandrakant Funez  MRN: [id number]  YOB: 1945  Date of evaluation: 3/20/2024    Procedure Summary       Date: 03/20/24 Room / Location: [facility] ENDO 03 / [facility] ENDOSCOPY    Anesthesia Start: 1051 Anesthesia Stop: 1117    Procedure: COLONOSCOPY Diagnosis:       Abnormal findings on examination of gastrointestinal tract      (Abnormal findings on examination of gastrointestinal tract [R93.3])    Surgeons: Laureano Bravo MD Responsible Provider: Carla Luevano DO    Anesthesia Type: general, TIVA ASA Status: 4            Anesthesia Type: No value filed.    Lina Phase I: Lina Score: 10    Lina Phase II: Lina Score: 10    Anesthesia Post Evaluation    Patient location during evaluation: bedside  Patient participation: complete - patient participated  Level of consciousness: responsive to verbal stimuli and awake and alert  Pain score: 2  Nausea & Vomiting: no nausea  Cardiovascular status: blood pressure returned to baseline  Respiratory status: acceptable  Hydration status: euvolemic  Multimodal analgesia pain management approach  Pain management: adequate    No notable events documented.

## 2024-03-20 NOTE — OP NOTE
NAME:  Chandrakant Funez   :   1945   MRN:   [id number]     Date/Time:  3/20/2024 11:15 AM    Colonoscopy Operative Report    Procedure Type:  Colonoscopy --diagnostic     Indications: abnormal CT colon - see report, enlarging appendiceal lesion  Pre-operative Diagnosis: see indication above  Post-operative Diagnosis:  See findings below  :  Laureano Bravo MD  Referring Provider: Bubba Paz MD    Exam:  Airway: clear, no airway problems anticipated  Heart: RRR, without gallops or rubs  Lungs: clear bilaterally without wheezes, crackles, or rhonchi  Abdomen: soft, nontender, nondistended, bowel sounds present  Mental Status: awake, alert and oriented to person, place and time    Sedation:  MAC anesthesia Propofol  Procedure Details:  After informed consent was obtained with all risks and benefits of procedure explained and preoperative exam completed, the patient was taken to the endoscopy suite and placed in the left lateral decubitus position.  Upon sequential sedation as per above, a digital rectal exam was performed demonstrating internal and external hemorrhoids.  The Olympus videocolonoscope  was inserted in the rectum and carefully advanced to the terminal ileum.   The quality of preparation was good.  The colonoscope was slowly withdrawn with careful evaluation between folds. Retroflexion in the rectum was completed demonstrating internal and external hemorrhoids.     Findings:   ANUS: Anal exam reveals no masses but hemorrhoids, sphincter tone is normal.   RECTUM: Rectal exam reveals no masses but hemorrhoids.   SIGMOID COLON: surgically absent, sutures visualized and normal appearing anastomosis.  DESCENDING COLON: Diverticulosis, but otherwise, normal.  TRANSVERSE COLON: The mucosa is normal with good vascular pattern and without ulcers, diverticula, and polyps.   ASCENDING COLON: The mucosa is normal with good vascular pattern and without ulcers, diverticula, and polyps.    CECUM: The appendiceal orifice appears normal. The ileocecal valve appears normal.   TERMINAL ILEUM: The terminal ileum was normal    Specimen Removed:  none  Complications:  None.   EBL:     None.    Impression:  -- status post sigmoidectomy  -- internal hemorrhoids  -- left-sided diverticulosis  -- normal cecum and appendical orifice.    Recommendations:   -- clear liquid diet today  -- NPO p MN for planned surgery tomorrow  -- continue to hold anticoagulation  -- repeat colonoscopy not required for routine screening      Discharge Disposition:  Home in the company of a  when able to ambulate.      Laureano Bravo MD

## 2024-03-20 NOTE — H&P
Gastroenterology Outpatient History and Physical    Patient: Chandrakant Funez    Physician: Laureano Bravo MD    Chief Complaint: abnormal CT  History of Present Illness: 77 yo M with a recent CT showing appendiceal abnormality.    History:  Past Medical History:   Diagnosis Date    Aortic root dilation (HCC)     F/B Dr Woodard    Arrhythmia     a-fib    At risk for sleep apnea     CHERISE 4    Atrial fibrillation (HCC)     F/B Dr Woodard    Cancer (HCC) 03/2021    Colon    Essential hypertension     Frequent urination at night     Swallowing difficulty     with only pills - since childhood    Uncircumcised male     per patient      Past Surgical History:   Procedure Laterality Date    AV NODE ABLATION  06/08/2022    COLONOSCOPY N/A 12/15/2021    COLONOSCOPY performed by Laureano Bravo MD at [facility] ENDOSCOPY    COLONOSCOPY N/A 01/26/2021    COLONOSCOPY performed by Laureano Bravo MD at [facility] ENDOSCOPY    COLONOSCOPY,DIAGNOSTIC  12/15/2021         COLONOSCOPY,REMV LESN,SNARE  01/26/2021         COLONOSCPY,FLEX,W/DIR SUBMUC INJECT  01/26/2021         OTHER SURGICAL HISTORY      Chandan cath    PACEMAKER Left 06/03/2022    SIGMOID COLECTOMY  03/26/2021    Dr. Tipton    TONSILLECTOMY  as a child      Social History     Socioeconomic History    Marital status:      Spouse name: None    Number of children: None    Years of education: None    Highest education level: None   Tobacco Use    Smoking status: Never    Smokeless tobacco: Never   Vaping Use    Vaping Use: Never used   Substance and Sexual Activity    Alcohol use: Not Currently    Drug use: Never     Social Determinants of Health     Financial Resource Strain: Low Risk  (8/17/2023)    Overall Financial Resource Strain (CARDIA)     Difficulty of Paying Living Expenses: Not hard at all   Transportation Needs: Unknown (8/17/2023)    PRAPARE - Transportation     Lack of Transportation (Non-Medical): No   Physical Activity: Inactive (8/17/2023)    Exercise Vital Sign     Days of

## 2024-03-21 ENCOUNTER — ANESTHESIA (OUTPATIENT)
Facility: HOSPITAL | Age: 79
End: 2024-03-21
Payer: MEDICARE

## 2024-03-21 ENCOUNTER — TELEPHONE (OUTPATIENT)
Age: 79
End: 2024-03-21

## 2024-03-21 ENCOUNTER — HOSPITAL ENCOUNTER (INPATIENT)
Facility: HOSPITAL | Age: 79
LOS: 1 days | Discharge: HOME | DRG: 399 | End: 2024-03-22
Attending: SURGERY | Admitting: SURGERY
Payer: MEDICARE

## 2024-03-21 ENCOUNTER — ANESTHESIA EVENT (OUTPATIENT)
Facility: HOSPITAL | Age: 79
End: 2024-03-21
Payer: MEDICARE

## 2024-03-21 DIAGNOSIS — D37.3 APPENDICEAL TUMOR: Primary | ICD-10-CM

## 2024-03-21 PROCEDURE — 3600000009 HC SURGERY ROBOT BASE: Performed by: SURGERY

## 2024-03-21 PROCEDURE — 2500000003 HC RX 250 WO HCPCS: Performed by: SURGERY

## 2024-03-21 PROCEDURE — 3600000019 HC SURGERY ROBOT ADDTL 15MIN: Performed by: SURGERY

## 2024-03-21 PROCEDURE — 6370000000 HC RX 637 (ALT 250 FOR IP): Performed by: SURGERY

## 2024-03-21 PROCEDURE — 88331 PATH CONSLTJ SURG 1 BLK 1SPC: CPT

## 2024-03-21 PROCEDURE — C1713 ANCHOR/SCREW BN/BN,TIS/BN: HCPCS | Performed by: SURGERY

## 2024-03-21 PROCEDURE — 2580000003 HC RX 258: Performed by: SURGERY

## 2024-03-21 PROCEDURE — 7100000000 HC PACU RECOVERY - FIRST 15 MIN: Performed by: SURGERY

## 2024-03-21 PROCEDURE — 8E0W4CZ ROBOTIC ASSISTED PROCEDURE OF TRUNK REGION, PERCUTANEOUS ENDOSCOPIC APPROACH: ICD-10-PCS | Performed by: SURGERY

## 2024-03-21 PROCEDURE — 2709999900 HC NON-CHARGEABLE SUPPLY: Performed by: SURGERY

## 2024-03-21 PROCEDURE — S2900 ROBOTIC SURGICAL SYSTEM: HCPCS | Performed by: SURGERY

## 2024-03-21 PROCEDURE — 6360000002 HC RX W HCPCS: Performed by: ANESTHESIOLOGY

## 2024-03-21 PROCEDURE — 3700000001 HC ADD 15 MINUTES (ANESTHESIA): Performed by: SURGERY

## 2024-03-21 PROCEDURE — 6360000002 HC RX W HCPCS: Performed by: SURGERY

## 2024-03-21 PROCEDURE — 2720000010 HC SURG SUPPLY STERILE: Performed by: SURGERY

## 2024-03-21 PROCEDURE — 2500000003 HC RX 250 WO HCPCS: Performed by: ANESTHESIOLOGY

## 2024-03-21 PROCEDURE — 44970 LAPAROSCOPY APPENDECTOMY: CPT | Performed by: SURGERY

## 2024-03-21 PROCEDURE — 2580000003 HC RX 258: Performed by: ANESTHESIOLOGY

## 2024-03-21 PROCEDURE — 88304 TISSUE EXAM BY PATHOLOGIST: CPT

## 2024-03-21 PROCEDURE — 3700000000 HC ANESTHESIA ATTENDED CARE: Performed by: SURGERY

## 2024-03-21 PROCEDURE — 7100000001 HC PACU RECOVERY - ADDTL 15 MIN: Performed by: SURGERY

## 2024-03-21 PROCEDURE — 1100000000 HC RM PRIVATE

## 2024-03-21 RX ORDER — ACETAMINOPHEN 500 MG
1000 TABLET ORAL ONCE
Status: COMPLETED | OUTPATIENT
Start: 2024-03-21 | End: 2024-03-21

## 2024-03-21 RX ORDER — ATENOLOL 50 MG/1
50 TABLET ORAL 2 TIMES DAILY
Status: DISCONTINUED | OUTPATIENT
Start: 2024-03-21 | End: 2024-03-22 | Stop reason: HOSPADM

## 2024-03-21 RX ORDER — SODIUM CHLORIDE 0.9 % (FLUSH) 0.9 %
5-40 SYRINGE (ML) INJECTION EVERY 12 HOURS SCHEDULED
Status: DISCONTINUED | OUTPATIENT
Start: 2024-03-21 | End: 2024-03-21 | Stop reason: HOSPADM

## 2024-03-21 RX ORDER — SODIUM CHLORIDE 9 MG/ML
INJECTION, SOLUTION INTRAVENOUS PRN
Status: DISCONTINUED | OUTPATIENT
Start: 2024-03-21 | End: 2024-03-21 | Stop reason: HOSPADM

## 2024-03-21 RX ORDER — GLYCOPYRROLATE 0.2 MG/ML
INJECTION INTRAMUSCULAR; INTRAVENOUS PRN
Status: DISCONTINUED | OUTPATIENT
Start: 2024-03-21 | End: 2024-03-21 | Stop reason: SDUPTHER

## 2024-03-21 RX ORDER — KETAMINE HYDROCHLORIDE 10 MG/ML
INJECTION, SOLUTION INTRAMUSCULAR; INTRAVENOUS PRN
Status: DISCONTINUED | OUTPATIENT
Start: 2024-03-21 | End: 2024-03-21 | Stop reason: SDUPTHER

## 2024-03-21 RX ORDER — LIDOCAINE HYDROCHLORIDE 20 MG/ML
INJECTION, SOLUTION EPIDURAL; INFILTRATION; INTRACAUDAL; PERINEURAL PRN
Status: DISCONTINUED | OUTPATIENT
Start: 2024-03-21 | End: 2024-03-21 | Stop reason: SDUPTHER

## 2024-03-21 RX ORDER — NEOSTIGMINE METHYLSULFATE 1 MG/ML
INJECTION, SOLUTION INTRAVENOUS PRN
Status: DISCONTINUED | OUTPATIENT
Start: 2024-03-21 | End: 2024-03-21 | Stop reason: SDUPTHER

## 2024-03-21 RX ORDER — NEOMYCIN SULFATE 500 MG/1
1000 TABLET ORAL 3 TIMES DAILY
Status: ON HOLD | COMMUNITY
End: 2024-03-21

## 2024-03-21 RX ORDER — NALOXONE HYDROCHLORIDE 0.4 MG/ML
INJECTION, SOLUTION INTRAMUSCULAR; INTRAVENOUS; SUBCUTANEOUS PRN
Status: DISCONTINUED | OUTPATIENT
Start: 2024-03-21 | End: 2024-03-21 | Stop reason: HOSPADM

## 2024-03-21 RX ORDER — SODIUM CHLORIDE 0.9 % (FLUSH) 0.9 %
5-40 SYRINGE (ML) INJECTION PRN
Status: DISCONTINUED | OUTPATIENT
Start: 2024-03-21 | End: 2024-03-22 | Stop reason: HOSPADM

## 2024-03-21 RX ORDER — PROCHLORPERAZINE EDISYLATE 5 MG/ML
5 INJECTION INTRAMUSCULAR; INTRAVENOUS
Status: DISCONTINUED | OUTPATIENT
Start: 2024-03-21 | End: 2024-03-21 | Stop reason: HOSPADM

## 2024-03-21 RX ORDER — DEXTROSE MONOHYDRATE, SODIUM CHLORIDE, AND POTASSIUM CHLORIDE 50; 1.49; 4.5 G/1000ML; G/1000ML; G/1000ML
INJECTION, SOLUTION INTRAVENOUS CONTINUOUS
Status: DISCONTINUED | OUTPATIENT
Start: 2024-03-21 | End: 2024-03-22

## 2024-03-21 RX ORDER — ONDANSETRON 4 MG/1
4 TABLET, ORALLY DISINTEGRATING ORAL EVERY 8 HOURS PRN
Status: DISCONTINUED | OUTPATIENT
Start: 2024-03-21 | End: 2024-03-22 | Stop reason: HOSPADM

## 2024-03-21 RX ORDER — OXYCODONE HYDROCHLORIDE 5 MG/1
5 TABLET ORAL EVERY 6 HOURS PRN
Qty: 12 TABLET | Refills: 0 | Status: SHIPPED | OUTPATIENT
Start: 2024-03-21 | End: 2024-03-24

## 2024-03-21 RX ORDER — CELECOXIB 200 MG/1
200 CAPSULE ORAL ONCE
Status: DISCONTINUED | OUTPATIENT
Start: 2024-03-21 | End: 2024-03-21 | Stop reason: HOSPADM

## 2024-03-21 RX ORDER — OXYCODONE HYDROCHLORIDE 5 MG/1
5 TABLET ORAL EVERY 4 HOURS PRN
Status: DISCONTINUED | OUTPATIENT
Start: 2024-03-21 | End: 2024-03-22 | Stop reason: HOSPADM

## 2024-03-21 RX ORDER — ENOXAPARIN SODIUM 100 MG/ML
40 INJECTION SUBCUTANEOUS DAILY
Status: DISCONTINUED | OUTPATIENT
Start: 2024-03-22 | End: 2024-03-22 | Stop reason: HOSPADM

## 2024-03-21 RX ORDER — ONDANSETRON 2 MG/ML
4 INJECTION INTRAMUSCULAR; INTRAVENOUS EVERY 6 HOURS PRN
Status: DISCONTINUED | OUTPATIENT
Start: 2024-03-21 | End: 2024-03-22 | Stop reason: HOSPADM

## 2024-03-21 RX ORDER — HYDROMORPHONE HYDROCHLORIDE 1 MG/ML
0.25 INJECTION, SOLUTION INTRAMUSCULAR; INTRAVENOUS; SUBCUTANEOUS
Status: DISCONTINUED | OUTPATIENT
Start: 2024-03-21 | End: 2024-03-22 | Stop reason: HOSPADM

## 2024-03-21 RX ORDER — ACETAMINOPHEN 325 MG/1
650 TABLET ORAL EVERY 4 HOURS PRN
Status: DISCONTINUED | OUTPATIENT
Start: 2024-03-21 | End: 2024-03-22 | Stop reason: HOSPADM

## 2024-03-21 RX ORDER — SODIUM CHLORIDE, SODIUM LACTATE, POTASSIUM CHLORIDE, CALCIUM CHLORIDE 600; 310; 30; 20 MG/100ML; MG/100ML; MG/100ML; MG/100ML
INJECTION, SOLUTION INTRAVENOUS CONTINUOUS PRN
Status: DISCONTINUED | OUTPATIENT
Start: 2024-03-21 | End: 2024-03-21 | Stop reason: SDUPTHER

## 2024-03-21 RX ORDER — ONDANSETRON 2 MG/ML
INJECTION INTRAMUSCULAR; INTRAVENOUS PRN
Status: DISCONTINUED | OUTPATIENT
Start: 2024-03-21 | End: 2024-03-21 | Stop reason: SDUPTHER

## 2024-03-21 RX ORDER — HYDROMORPHONE HYDROCHLORIDE 1 MG/ML
0.25 INJECTION, SOLUTION INTRAMUSCULAR; INTRAVENOUS; SUBCUTANEOUS EVERY 5 MIN PRN
Status: DISCONTINUED | OUTPATIENT
Start: 2024-03-21 | End: 2024-03-21 | Stop reason: HOSPADM

## 2024-03-21 RX ORDER — BUPIVACAINE HYDROCHLORIDE 5 MG/ML
INJECTION, SOLUTION EPIDURAL; INTRACAUDAL PRN
Status: DISCONTINUED | OUTPATIENT
Start: 2024-03-21 | End: 2024-03-21 | Stop reason: ALTCHOICE

## 2024-03-21 RX ORDER — MIDAZOLAM HYDROCHLORIDE 1 MG/ML
INJECTION INTRAMUSCULAR; INTRAVENOUS PRN
Status: DISCONTINUED | OUTPATIENT
Start: 2024-03-21 | End: 2024-03-21 | Stop reason: SDUPTHER

## 2024-03-21 RX ORDER — SODIUM CHLORIDE 0.9 % (FLUSH) 0.9 %
5-40 SYRINGE (ML) INJECTION EVERY 12 HOURS SCHEDULED
Status: DISCONTINUED | OUTPATIENT
Start: 2024-03-21 | End: 2024-03-22 | Stop reason: HOSPADM

## 2024-03-21 RX ORDER — SODIUM CHLORIDE, SODIUM LACTATE, POTASSIUM CHLORIDE, CALCIUM CHLORIDE 600; 310; 30; 20 MG/100ML; MG/100ML; MG/100ML; MG/100ML
INJECTION, SOLUTION INTRAVENOUS CONTINUOUS
Status: DISCONTINUED | OUTPATIENT
Start: 2024-03-21 | End: 2024-03-21 | Stop reason: HOSPADM

## 2024-03-21 RX ORDER — KETAMINE HYDROCHLORIDE 10 MG/ML
INJECTION, SOLUTION INTRAMUSCULAR; INTRAVENOUS PRN
Status: DISCONTINUED | OUTPATIENT
Start: 2024-03-21 | End: 2024-03-21

## 2024-03-21 RX ORDER — LIDOCAINE HYDROCHLORIDE ANHYDROUS AND DEXTROSE MONOHYDRATE 5; 400 G/100ML; MG/100ML
INJECTION, SOLUTION INTRAVENOUS CONTINUOUS PRN
Status: DISCONTINUED | OUTPATIENT
Start: 2024-03-21 | End: 2024-03-21 | Stop reason: SDUPTHER

## 2024-03-21 RX ORDER — FENTANYL CITRATE 50 UG/ML
50 INJECTION, SOLUTION INTRAMUSCULAR; INTRAVENOUS EVERY 5 MIN PRN
Status: DISCONTINUED | OUTPATIENT
Start: 2024-03-21 | End: 2024-03-21 | Stop reason: HOSPADM

## 2024-03-21 RX ORDER — SODIUM CHLORIDE 0.9 % (FLUSH) 0.9 %
5-40 SYRINGE (ML) INJECTION PRN
Status: DISCONTINUED | OUTPATIENT
Start: 2024-03-21 | End: 2024-03-21 | Stop reason: HOSPADM

## 2024-03-21 RX ORDER — HYDROMORPHONE HYDROCHLORIDE 1 MG/ML
0.5 INJECTION, SOLUTION INTRAMUSCULAR; INTRAVENOUS; SUBCUTANEOUS
Status: DISCONTINUED | OUTPATIENT
Start: 2024-03-21 | End: 2024-03-22 | Stop reason: HOSPADM

## 2024-03-21 RX ORDER — MAGNESIUM SULFATE HEPTAHYDRATE 40 MG/ML
INJECTION, SOLUTION INTRAVENOUS PRN
Status: DISCONTINUED | OUTPATIENT
Start: 2024-03-21 | End: 2024-03-21 | Stop reason: SDUPTHER

## 2024-03-21 RX ORDER — PHENYLEPHRINE HCL IN 0.9% NACL 0.4MG/10ML
SYRINGE (ML) INTRAVENOUS PRN
Status: DISCONTINUED | OUTPATIENT
Start: 2024-03-21 | End: 2024-03-21 | Stop reason: SDUPTHER

## 2024-03-21 RX ORDER — FENTANYL CITRATE 50 UG/ML
INJECTION, SOLUTION INTRAMUSCULAR; INTRAVENOUS PRN
Status: DISCONTINUED | OUTPATIENT
Start: 2024-03-21 | End: 2024-03-21 | Stop reason: SDUPTHER

## 2024-03-21 RX ORDER — SODIUM CHLORIDE 9 MG/ML
INJECTION, SOLUTION INTRAVENOUS PRN
Status: DISCONTINUED | OUTPATIENT
Start: 2024-03-21 | End: 2024-03-22 | Stop reason: HOSPADM

## 2024-03-21 RX ORDER — DEXAMETHASONE SODIUM PHOSPHATE 4 MG/ML
INJECTION, SOLUTION INTRA-ARTICULAR; INTRALESIONAL; INTRAMUSCULAR; INTRAVENOUS; SOFT TISSUE PRN
Status: DISCONTINUED | OUTPATIENT
Start: 2024-03-21 | End: 2024-03-21 | Stop reason: SDUPTHER

## 2024-03-21 RX ADMIN — SODIUM CHLORIDE, PRESERVATIVE FREE 10 ML: 5 INJECTION INTRAVENOUS at 21:54

## 2024-03-21 RX ADMIN — SODIUM CHLORIDE, POTASSIUM CHLORIDE, SODIUM LACTATE AND CALCIUM CHLORIDE 75 ML: 600; 310; 30; 20 INJECTION, SOLUTION INTRAVENOUS at 09:19

## 2024-03-21 RX ADMIN — DEXAMETHASONE SODIUM PHOSPHATE 8 MG: 4 INJECTION, SOLUTION INTRAMUSCULAR; INTRAVENOUS at 13:43

## 2024-03-21 RX ADMIN — LIDOCAINE HYDROCHLORIDE 2 MG/KG/HR: 4 INJECTION, SOLUTION INTRAVENOUS at 13:30

## 2024-03-21 RX ADMIN — ATENOLOL 50 MG: 50 TABLET ORAL at 21:54

## 2024-03-21 RX ADMIN — MIDAZOLAM HYDROCHLORIDE 1 MG: 1 INJECTION, SOLUTION INTRAMUSCULAR; INTRAVENOUS at 13:14

## 2024-03-21 RX ADMIN — ACETAMINOPHEN 1000 MG: 500 TABLET ORAL at 09:12

## 2024-03-21 RX ADMIN — PHENYLEPHRINE HYDROCHLORIDE 40 MCG/MIN: 10 INJECTION INTRAVENOUS at 13:52

## 2024-03-21 RX ADMIN — Medication 3 MG: at 15:01

## 2024-03-21 RX ADMIN — SODIUM CHLORIDE, POTASSIUM CHLORIDE, SODIUM LACTATE AND CALCIUM CHLORIDE: 600; 310; 30; 20 INJECTION, SOLUTION INTRAVENOUS at 13:29

## 2024-03-21 RX ADMIN — KETAMINE HYDROCHLORIDE 50 MG: 10 INJECTION, SOLUTION INTRAMUSCULAR; INTRAVENOUS at 13:29

## 2024-03-21 RX ADMIN — GLYCOPYRROLATE 0.6 MG: 0.2 INJECTION, SOLUTION INTRAMUSCULAR; INTRAVENOUS at 15:01

## 2024-03-21 RX ADMIN — PROPOFOL 100 MG: 10 INJECTION, EMULSION INTRAVENOUS at 13:29

## 2024-03-21 RX ADMIN — SODIUM CHLORIDE, SODIUM LACTATE, POTASSIUM CHLORIDE, CALCIUM CHLORIDE: 600; 310; 30; 20 INJECTION, SOLUTION INTRAVENOUS at 15:32

## 2024-03-21 RX ADMIN — CEFOXITIN 2000 MG: 2 INJECTION, POWDER, FOR SOLUTION INTRAVENOUS at 13:33

## 2024-03-21 RX ADMIN — MAGNESIUM SULFATE IN WATER 2000 MG: 40 INJECTION, SOLUTION INTRAVENOUS at 13:37

## 2024-03-21 RX ADMIN — LIDOCAINE HYDROCHLORIDE 100 MG: 20 INJECTION, SOLUTION EPIDURAL; INFILTRATION; INTRACAUDAL; PERINEURAL at 13:29

## 2024-03-21 RX ADMIN — Medication 80 MCG: at 13:34

## 2024-03-21 RX ADMIN — POTASSIUM CHLORIDE, DEXTROSE MONOHYDRATE AND SODIUM CHLORIDE: 150; 5; 450 INJECTION, SOLUTION INTRAVENOUS at 18:40

## 2024-03-21 RX ADMIN — SODIUM CHLORIDE, SODIUM LACTATE, POTASSIUM CHLORIDE, CALCIUM CHLORIDE: 600; 310; 30; 20 INJECTION, SOLUTION INTRAVENOUS at 13:15

## 2024-03-21 RX ADMIN — PROPOFOL 120 MCG/KG/MIN: 10 INJECTION, EMULSION INTRAVENOUS at 13:30

## 2024-03-21 RX ADMIN — FENTANYL CITRATE 100 MCG: 50 INJECTION, SOLUTION INTRAMUSCULAR; INTRAVENOUS at 13:29

## 2024-03-21 RX ADMIN — ONDANSETRON HYDROCHLORIDE 4 MG: 2 INJECTION, SOLUTION INTRAMUSCULAR; INTRAVENOUS at 15:02

## 2024-03-21 ASSESSMENT — PAIN - FUNCTIONAL ASSESSMENT: PAIN_FUNCTIONAL_ASSESSMENT: 0-10

## 2024-03-21 ASSESSMENT — ENCOUNTER SYMPTOMS: SHORTNESS OF BREATH: 1

## 2024-03-21 NOTE — PERIOP NOTE
No   recent   covid test  no s/s/   mepilex  sacrum border preventatively applied   skin intact.       Pt  states  he  cannot   swallow    pills    tylenol    given  crushed  and  THE  celebrex   COULD  NOT   BE  ADMINISTERED    BECAUSE   NEEDS   TO BE  PUT  IN APPLESAUCE  IF CRUSHED   PER PHARMACY   PT  IS  NPO    EXCEPT  FOR  MEDS     DR. HAYWOOD INFORMED    NOT   GIVEN.   DIETER  HOSE  ON .    NO   VALUABLES   BUT  THE   CANE    BACK  HERE  WITH PT.   1058   pt   voided in bathroom  .     pt     notified   that  surgery  delayed  and  pt  wife  informed  1107  -    pt   wife   contacted     thru   registrar   and   she   came  back to sit  with pt     aware  of   time  delay.  1155    dr. Haywood  into see pt   informed     no celebrex   given.    He  is  ok   with  this

## 2024-03-21 NOTE — DISCHARGE INSTRUCTIONS
Discharge Instructions:  Laparoscopic Appendectomy  Dr. Horner    Call for appointment for follow up in 1 weeks [phone]    Activity:    Walk regularly.  No lifting more than 10 -15 pounds for 4 weeks.  Light aerobic activity is okay when you feel up to it.    You may resume driving in three days unless still requiring narcotics for pain.      Work:    You may return to work in 1 or 2 weeks to light activity. No lifting more than 10 pounds for four weeks.    Diet:    You may resume normal diet after 24 hours.  Fatty foods may still cause some stomach upset.    Wound Care:    You have a special dressing called Dermabond.  It is okay to shower and let the water run over the incisions but do not scrub the area or soak in a tub.  If you have a small amount of drainage you may place a dry bandage over the wound and change it daily.  If you experience a lot of drainage, develop redness around the wound, or a fever over 101 F occurs please call the office.      Medications:    Resume home medications as indicated on the Medical Reconciliation form.       Pain medications:  Non steroidal antiinflammatories seem to work best for post surgical pain.  Try these first as prescribed.  A narcotic prescription will also be given for breakthrough pain.    Over the counter stool softeners and laxatives may be used if needed.    Narcotics and anesthesia sometimes cause nausea and vomiting.  If persistent please call the office.    Do not hesitate to call with questions or concerns.    RESUME ELIQUIS ON ROSELIA 3/24

## 2024-03-21 NOTE — BRIEF OP NOTE
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

## 2024-03-21 NOTE — FLOWSHEET NOTE
03/21/24 1745   Handoff   Communication Given Transfer Handoff   Handoff phase Phase I transferring   Handoff Given To Carla RAYGOZA   Handoff Received From Carmencita Santana RN   Handoff Communication Telephone

## 2024-03-21 NOTE — FLOWSHEET NOTE
03/21/24 1553   Handoff   Communication Given Transfer Handoff   Handoff phase Phase I receiving   Handoff Given To Carmencita (PACU RN)   Handoff Received From ALMA DELIA Mccollum RN and DONNA Presley CRNA   Handoff Communication At bedside;Face to Face   Time Handoff Given 2468

## 2024-03-21 NOTE — ANESTHESIA POSTPROCEDURE EVALUATION
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])    Providers: Kian Horner MD Responsible Provider: Alexander Ross MD    Anesthesia Type: General ASA Status: 3            Anesthesia Type: General    Lina Phase I: Lina Score: 8    Lina Phase II:      Anesthesia Post Evaluation    Patient location during evaluation: PACU  Patient participation: complete - patient participated  Level of consciousness: sleepy but conscious and responsive to verbal stimuli  Pain score: 2  Airway patency: patent  Nausea & Vomiting: no vomiting and no nausea  Cardiovascular status: blood pressure returned to baseline and hemodynamically stable  Respiratory status: acceptable  Hydration status: stable  Multimodal analgesia pain management approach  Pain management: adequate    No notable events documented.

## 2024-03-21 NOTE — ANESTHESIA PRE PROCEDURE
Department of Anesthesiology  Preprocedure Note       Name:  Chandrakant Funez   Age:  78 y.o.  :  1945                                          MRN:  [id number]         Date:  3/21/2024      Surgeon: Surgeon(s):  Kian Horner MD    Procedure: Procedure(s):  ROBOTIC ASSISTED LAPAROSCOPIC APPENDECTOMY; POSSIBLE RIGHT COLECTOMY (E R A S)    Medications prior to admission:   Prior to Admission medications    Medication Sig Start Date End Date Taking? Authorizing Provider   metoclopramide (REGLAN) 5 MG tablet Take 1 tablet by mouth 3 times daily As instructed on ERAS booklet 3/15/24   Kian Horner MD   erythromycin base (E-MYCIN) 500 MG tablet Take 1 tablet by mouth 3 times daily for 10 days As per ERAS booklet 3/15/24 3/25/24  Kian Horner MD   Acetaminophen (TYLENOL EXTRA STRENGTH PO) Take by mouth every 6 hours as needed LIQUID ONLY    Provider, MD Candida   apixaban (ELIQUIS) 5 MG TABS tablet Take 1 tablet by mouth 2 times daily 3/11/24   Mark Woodard MD   atenolol (TENORMIN) 50 MG tablet Take 2 tablets by mouth daily  Patient taking differently: Take 1 tablet by mouth in the morning and at bedtime Cuts in half and takes 23   Eve Breen, APRN - NP   ferrous sulfate (IRON 325) 325 (65 Fe) MG tablet Take by mouth daily 23   Automatic Reconciliation, Ar       Current medications:    No current facility-administered medications for this encounter.     Current Outpatient Medications   Medication Sig Dispense Refill   • metoclopramide (REGLAN) 5 MG tablet Take 1 tablet by mouth 3 times daily As instructed on ERAS booklet 3 tablet 0   • erythromycin base (E-MYCIN) 500 MG tablet Take 1 tablet by mouth 3 times daily for 10 days As per ERAS booklet 3 tablet 0   • Acetaminophen (TYLENOL EXTRA STRENGTH PO) Take by mouth every 6 hours as needed LIQUID ONLY     • apixaban (ELIQUIS) 5 MG TABS tablet Take 1 tablet by mouth 2 times daily 180 tablet 3   • atenolol (TENORMIN)

## 2024-03-21 NOTE — INTERVAL H&P NOTE
Update History & Physical    The patient's History and Physical of February 27, 2024 was reviewed with the patient and I examined the patient. There was no change. The surgical site was confirmed by the patient and me.     Plan: The risks, benefits, expected outcome, and alternative to the recommended procedure have been discussed with the patient. Patient understands and wants to proceed with the procedure.     Electronically signed by Kian Horner MD on 3/21/2024 at 9:15 AM

## 2024-03-21 NOTE — TELEPHONE ENCOUNTER
[facility]ta called in regards to prescription that was sent for pain medication letting md know it is out of stock will not be getting until next week seeing if there is another prescription that they can take or send to other pharmacy    [phone]

## 2024-03-21 NOTE — PROGRESS NOTES
.End of Shift Note    Bedside shift change report given to JARET Worthy (oncoming nurse) by FIDE MALIK LPN (offgoing nurse).  Report included the following information SBAR, Kardex, OR Summary, Procedure Summary, Intake/Output, and MAR    Shift worked:  7am-7pm     Shift summary and any significant changes:     Plan of care, post op status stable. Yoon draining. Pain controlled. A&O x 4. Has ambulated to bathroom. PIV fluids infusing. VSS.     Concerns for physician to address:  Post op plan of care     Zone phone for oncoming shift:   [phone]       Activity:     Number times ambulated in hallways past shift: 0  Number of times OOB to chair past shift: 0    Cardiac:   Cardiac Monitoring: No           Access:  Current line(s): PIV     Genitourinary:   Urinary status: yoon    Respiratory:      Chronic home O2 use?: NO  Incentive spirometer at bedside: YES       GI:     Current diet:  ADULT DIET; Clear Liquid  ADULT DIET; Regular  Passing flatus: YES  Tolerating current diet: YES       Pain Management:   Patient states pain is manageable on current regimen: YES    Skin:     Interventions: float heels and nutritional support    Patient Safety:  Fall Score:    Interventions: assistive device (walker, cane. etc), gripper socks, pt to call before getting OOB, and stay with me (per policy)       Length of Stay:  Expected LOS: 1  Actual LOS: 0      FIDE MALIK LPN

## 2024-03-22 ENCOUNTER — TELEPHONE (OUTPATIENT)
Age: 79
End: 2024-03-22

## 2024-03-22 VITALS
SYSTOLIC BLOOD PRESSURE: 133 MMHG | TEMPERATURE: 97.3 F | HEART RATE: 81 BPM | RESPIRATION RATE: 18 BRPM | HEIGHT: 67 IN | WEIGHT: 201.5 LBS | OXYGEN SATURATION: 99 % | DIASTOLIC BLOOD PRESSURE: 84 MMHG | BODY MASS INDEX: 31.63 KG/M2

## 2024-03-22 PROCEDURE — 6370000000 HC RX 637 (ALT 250 FOR IP): Performed by: SURGERY

## 2024-03-22 PROCEDURE — 99024 POSTOP FOLLOW-UP VISIT: CPT | Performed by: NURSE PRACTITIONER

## 2024-03-22 PROCEDURE — 6360000002 HC RX W HCPCS: Performed by: SURGERY

## 2024-03-22 RX ADMIN — ATENOLOL 50 MG: 50 TABLET ORAL at 08:37

## 2024-03-22 RX ADMIN — ENOXAPARIN SODIUM 40 MG: 100 INJECTION SUBCUTANEOUS at 08:39

## 2024-03-22 NOTE — TELEPHONE ENCOUNTER
Patient wanted to know if he should be taking an antibiotic. Please advise. Call back number is [phone]

## 2024-03-22 NOTE — DISCHARGE SUMMARY
DISCHARGE NOTE FROM SURGICAL-TELEMETRY NURSE    Patient determined to be stable for discharge by attending provider. I have reviewed the discharge instructions and follow-up appointments with the Patient. They verbalized understanding and all questions were answered to their satisfaction. No complaints or further questions were expressed.      Medications sent to pharmacy Appropriate educational materials and medication side effect teaching were provided.      PIV were removed prior to discharge.         All personal items collected during admission were returned to the patient prior to discharge.    Post-op patient: Mirlande Bowman RN

## 2024-03-22 NOTE — PLAN OF CARE
Problem: ABCDS Injury Assessment  Goal: Absence of physical injury  3/22/2024 0813 by Sarah Bowman RN  Outcome: Progressing  3/22/2024 0611 by Etta Ramey RN  Outcome: Progressing  Flowsheets (Taken 3/21/2024 1923)  Absence of Physical Injury: Implement safety measures based on patient assessment     Problem: Skin/Tissue Integrity  Goal: Absence of new skin breakdown  Description: 1.  Monitor for areas of redness and/or skin breakdown  2.  Assess vascular access sites hourly  3.  Every 4-6 hours minimum:  Change oxygen saturation probe site  4.  Every 4-6 hours:  If on nasal continuous positive airway pressure, respiratory therapy assess nares and determine need for appliance change or resting period.  3/22/2024 0813 by Sarah Bowman RN  Outcome: Progressing  3/22/2024 0611 by Etta Ramey RN  Outcome: Progressing     Problem: Pain  Goal: Verbalizes/displays adequate comfort level or baseline comfort level  3/22/2024 0813 by Sarah Bowman RN  Outcome: Progressing  3/22/2024 0611 by Etta Ramey RN  Outcome: Progressing  Flowsheets (Taken 3/21/2024 1923)  Verbalizes/displays adequate comfort level or baseline comfort level:   Encourage patient to monitor pain and request assistance   Assess pain using appropriate pain scale   Administer analgesics based on type and severity of pain and evaluate response   Implement non-pharmacological measures as appropriate and evaluate response   Notify Licensed Independent Practitioner if interventions unsuccessful or patient reports new pain     Problem: Safety - Adult  Goal: Free from fall injury  3/22/2024 0813 by Sarah Bowman RN  Outcome: Progressing  3/22/2024 0611 by Etta Ramey RN  Outcome: Progressing  Flowsheets (Taken 3/21/2024 1923)  Free From Fall Injury:   Instruct family/caregiver on patient safety   Based on caregiver fall risk screen, instruct family/caregiver to ask for assistance with transferring infant if caregiver noted to have fall risk factors    RN  Outcome: Progressing     Problem: Metabolic/Fluid and Electrolytes - Adult  Goal: Electrolytes maintained within normal limits  3/22/2024 0813 by Sarah Bowman RN  Outcome: Progressing  3/22/2024 0611 by Etta Ramey RN  Outcome: Progressing  Goal: Hemodynamic stability and optimal renal function maintained  3/22/2024 0813 by Sarah Bowman RN  Outcome: Progressing  3/22/2024 0611 by Etta Ramey RN  Outcome: Progressing     Problem: Hematologic - Adult  Goal: Maintains hematologic stability  3/22/2024 0813 by Sarah Bowman RN  Outcome: Progressing  3/22/2024 0611 by Etta Ramey RN  Outcome: Progressing

## 2024-03-22 NOTE — OP NOTE
[facility]              [address] [place]  [address]                            OPERATIVE REPORT      PATIENT NAME: POLY COE               : 1945  MED REC NO: [id number]                       ROOM: 3118  ACCOUNT NO: [id number]                       ADMIT DATE: 2024  PROVIDER: Kian Haywood MD    DATE OF SERVICE:  2024    PREOPERATIVE DIAGNOSES:  Appendiceal tumor.    POSTOPERATIVE DIAGNOSES:  Appendiceal tumor.    PROCEDURES PERFORMED:  Robotic-assisted laparoscopic appendectomy.    SURGEON:  Kian Haywood MD    ASSISTANT:  Gerald Liddle, SA    ANESTHESIA:  General.    ESTIMATED BLOOD LOSS:  Minimal.    SPECIMENS REMOVED:  Appendix.    INTRAOPERATIVE FINDINGS:  Appendiceal mass with adhesions around it, but no suggestion of carcinomatosis.     COMPLICATIONS:  None.    IMPLANTS:  None.    INDICATIONS:  The patient is a pleasant 78-year-old gentleman with an incidentally found appendiceal mass.  Options were discussed.  He elected to have the area excised.  He understands the risks and benefits and wished to proceed.  He knows it may require a right colectomy.  He has been bowel prepped and he understands the risks and benefits and wished to proceed with surgery.    DESCRIPTION OF PROCEDURE:  The patient was taken to the operating room and placed on the operating table in the supine position.  Underwent general endotracheal anesthesia.  Brooks catheter was placed and left arm was tucked and padded at the side, and the abdomen was prepped and draped in usual sterile fashion.  After appropriate time-out, antibiotics were given.  0.5% Marcaine was infiltrated in skin and subcutaneous tissues in the left upper quadrant.  Then, a small incision was made.  8 mm direct viewing trocar was inserted in the peritoneal cavity and insufflation begun to 15 mmHg pressure, gave good visualization.  Another 8 mm trocar was placed in the left

## 2024-03-22 NOTE — TELEPHONE ENCOUNTER
Returned call to patient. He is doing well. Advised that no post op antibiotic was prescribed, and to return call with any further questions or concerns. Patient expressed understanding.

## 2024-03-22 NOTE — DISCHARGE SUMMARY
Post- Surgical Discharge Summary    Patient ID:  Chandrakant Funez  [id number]  male  78 y.o.  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 Intra-operative complications: None; patient tolerated the procedure well. Was taken to the PACU in stable condition and then transferred to the surgical floor.      Pathology is pending. Surgeon will follow results as outpatient.    Perioperative Antibiotics: Cefoxitin    Postoperative Pain Management:  Oxycodone     Postoperative transfusions:    Number of units banked PRBCs =   none     Post Op complications: None     Incisions  - clean, dry and intact. No significant erythema or swelling. Wound(s) appear to be healing without any evidence of infection.      Patient mobilized with nursing and was found to be safe and steady with ambulation.     Discharged to: Home     Condition on Discharge: Stable     Discharge instructions:    - Take pain medications as prescribed  - Diet regular  - Discharge activity:    - Activity as tolerated    - Ambulate

## 2024-03-23 VITALS
BODY MASS INDEX: 33.32 KG/M2 | RESPIRATION RATE: 18 BRPM | OXYGEN SATURATION: 99 % | DIASTOLIC BLOOD PRESSURE: 92 MMHG | HEART RATE: 85 BPM | TEMPERATURE: 97.7 F | SYSTOLIC BLOOD PRESSURE: 139 MMHG | WEIGHT: 212.3 LBS | HEIGHT: 67 IN

## 2024-03-23 PROCEDURE — 51702 INSERT TEMP BLADDER CATH: CPT

## 2024-03-23 PROCEDURE — 99283 EMERGENCY DEPT VISIT LOW MDM: CPT

## 2024-03-23 PROCEDURE — 51798 US URINE CAPACITY MEASURE: CPT

## 2024-03-23 ASSESSMENT — PAIN - FUNCTIONAL ASSESSMENT: PAIN_FUNCTIONAL_ASSESSMENT: NONE - DENIES PAIN

## 2024-03-24 ENCOUNTER — HOSPITAL ENCOUNTER (EMERGENCY)
Facility: HOSPITAL | Age: 79
Discharge: HOME | End: 2024-03-24
Attending: EMERGENCY MEDICINE
Payer: MEDICARE

## 2024-03-24 VITALS
HEART RATE: 82 BPM | TEMPERATURE: 98.2 F | HEIGHT: 67 IN | SYSTOLIC BLOOD PRESSURE: 132 MMHG | RESPIRATION RATE: 16 BRPM | DIASTOLIC BLOOD PRESSURE: 85 MMHG | BODY MASS INDEX: 33.11 KG/M2 | OXYGEN SATURATION: 97 % | WEIGHT: 210.98 LBS

## 2024-03-24 DIAGNOSIS — T83.9XXA COMPLICATION OF FOLEY CATHETER, INITIAL ENCOUNTER (HCC): Primary | ICD-10-CM

## 2024-03-24 DIAGNOSIS — R33.9 URINARY RETENTION: Primary | ICD-10-CM

## 2024-03-24 LAB
APPEARANCE UR: CLEAR
BACTERIA URNS QL MICRO: NEGATIVE /HPF
BILIRUB UR QL: NEGATIVE
COLOR UR: ABNORMAL
EPITH CASTS URNS QL MICRO: ABNORMAL /LPF
GLUCOSE UR STRIP.AUTO-MCNC: NEGATIVE MG/DL
HGB UR QL STRIP: ABNORMAL
HYALINE CASTS URNS QL MICRO: ABNORMAL /LPF (ref 0–2)
KETONES UR QL STRIP.AUTO: NEGATIVE MG/DL
LEUKOCYTE ESTERASE UR QL STRIP.AUTO: NEGATIVE
NITRITE UR QL STRIP.AUTO: NEGATIVE
PH UR STRIP: 5.5 (ref 5–8)
PROT UR STRIP-MCNC: NEGATIVE MG/DL
RBC #/AREA URNS HPF: ABNORMAL /HPF (ref 0–5)
SP GR UR REFRACTOMETRY: 1.01
URINE CULTURE IF INDICATED: ABNORMAL
UROBILINOGEN UR QL STRIP.AUTO: 0.2 EU/DL (ref 0.2–1)
WBC URNS QL MICRO: ABNORMAL /HPF (ref 0–4)

## 2024-03-24 PROCEDURE — 81001 URINALYSIS AUTO W/SCOPE: CPT

## 2024-03-24 PROCEDURE — 99282 EMERGENCY DEPT VISIT SF MDM: CPT

## 2024-03-24 RX ORDER — LIDOCAINE HYDROCHLORIDE 20 MG/ML
JELLY TOPICAL PRN
Status: DISCONTINUED | OUTPATIENT
Start: 2024-03-24 | End: 2024-03-24 | Stop reason: HOSPADM

## 2024-03-24 ASSESSMENT — LIFESTYLE VARIABLES
HOW MANY STANDARD DRINKS CONTAINING ALCOHOL DO YOU HAVE ON A TYPICAL DAY: PATIENT DOES NOT DRINK
HOW MANY STANDARD DRINKS CONTAINING ALCOHOL DO YOU HAVE ON A TYPICAL DAY: PATIENT DOES NOT DRINK
HOW OFTEN DO YOU HAVE A DRINK CONTAINING ALCOHOL: NEVER
HOW OFTEN DO YOU HAVE A DRINK CONTAINING ALCOHOL: NEVER

## 2024-03-24 ASSESSMENT — PAIN SCALES - GENERAL: PAINLEVEL_OUTOF10: 0

## 2024-03-24 ASSESSMENT — PAIN - FUNCTIONAL ASSESSMENT: PAIN_FUNCTIONAL_ASSESSMENT: NONE - DENIES PAIN

## 2024-03-24 NOTE — ED PROVIDER NOTES
EMERGENCY DEPARTMENT HISTORY AND PHYSICAL EXAM      Date: 3/24/2024  Patient Name: Chandrakant Funez    History of Presenting Illness     Chief Complaint   Patient presents with    Urinary Retention     Pt was discharged yesterday from the hospital for him to have his appendix removed. Pt had a yoon while in the hospital and had a voiding trial and was able to void. Pt has been drinking plenty of fluids but not able to pass a lot of urine. Pt is unable to bear down due to sutures. Pt would like to be evaluated for his difficulty urinating enough.        History Provided By: Patient and Patient's Wife    HPI: Chandrakant Funez, 78 y.o. male with a past medical history significant for medical problems as stated below presents to the ED with cc of concern for urinary retention status post recent surgical procedure.  Patient reports recent colonoscopy and appendectomy.  Patient was discharged on the 22nd for an appendectomy.  Patient had a Yoon in place during his admission. Patient endorsed that he was able to void prior after the Yoon was prior to discharge.  Since getting home patient has only had minimal urine output while drinking up to 55 of symptoms.  Patient has been tolerating solid p.o. but has not had a bowel movement yet.  Patient denies any abdominal pain nausea or vomiting.  In addition patient denies any dysuria, discharge or testicular pain/discomfort.  Patient describes suprapubic fullness and a sensation that he needs to void.     There are no associated symptoms.  No other exacerbating or ameliorating factors.    PCP: Bubba Michelle MD    No current facility-administered medications on file prior to encounter.     Current Outpatient Medications on File Prior to Encounter   Medication Sig Dispense Refill    oxyCODONE (ROXICODONE) 5 MG immediate release tablet Take 1 tablet by mouth every 6 hours as needed for Pain for up to 3 days. Intended supply: 3 days. Take lowest dose possible to manage pain

## 2024-03-24 NOTE — ED PROVIDER NOTES
yesterday.  I discussed with him the possibility of removing the catheter and placing  However given the difficulty that was had yesterday could run into issues again today.  Patient elects to keep current Brooks catheter in place and will follow-up with urology this week.      FINAL IMPRESSION     1. Complication of Brooks catheter, initial encounter ([facility])          DISPOSITION/PLAN   Chandrakant Funez's  results have been reviewed with him.  He has been counseled regarding his diagnosis, treatment, and plan.  He verbally conveys understanding and agreement of the signs, symptoms, diagnosis, treatment and prognosis and additionally agrees to follow up as discussed.  He also agrees with the care-plan and conveys that all of his questions have been answered.  I have also provided discharge instructions for him that include: educational information regarding their diagnosis and treatment, and list of reasons why they would want to return to the ED prior to their follow-up appointment, should his condition change.     CLINICAL IMPRESSION    Discharge Note: The patient is stable for discharge home. The signs, symptoms, diagnosis, and discharge instructions have been discussed, understanding conveyed, and agreed upon. The patient is to follow up as recommended or return to ER should their symptoms worsen.      PATIENT REFERRED TO:  Bubba Michelle MD  [address] [facility] [address]  [facility] [address] [phone]          Tavares Mallory MD  [address] [facility] [address] [phone]             DISCHARGE MEDICATIONS:     Medication List        ASK your doctor about these medications      apixaban 5 MG Tabs tablet  Commonly known as: ELIQUIS  Take 1 tablet by mouth 2 times daily     atenolol 50 MG tablet  Commonly known as: TENORMIN  Take 2 tablets by mouth daily     ferrous sulfate 325 (65 Fe) MG tablet  Commonly known as: IRON 325     oxyCODONE 5 MG immediate release tablet  Commonly known as:

## 2024-03-24 NOTE — ED NOTES
Pt yoon catheter irrigated w/o complications. Pt tolerated well. No apparent leakage. Pt yoon catheter reattached to bag and draining. Provider notified.

## 2024-03-25 ENCOUNTER — TELEPHONE (OUTPATIENT)
Age: 79
End: 2024-03-25

## 2024-03-25 NOTE — TELEPHONE ENCOUNTER
----- Message from Magi Castro MA sent at 3/25/2024  9:10 AM EDT -----  Regarding: FW: Follow-up from previous email  Contact: [phone]    ----- Message -----  From: Chandrakant Molina  Sent: 3/24/2024  10:41 PM EDT  To: [facility] Clinical Staff  Subject: Follow-up from previous email                    Dr. Rubin,    It is now 10:21 p.m. on Sunday. I am seeing blood in my urine. I do not know if this is a result of having resumed taking Eliquis. I took the first dose at 8:00 a.m. on Sunday before going back to the emergency room. When they performed the saline flush, no blood appeared in the catheter. Later in the afternoon, my urine turned yellowish-red. My urine was already red when I took my required second dose of Eliquis.    My questions are, should I discontinue taking the Eliquis, should I come to see you, or see if I can get an emergency appointment with one of the two urologists they recommended?    Please have someone call me directly instead of putting a response in [facility]. My wife will be tied up in a meeting and knows more about retrieving [facility] information.    My phone number is [phone].    Thank you,    Chandrakant Molina

## 2024-04-05 ENCOUNTER — OFFICE VISIT (OUTPATIENT)
Age: 79
End: 2024-04-05

## 2024-04-05 VITALS
OXYGEN SATURATION: 96 % | BODY MASS INDEX: 31.71 KG/M2 | HEIGHT: 67 IN | RESPIRATION RATE: 18 BRPM | WEIGHT: 202 LBS | TEMPERATURE: 97.3 F | SYSTOLIC BLOOD PRESSURE: 129 MMHG | HEART RATE: 82 BPM | DIASTOLIC BLOOD PRESSURE: 86 MMHG

## 2024-04-05 DIAGNOSIS — D37.3 LOW GRADE MUCINOUS NEOPLASM OF APPENDIX: Primary | ICD-10-CM

## 2024-04-05 PROCEDURE — 99024 POSTOP FOLLOW-UP VISIT: CPT | Performed by: SURGERY

## 2024-04-05 ASSESSMENT — PATIENT HEALTH QUESTIONNAIRE - PHQ9
2. FEELING DOWN, DEPRESSED OR HOPELESS: NOT AT ALL
SUM OF ALL RESPONSES TO PHQ QUESTIONS 1-9: 0
1. LITTLE INTEREST OR PLEASURE IN DOING THINGS: NOT AT ALL
SUM OF ALL RESPONSES TO PHQ QUESTIONS 1-9: 0
SUM OF ALL RESPONSES TO PHQ QUESTIONS 1-9: 0
SUM OF ALL RESPONSES TO PHQ9 QUESTIONS 1 & 2: 0
SUM OF ALL RESPONSES TO PHQ QUESTIONS 1-9: 0

## 2024-04-05 NOTE — PROGRESS NOTES
Surgery  Follow up    Procedure: Robotic-assisted laparoscopic appendectomy   OR date:  3/21/2024  Path:    FINAL PATHOLOGIC DIAGNOSIS     Appendix, appendectomy:        Low-grade appendiceal mucinous neoplasm (LAMN).        Lesion uninvolved by proximal margin.        No perforations grossly identified.        See synoptic below.     APPENDIX: Resection    SPECIMEN       Procedure: Appendectomy      TUMOR       Histologic Type: Low-grade appendiceal mucinous neoplasm       Histologic Grade: Not applicable       Tumor Size: 4.5 cm       Tumor Deposits: Not identified       Tumor Extent: Acellular mucin invades submucosa       Lymphatic and / or Vascular Invasion: Not identified      MARGINS       Margin Status for Invasive Carcinoma: Not applicable       Margin Status for Non-Invasive Tumor: All margins negative for   non-invasive tumor      REGIONAL LYMPH NODES       Regional Lymph Node Status: Not applicable (no regional lymph nodes   submitted or found)      DISTANT METASTASIS       Distant Site(s) Involved: Not applicable      pTNM CLASSIFICATION (AJCC 9th Version)       pT Category: pTis (LAMN)       pN Category: pN not assigned (no nodes submitted or found)     S I feel fine, no pain, bowels moving well    /86 (Site: Left Upper Arm, Position: Sitting, Cuff Size: Large Adult)   Pulse 82   Temp 97.3 °F (36.3 °C) (Temporal)   Resp 18   Ht 1.702 m (5' 7\")   Wt 91.6 kg (202 lb)   SpO2 96%   BMI 31.64 kg/m²     O Incisions healing well without infection   No signs of hernia    A/P Doing well   Plan CT abd/pelvis in 6 months   Discussed path   No heavy lifting for another 4 weeks   Keep follow up with Dr Maile HERNÁNDEZ 6 months (after CT)    Kian Horner MD FACS

## 2024-04-05 NOTE — PROGRESS NOTES
Identified pt with two pt identifiers (name and ). Reviewed chart in preparation for visit and have obtained necessary documentation.    Chandrakant Funez is a 78 y.o. male  Chief Complaint   Patient presents with    Post-Op Check     2 week robotic assisted laparoscopic appendectomy possible right colectomy      /86 (Site: Left Upper Arm, Position: Sitting, Cuff Size: Large Adult)   Pulse 82   Temp 97.3 °F (36.3 °C) (Temporal)   Resp 18   Ht 1.702 m (5' 7\")   Wt 91.6 kg (202 lb)   SpO2 96%   BMI 31.64 kg/m²     1. Have you been to the ER, urgent care clinic since your last visit?  Hospitalized since your last visit?yes - [facility] 24    2. Have you seen or consulted any other health care providers outside of the [facility] since your last visit?  Include any pap smears or colon screening. no

## 2024-04-07 PROCEDURE — 93294 REM INTERROG EVL PM/LDLS PM: CPT | Performed by: INTERNAL MEDICINE

## 2024-05-16 ENCOUNTER — OFFICE VISIT (OUTPATIENT)
Age: 79
End: 2024-05-16
Payer: MEDICARE

## 2024-05-16 VITALS
WEIGHT: 200 LBS | BODY MASS INDEX: 31.39 KG/M2 | OXYGEN SATURATION: 97 % | TEMPERATURE: 97.9 F | HEIGHT: 67 IN | RESPIRATION RATE: 16 BRPM | HEART RATE: 82 BPM | DIASTOLIC BLOOD PRESSURE: 82 MMHG | SYSTOLIC BLOOD PRESSURE: 127 MMHG

## 2024-05-16 DIAGNOSIS — I48.20 CHRONIC ATRIAL FIBRILLATION (HCC): ICD-10-CM

## 2024-05-16 DIAGNOSIS — D69.6 THROMBOCYTOPENIA (HCC): ICD-10-CM

## 2024-05-16 DIAGNOSIS — R33.9 URINARY RETENTION: Primary | ICD-10-CM

## 2024-05-16 DIAGNOSIS — G62.0 DRUG-INDUCED POLYNEUROPATHY (HCC): ICD-10-CM

## 2024-05-16 DIAGNOSIS — I71.21 ANEURYSM OF ASCENDING AORTA WITHOUT RUPTURE (HCC): ICD-10-CM

## 2024-05-16 DIAGNOSIS — C18.9 MALIGNANT NEOPLASM OF COLON, UNSPECIFIED PART OF COLON (HCC): ICD-10-CM

## 2024-05-16 PROCEDURE — 99213 OFFICE O/P EST LOW 20 MIN: CPT | Performed by: STUDENT IN AN ORGANIZED HEALTH CARE EDUCATION/TRAINING PROGRAM

## 2024-05-16 NOTE — PROGRESS NOTES
\"Have you been to the ER, urgent care clinic since your last visit?  Hospitalized since your last visit?\"    03/24/2024 [facility] Urinary retention     “Have you seen or consulted any other health care providers outside of [facility] since your last visit?”    NO            Click Here for Release of Records Request

## 2024-05-16 NOTE — PROGRESS NOTES
HISTORY OF PRESENT ILLNESS   Chandrakant Funez is a 78 y.o. male who  has a past medical history of Aortic root dilation (HCC), Arrhythmia, At risk for sleep apnea, Atrial fibrillation (HCC), Cancer (HCC), Essential hypertension, Frequent urination at night, Swallowing difficulty, and Uncircumcised male.     Pt presents for 6 mo f/up    After recent appendectomy, c/b urinary retention, went to ED, yoon replaced for about 2 weeks. Subsequently has been urinating normally now. Has follow up with urology.     Thrombocytopenia: Chronic, mild, chemo-induced.   3/13/24 135    Complete AV block, Afib: recall s/p ablation and PPM placement, on atenolol and apixapan. Following with EP - Dr. Woodard, on atenolol  He is checking his pacemaker every 3 months.     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 He has not been on gabapentin as far as he knows. He does also go to podiatry but \"he never thought too much of it.\"    Aneurysm of ascending aorta without rupture (HCC): on atenolol.  2/7/23 CT chest w/o aneurysm      Colon cancer: recall S/p chemotherapy and [facility] Currently in clinical study to use cancer DNA  to detect recurrence. Had port placed.   3/21/24 appendectomy for enlarging mucocele. Has follow up with Dr. Gr scheduled.       HM - pt will get shingles and flu vaccines from his pharmacy.  MAWV due 8/17/24  Planning to obtain RSV vaccine from pharmacy    SOCIAL HISTORY:    /82   Pulse 82   Temp 97.9 °F (36.6 °C) (Temporal)   Resp 16   Ht 1.702 m (5' 7\")   Wt 90.7 kg (200 lb)   SpO2 97%   BMI 31.32 kg/m²       Physical Exam  Constitutional:       General: He is not in acute distress.     Appearance: Normal appearance. He is not toxic-appearing.   HENT:      Head: Normocephalic and atraumatic.      Mouth/Throat:      Mouth: Mucous membranes are moist.      Pharynx: No posterior oropharyngeal erythema.   Eyes:      Extraocular

## 2024-05-24 RX ORDER — ATENOLOL 50 MG/1
100 TABLET ORAL DAILY
Qty: 180 TABLET | Refills: 1 | Status: SHIPPED | OUTPATIENT
Start: 2024-05-24

## 2024-05-24 NOTE — TELEPHONE ENCOUNTER
Med refilled per VO by MD.    Future Appointments   Date Time Provider Department Center   8/20/2024 10:30 AM Bubba Michelle MD [facility] BS AMB   9/5/2024 11:00 AM [facility] BS AMB   9/5/2024 11:20 AM Mark Woodard MD [facility] BS AMB

## 2024-06-05 NOTE — ADDENDUM NOTE
Addended by: Yuliet Duron on: 5/17/2022 03:02 PM     Modules accepted: Orders Writer spoke with lab and blood cultures were drawn last night. Dr. Pizarro would like to have peripheral sites drawn and potentially have port site drawn.

## 2024-06-30 ENCOUNTER — TELEPHONE (OUTPATIENT)
Age: 79
End: 2024-06-30

## 2024-07-27 PROCEDURE — 93294 REM INTERROG EVL PM/LDLS PM: CPT | Performed by: INTERNAL MEDICINE

## 2024-08-13 SDOH — HEALTH STABILITY: PHYSICAL HEALTH: ON AVERAGE, HOW MANY MINUTES DO YOU ENGAGE IN EXERCISE AT THIS LEVEL?: 0 MIN

## 2024-08-13 SDOH — HEALTH STABILITY: PHYSICAL HEALTH: ON AVERAGE, HOW MANY DAYS PER WEEK DO YOU ENGAGE IN MODERATE TO STRENUOUS EXERCISE (LIKE A BRISK WALK)?: 0 DAYS

## 2024-08-13 ASSESSMENT — PATIENT HEALTH QUESTIONNAIRE - PHQ9
2. FEELING DOWN, DEPRESSED OR HOPELESS: NOT AT ALL
SUM OF ALL RESPONSES TO PHQ QUESTIONS 1-9: 0
SUM OF ALL RESPONSES TO PHQ QUESTIONS 1-9: 0
1. LITTLE INTEREST OR PLEASURE IN DOING THINGS: NOT AT ALL
SUM OF ALL RESPONSES TO PHQ9 QUESTIONS 1 & 2: 0
SUM OF ALL RESPONSES TO PHQ QUESTIONS 1-9: 0
SUM OF ALL RESPONSES TO PHQ QUESTIONS 1-9: 0

## 2024-08-13 ASSESSMENT — LIFESTYLE VARIABLES
HOW OFTEN DO YOU HAVE A DRINK CONTAINING ALCOHOL: 1
HOW MANY STANDARD DRINKS CONTAINING ALCOHOL DO YOU HAVE ON A TYPICAL DAY: 0
HOW OFTEN DO YOU HAVE SIX OR MORE DRINKS ON ONE OCCASION: 1
HOW MANY STANDARD DRINKS CONTAINING ALCOHOL DO YOU HAVE ON A TYPICAL DAY: PATIENT DOES NOT DRINK
HOW OFTEN DO YOU HAVE A DRINK CONTAINING ALCOHOL: NEVER

## 2024-08-17 SDOH — ECONOMIC STABILITY: FOOD INSECURITY: WITHIN THE PAST 12 MONTHS, YOU WORRIED THAT YOUR FOOD WOULD RUN OUT BEFORE YOU GOT MONEY TO BUY MORE.: NEVER TRUE

## 2024-08-17 SDOH — ECONOMIC STABILITY: FOOD INSECURITY: WITHIN THE PAST 12 MONTHS, THE FOOD YOU BOUGHT JUST DIDN'T LAST AND YOU DIDN'T HAVE MONEY TO GET MORE.: NEVER TRUE

## 2024-08-17 SDOH — ECONOMIC STABILITY: INCOME INSECURITY: HOW HARD IS IT FOR YOU TO PAY FOR THE VERY BASICS LIKE FOOD, HOUSING, MEDICAL CARE, AND HEATING?: NOT HARD AT ALL

## 2024-08-17 SDOH — ECONOMIC STABILITY: TRANSPORTATION INSECURITY
IN THE PAST 12 MONTHS, HAS LACK OF TRANSPORTATION KEPT YOU FROM MEETINGS, WORK, OR FROM GETTING THINGS NEEDED FOR DAILY LIVING?: NO

## 2024-08-18 NOTE — TELEPHONE ENCOUNTER
Post rocephin infusion this RN flushed patients IV and was difficult to flush and hard at insertion sight. IV removed. Mild injury 6%. Arm elevated on extremity and MD notified.   Pt. Following up on an Echo he had done on 08/16. Wants to know if there needed to be a medication change.      Phone - [phone]

## 2024-08-20 ENCOUNTER — OFFICE VISIT (OUTPATIENT)
Age: 79
End: 2024-08-20
Payer: MEDICARE

## 2024-08-20 VITALS
HEART RATE: 81 BPM | DIASTOLIC BLOOD PRESSURE: 84 MMHG | HEIGHT: 67 IN | SYSTOLIC BLOOD PRESSURE: 125 MMHG | OXYGEN SATURATION: 97 % | RESPIRATION RATE: 16 BRPM | WEIGHT: 202 LBS | TEMPERATURE: 97 F | BODY MASS INDEX: 31.71 KG/M2

## 2024-08-20 DIAGNOSIS — I44.2 COMPLETE AV BLOCK DUE TO AV NODAL ABLATION (HCC): ICD-10-CM

## 2024-08-20 DIAGNOSIS — I97.190 COMPLETE AV BLOCK DUE TO AV NODAL ABLATION (HCC): ICD-10-CM

## 2024-08-20 DIAGNOSIS — D69.6 THROMBOCYTOPENIA (HCC): ICD-10-CM

## 2024-08-20 DIAGNOSIS — I48.20 CHRONIC ATRIAL FIBRILLATION (HCC): ICD-10-CM

## 2024-08-20 DIAGNOSIS — I71.21 ANEURYSM OF ASCENDING AORTA WITHOUT RUPTURE (HCC): ICD-10-CM

## 2024-08-20 DIAGNOSIS — M40.209 KYPHOSIS, UNSPECIFIED KYPHOSIS TYPE, UNSPECIFIED SPINAL REGION: Primary | ICD-10-CM

## 2024-08-20 DIAGNOSIS — Z00.00 MEDICARE ANNUAL WELLNESS VISIT, SUBSEQUENT: ICD-10-CM

## 2024-08-20 DIAGNOSIS — R29.898 WEAKNESS OF BOTH LOWER EXTREMITIES: ICD-10-CM

## 2024-08-20 DIAGNOSIS — I77.810 AORTIC ROOT DILATION (HCC): ICD-10-CM

## 2024-08-20 PROCEDURE — 1123F ACP DISCUSS/DSCN MKR DOCD: CPT | Performed by: STUDENT IN AN ORGANIZED HEALTH CARE EDUCATION/TRAINING PROGRAM

## 2024-08-20 PROCEDURE — 99214 OFFICE O/P EST MOD 30 MIN: CPT | Performed by: STUDENT IN AN ORGANIZED HEALTH CARE EDUCATION/TRAINING PROGRAM

## 2024-08-20 PROCEDURE — G0439 PPPS, SUBSEQ VISIT: HCPCS | Performed by: STUDENT IN AN ORGANIZED HEALTH CARE EDUCATION/TRAINING PROGRAM

## 2024-08-20 SDOH — ECONOMIC STABILITY: INCOME INSECURITY: HOW HARD IS IT FOR YOU TO PAY FOR THE VERY BASICS LIKE FOOD, HOUSING, MEDICAL CARE, AND HEATING?: NOT HARD AT ALL

## 2024-08-20 SDOH — ECONOMIC STABILITY: FOOD INSECURITY: WITHIN THE PAST 12 MONTHS, THE FOOD YOU BOUGHT JUST DIDN'T LAST AND YOU DIDN'T HAVE MONEY TO GET MORE.: NEVER TRUE

## 2024-08-20 SDOH — ECONOMIC STABILITY: FOOD INSECURITY: WITHIN THE PAST 12 MONTHS, YOU WORRIED THAT YOUR FOOD WOULD RUN OUT BEFORE YOU GOT MONEY TO BUY MORE.: NEVER TRUE

## 2024-08-20 NOTE — PROGRESS NOTES
HISTORY OF PRESENT ILLNESS   Chandrakant Funez is a 78 y.o. male who  has a past medical history of Aortic root dilation (HCC), Arrhythmia, At risk for sleep apnea, Atrial fibrillation (HCC), Cancer (HCC), Essential hypertension, Frequent urination at night, Swallowing difficulty, and Uncircumcised male.     Pt presents for mawv.   Recently did blood work with hematologist. 6/25/24 wbc, 6.0, hgb 13.8, plt 133 (improved). Cmp and CEA pending.     Imbalance:  Pt walks with a cane at all times. No recent falls, no near falls. LE have been weaker since he did chemo.     C/o worsening neck pain, particularly when he is upright and trying to look up. He has always had issues with swallowing but no change.     After recent appendectomy, c/b urinary retention, went to ED, yoon replaced for about 2 weeks. Subsequently has been urinating normally now. Has follow up with urology.     Thrombocytopenia: Chronic, mild, chemo-induced.   3/13/24 135    Complete AV block, Afib: recall s/p ablation and PPM placement, on atenolol and apixapan. Following with EP - Dr. Woodard, on atenolol  He is checking his pacemaker every 3 months.   Next appointment 9/5/24.     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 He has not been on gabapentin as far as he knows. He does also go to podiatry but \"he never thought too much of it.\"    Aneurysm of ascending aorta without rupture (HCC): on atenolol.  2/7/23 CT chest w/o aneurysm      Colon cancer: recall S/p chemotherapy and sigmoidectomy in 3/2021. following with [facility]. Currently in clinical study to use cancer DNA  to detect recurrence. Had port placed.   3/21/24 appendectomy for enlarging mucocele. Has follow up with Dr. Gr scheduled.       HM - pt will get shingles and flu vaccines from his pharmacy.  MAWV due 8/17/24  Planning to obtain RSV vaccine from pharmacy    SOCIAL HISTORY:    /84 (Site: Right Upper Arm, Position: Sitting, Cuff Size: Large Adult)

## 2024-08-20 NOTE — PATIENT INSTRUCTIONS
your energy and can help you sleep better.  Muscle-strengthening.  This type of activity can help maintain muscle and strengthen bones.  Includes climbing stairs, using resistance bands, and lifting or carrying heavy loads.  Aim for at least twice a week.  It can help protect the knees and other joints.  Stretching.  Stretching gives you better range of motion in joints and muscles.  Includes upper arm stretches, calf stretches, and gentle yoga.  Aim for at least twice a week, preferably after your muscles are warmed up from other activities.  It can help you function better in daily life.  Balancing.  This helps you stay coordinated and have good posture.  Includes heel-to-toe walking, beata chi, and certain types of yoga.  Aim for at least 3 days a week.  It can reduce your risk of falling.  Even if you have a hard time meeting the recommendations, it's better to be more active than less active. All activity done in each category counts toward your weekly total. You'd be surprised how daily things like carrying groceries, keeping up with grandchildren, and taking the stairs can add up.  What keeps you from being active?  If you've had a hard time being more active, you're not alone. Maybe you remember being able to do more. Or maybe you've never thought of yourself as being active. It's frustrating when you can't do the things you want. Being more active can help. What's holding you back?  Getting started.  Have a goal, but break it into easy tasks. Small steps build into big accomplishments.  Staying motivated.  If you feel like skipping your activity, remember your goal. Maybe you want to move better and stay independent. Every activity gets you one step closer.  Not feeling your best.  Start with 5 minutes of an activity you enjoy. Prove to yourself you can do it. As you get comfortable, increase your time.  You may not be where you want to be. But you're in the process of getting there. Everyone starts

## 2024-08-21 ENCOUNTER — PATIENT MESSAGE (OUTPATIENT)
Age: 79
End: 2024-08-21

## 2024-08-21 DIAGNOSIS — M40.209 KYPHOSIS, UNSPECIFIED KYPHOSIS TYPE, UNSPECIFIED SPINAL REGION: Primary | ICD-10-CM

## 2024-08-23 ENCOUNTER — TELEPHONE (OUTPATIENT)
Age: 79
End: 2024-08-23

## 2024-08-23 DIAGNOSIS — M40.209 KYPHOSIS, UNSPECIFIED KYPHOSIS TYPE, UNSPECIFIED SPINAL REGION: Primary | ICD-10-CM

## 2024-08-23 NOTE — TELEPHONE ENCOUNTER
1. Kyphosis, unspecified kyphosis type, unspecified spinal region  -     XR CERVICAL SPINE (4 OR 5 VIEWS); Future

## 2024-09-04 ENCOUNTER — HOSPITAL ENCOUNTER (OUTPATIENT)
Facility: HOSPITAL | Age: 79
Discharge: HOME | End: 2024-09-07
Payer: MEDICARE

## 2024-09-04 DIAGNOSIS — M40.209 KYPHOSIS, UNSPECIFIED KYPHOSIS TYPE, UNSPECIFIED SPINAL REGION: ICD-10-CM

## 2024-09-04 PROCEDURE — 72050 X-RAY EXAM NECK SPINE 4/5VWS: CPT

## 2024-09-05 ENCOUNTER — PROCEDURE VISIT (OUTPATIENT)
Age: 79
End: 2024-09-05
Payer: MEDICARE

## 2024-09-05 ENCOUNTER — OFFICE VISIT (OUTPATIENT)
Age: 79
End: 2024-09-05
Payer: MEDICARE

## 2024-09-05 VITALS
OXYGEN SATURATION: 99 % | DIASTOLIC BLOOD PRESSURE: 80 MMHG | WEIGHT: 204 LBS | HEIGHT: 67 IN | HEART RATE: 84 BPM | BODY MASS INDEX: 32.02 KG/M2 | SYSTOLIC BLOOD PRESSURE: 120 MMHG

## 2024-09-05 DIAGNOSIS — Z95.0 CARDIAC PACEMAKER IN SITU: Primary | ICD-10-CM

## 2024-09-05 DIAGNOSIS — Z95.0 PRESENCE OF CARDIAC PACEMAKER: Primary | ICD-10-CM

## 2024-09-05 DIAGNOSIS — Z79.01 ANTICOAGULATED: ICD-10-CM

## 2024-09-05 DIAGNOSIS — I97.190 COMPLETE AV BLOCK DUE TO AV NODAL ABLATION (HCC): ICD-10-CM

## 2024-09-05 DIAGNOSIS — I44.2 COMPLETE AV BLOCK DUE TO AV NODAL ABLATION (HCC): ICD-10-CM

## 2024-09-05 DIAGNOSIS — I48.11 LONGSTANDING PERSISTENT ATRIAL FIBRILLATION (HCC): ICD-10-CM

## 2024-09-05 PROCEDURE — 1123F ACP DISCUSS/DSCN MKR DOCD: CPT | Performed by: INTERNAL MEDICINE

## 2024-09-05 PROCEDURE — 99214 OFFICE O/P EST MOD 30 MIN: CPT | Performed by: INTERNAL MEDICINE

## 2024-09-05 PROCEDURE — G8427 DOCREV CUR MEDS BY ELIG CLIN: HCPCS | Performed by: INTERNAL MEDICINE

## 2024-09-05 PROCEDURE — 93279 PRGRMG DEV EVAL PM/LDLS PM: CPT | Performed by: INTERNAL MEDICINE

## 2024-09-05 PROCEDURE — G8417 CALC BMI ABV UP PARAM F/U: HCPCS | Performed by: INTERNAL MEDICINE

## 2024-09-05 PROCEDURE — 1036F TOBACCO NON-USER: CPT | Performed by: INTERNAL MEDICINE

## 2024-09-05 RX ORDER — ATENOLOL 50 MG/1
50 TABLET ORAL 2 TIMES DAILY
Qty: 180 TABLET | Refills: 1 | Status: SHIPPED | OUTPATIENT
Start: 2024-09-05

## 2024-09-05 ASSESSMENT — PATIENT HEALTH QUESTIONNAIRE - PHQ9
SUM OF ALL RESPONSES TO PHQ QUESTIONS 1-9: 0
1. LITTLE INTEREST OR PLEASURE IN DOING THINGS: NOT AT ALL
SUM OF ALL RESPONSES TO PHQ QUESTIONS 1-9: 0
SUM OF ALL RESPONSES TO PHQ QUESTIONS 1-9: 0
2. FEELING DOWN, DEPRESSED OR HOPELESS: NOT AT ALL
SUM OF ALL RESPONSES TO PHQ QUESTIONS 1-9: 0
SUM OF ALL RESPONSES TO PHQ9 QUESTIONS 1 & 2: 0

## 2024-09-05 NOTE — PROGRESS NOTES
Tricuspid Valve: Mild to moderate transvalvular regurgitation.   ·  Pulmonic Valve: Mild transvalvular regurgitation.   ·  Left Atrium: Left atrium is moderately dilated.   ·  Right Atrium: Right atrium is mildly dilated.   ·  Aorta: Mildly dilated aortic root. Mildly dilated ascending aorta.     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)   · Aortic root dilation (HCC)   · Aneurysm of ascending aorta without rupture (HCC)   · Phimosis   · Thrombocytopenia (HCC)   · Drug-induced polyneuropathy (HCC)   · Anemia due to antineoplastic chemotherapy   · Appendiceal tumor       Past Medical History:   Diagnosis Date   · Aortic root dilation (HCC)   F/B Dr Woodard   · Arrhythmia   a-fib   · At risk for sleep apnea   CHERISE 4   · Atrial fibrillation (HCC)   F/B Dr Woodard   · Cancer (HCC) 03/2021   Colon   · Essential hypertension   · Frequent urination at night   · Swallowing difficulty   with only pills - since childhood   · Uncircumcised male   per patient       Past Surgical History:   Procedure Laterality Date   · APPENDECTOMY 03/15/2024   appendix removed   · AV NODE ABLATION 06/08/2022   · CARDIAC SURGERY 05/2022   Pacemaker   · COLONOSCOPY N/A 12/15/2021   COLONOSCOPY performed by Laureano Bravo MD at hospitals ENDOSCOPY   · COLONOSCOPY N/A 01/26/2021   COLONOSCOPY performed by Laureano Bravo MD at hospitals ENDOSCOPY   · COLONOSCOPY N/A 03/20/2024   COLONOSCOPY performed by Laureano Bravo MD at hospitals ENDOSCOPY   · COLONOSCOPY,DIAGNOSTIC 12/15/2021     · COLONOSCOPY,REMV LESN,SNARE 01/26/2021     · COLONOSCPY,FLEX,W/DIR SUBMUC INJECT 01/26/2021     · LAPAROSCOPIC

## 2024-09-19 ENCOUNTER — TELEPHONE (OUTPATIENT)
Age: 79
End: 2024-09-19

## 2024-09-19 DIAGNOSIS — M40.209 KYPHOSIS, UNSPECIFIED KYPHOSIS TYPE, UNSPECIFIED SPINAL REGION: Primary | ICD-10-CM

## 2024-09-25 ENCOUNTER — PATIENT MESSAGE (OUTPATIENT)
Age: 79
End: 2024-09-25

## 2024-09-26 ENCOUNTER — PATIENT MESSAGE (OUTPATIENT)
Age: 79
End: 2024-09-26

## 2024-09-26 DIAGNOSIS — M40.209 KYPHOSIS, UNSPECIFIED KYPHOSIS TYPE, UNSPECIFIED SPINAL REGION: Primary | ICD-10-CM

## 2024-10-08 ENCOUNTER — HOSPITAL ENCOUNTER (OUTPATIENT)
Facility: HOSPITAL | Age: 79
Discharge: HOME | End: 2024-10-11
Attending: INTERNAL MEDICINE
Payer: MEDICARE

## 2024-10-08 DIAGNOSIS — D70.1 AGRANULOCYTOSIS SECONDARY TO CANCER CHEMOTHERAPY (HCC): ICD-10-CM

## 2024-10-08 DIAGNOSIS — D37.3 NEOPLASM OF UNCERTAIN OR UNKNOWN BEHAVIOR OF APPENDIX: ICD-10-CM

## 2024-10-08 DIAGNOSIS — T45.1X5A AGRANULOCYTOSIS SECONDARY TO CANCER CHEMOTHERAPY (HCC): ICD-10-CM

## 2024-10-08 DIAGNOSIS — Z51.11 ENCOUNTER FOR ANTINEOPLASTIC CHEMOTHERAPY: ICD-10-CM

## 2024-10-08 DIAGNOSIS — C18.7 MALIGNANT NEOPLASM OF SIGMOID COLON (HCC): ICD-10-CM

## 2024-10-08 DIAGNOSIS — J22 UNSPECIFIED ACUTE LOWER RESPIRATORY INFECTION: ICD-10-CM

## 2024-10-08 PROCEDURE — 71260 CT THORAX DX C+: CPT

## 2024-10-08 PROCEDURE — 2500000003 HC RX 250 WO HCPCS: Performed by: INTERNAL MEDICINE

## 2024-10-08 PROCEDURE — 6360000004 HC RX CONTRAST MEDICATION: Performed by: INTERNAL MEDICINE

## 2024-10-08 RX ORDER — IOPAMIDOL 755 MG/ML
100 INJECTION, SOLUTION INTRAVASCULAR
Status: COMPLETED | OUTPATIENT
Start: 2024-10-08 | End: 2024-10-08

## 2024-10-08 RX ADMIN — BARIUM SULFATE 900 ML: 20 SUSPENSION ORAL at 10:22

## 2024-10-08 RX ADMIN — IOPAMIDOL 100 ML: 755 INJECTION, SOLUTION INTRAVENOUS at 10:22

## 2024-10-09 LAB — CREAT BLD-MCNC: 0.8 MG/DL (ref 0.6–1.3)

## 2024-11-23 NOTE — PERIOP NOTES
PAT appointment with patient - read and reviewed ERAS book and bowel prep instructions with patient. With all questions answered. Advised pt to bring binder with them to hospital DOS and to all follow-up appointments. JAYASHREE 4= has gone to sleep clinic years ago - no treatment required.     Call made to Dr. Moe Garay office - requesting instructions for Eliquis 23-Nov-2024 09:19

## 2025-01-12 ENCOUNTER — HOSPITAL ENCOUNTER (EMERGENCY)
Facility: HOSPITAL | Age: 80
Discharge: HOME | End: 2025-01-12
Attending: EMERGENCY MEDICINE
Payer: MEDICARE

## 2025-01-12 ENCOUNTER — APPOINTMENT (OUTPATIENT)
Facility: HOSPITAL | Age: 80
End: 2025-01-12
Payer: MEDICARE

## 2025-01-12 VITALS
HEART RATE: 82 BPM | SYSTOLIC BLOOD PRESSURE: 136 MMHG | OXYGEN SATURATION: 97 % | TEMPERATURE: 97.7 F | WEIGHT: 210.32 LBS | BODY MASS INDEX: 33.01 KG/M2 | RESPIRATION RATE: 18 BRPM | DIASTOLIC BLOOD PRESSURE: 85 MMHG | HEIGHT: 67 IN

## 2025-01-12 DIAGNOSIS — S20.212A CONTUSION OF LEFT CHEST WALL, INITIAL ENCOUNTER: Primary | ICD-10-CM

## 2025-01-12 DIAGNOSIS — S00.83XA CONTUSION OF FACE, INITIAL ENCOUNTER: ICD-10-CM

## 2025-01-12 PROCEDURE — 70450 CT HEAD/BRAIN W/O DYE: CPT

## 2025-01-12 PROCEDURE — 71250 CT THORAX DX C-: CPT

## 2025-01-12 PROCEDURE — 99284 EMERGENCY DEPT VISIT MOD MDM: CPT

## 2025-01-12 RX ORDER — LIDOCAINE 50 MG/G
1 PATCH TOPICAL DAILY
Qty: 10 PATCH | Refills: 0 | Status: SHIPPED | OUTPATIENT
Start: 2025-01-12 | End: 2025-01-22

## 2025-01-12 ASSESSMENT — LIFESTYLE VARIABLES
HOW OFTEN DO YOU HAVE A DRINK CONTAINING ALCOHOL: NEVER
HOW MANY STANDARD DRINKS CONTAINING ALCOHOL DO YOU HAVE ON A TYPICAL DAY: PATIENT DOES NOT DRINK

## 2025-01-12 ASSESSMENT — PAIN SCALES - GENERAL: PAINLEVEL_OUTOF10: 0

## 2025-01-12 NOTE — ED NOTES
Discharge paperwork reviewed and provided to pt by provider. Time was given to ask any questions or concerns which there were none att.

## 2025-01-12 NOTE — ED PROVIDER NOTES
(36.5 °C)    TempSrc: Oral    SpO2: 100% 97%   Weight: 95.4 kg (210 lb 5.1 oz) 95.4 kg (210 lb 5.1 oz)   Height: 1.702 m (5' 7\") 1.702 m (5' 7\")        Patient was given the following medications:  Medications - No data to display    Medical Decision Making  79-year-old male with history of A-fib on Eliquis, hypertension, who presents with a chief complaint of left rib pain after a fall.  Patient states he slipped and fell on ice 4 days ago.  Hit his right arm and then when trying to get up slipped again and hit his left chest wall.  He was seen at Ortho on-call where he was found to have a shoulder fracture and placed in a sling.  States that over the last 24 to 48 hours he has had some worsening pain in his left rib cage which prompted the trip to the emergency department today.  He did hit his head and has some bruising under the left eye but denies any severe headache.  No shortness of breath or abdominal pain.    On exam patient is nontoxic-appearing, hemodynamically stable.  Does have some bruising under the left eye.  Additionally has tenderness over the left anterior ribs.  Differential includes contusion, fracture.  Given that he is anticoagulated we will check CT imaging of the head to rule out acute intracranial process.  Will check CT imaging of the chest to rule out rib fracture/PTX.  Patient offered pain medication but declines at this time.    Problems Addressed:  Contusion of face, initial encounter: acute illness or injury  Contusion of left chest wall, initial encounter: acute illness or injury    Amount and/or Complexity of Data Reviewed  Radiology: ordered. Decision-making details documented in ED Course.    Risk  Prescription drug management.          CLINICAL MANAGEMENT TOOLS:  Not Applicable             Imaging without acute traumatic findings  OTC medications for pain, lidocaine patches, follow up with ortho as planned      FINAL IMPRESSION     1. Contusion of left chest wall, initial

## 2025-01-12 NOTE — DISCHARGE INSTRUCTIONS
CT Head W/O Contrast   Final Result   No acute abnormality.            Electronically signed by ANNE-MARIE QUACH      CT CHEST WO CONTRAST   Final Result   No evidence of acute traumatic injury to the chest.      Electronically signed by Robert Guzman

## 2025-02-18 RX ORDER — APIXABAN 5 MG/1
5 TABLET, FILM COATED ORAL 2 TIMES DAILY
Qty: 180 TABLET | Refills: 1 | Status: SHIPPED | OUTPATIENT
Start: 2025-02-18

## 2025-02-18 NOTE — TELEPHONE ENCOUNTER
Med refilled per VO by MD.    Future Appointments   Date Time Provider Department Center   9/18/2025  2:40 PM [id number]   9/18/2025  3:00 PM WoodardMark MD [facility]

## 2025-02-27 ENCOUNTER — TRANSCRIBE ORDERS (OUTPATIENT)
Facility: HOSPITAL | Age: 80
End: 2025-02-27

## 2025-02-27 DIAGNOSIS — D70.1 AGRANULOCYTOSIS SECONDARY TO CANCER CHEMOTHERAPY (CODE): ICD-10-CM

## 2025-02-27 DIAGNOSIS — Z51.11 ENCOUNTER FOR ANTINEOPLASTIC CHEMOTHERAPY: ICD-10-CM

## 2025-02-27 DIAGNOSIS — C18.7 MALIGNANT NEOPLASM OF SIGMOID COLON (HCC): Primary | ICD-10-CM

## 2025-02-27 DIAGNOSIS — J22 UNSPECIFIED ACUTE LOWER RESPIRATORY INFECTION: ICD-10-CM

## 2025-02-27 DIAGNOSIS — D37.3 NEOPLASM OF UNCERTAIN BEHAVIOR OF APPENDIX: ICD-10-CM

## 2025-04-10 ENCOUNTER — TELEPHONE (OUTPATIENT)
Age: 80
End: 2025-04-10

## 2025-04-10 RX ORDER — ATENOLOL 50 MG/1
50 TABLET ORAL 2 TIMES DAILY
Qty: 180 TABLET | Refills: 1 | Status: SHIPPED | OUTPATIENT
Start: 2025-04-10

## 2025-04-10 NOTE — TELEPHONE ENCOUNTER
Patient is calling because he is requesting a refill on his Atenolol 50 mg.Patient would like a 90 day refill.    Pharmacy:  [facility] - [place], [address] [phone] - F [phone] patient

## 2025-05-31 PROCEDURE — 93294 REM INTERROG EVL PM/LDLS PM: CPT | Performed by: INTERNAL MEDICINE

## 2025-08-05 RX ORDER — APIXABAN 5 MG/1
5 TABLET, FILM COATED ORAL 2 TIMES DAILY
Qty: 180 TABLET | Refills: 1 | Status: SHIPPED | OUTPATIENT
Start: 2025-08-05

## (undated) DEVICE — STAPLER 60 RELOAD WHITE: Brand: SUREFORM

## (undated) DEVICE — SET ADMIN 16ML TBNG L100IN 2 Y INJ SITE IV PIGGY BK DISP

## (undated) DEVICE — YANKAUER,POOLE TIP,STERILE,50/CS: Brand: MEDLINE

## (undated) DEVICE — TRAY PREP DRY W/ PREM GLV 2 APPL 6 SPNG 2 UNDPD 1 OVERWRAP

## (undated) DEVICE — SET GRAV CK VLV NEEDLESS ST 3 GANGED 4WAY STPCOCK HI FLO 10

## (undated) DEVICE — STRAP,POSITIONING,KNEE/BODY,FOAM,4X60": Brand: MEDLINE

## (undated) DEVICE — GLOVE ORANGE PI 7 1/2   MSG9075

## (undated) DEVICE — STAPLER 60: Brand: SUREFORM

## (undated) DEVICE — NEEDLE HYPO 25GA L1.5IN BVL ORIENTED ECLIPSE

## (undated) DEVICE — SUTURE PDS II SZ 3-0 L27IN ABSRB VLT L26MM SH 1/2 CIR Z316H

## (undated) DEVICE — DUAL LUMEN STOMACH TUBE MULTI-FUNCTIONAL PORT: Brand: SALEM SUMP

## (undated) DEVICE — Z DISCONTINUED PER MEDLINE LINE GAS SAMPLING O2/CO2 LNG AD 13 FT NSL W/ TBNG FILTERLINE

## (undated) DEVICE — GRASPER ENDOSCP TIP L10MM ANVIL ROT RATCH HNDL DISP

## (undated) DEVICE — PAD GROUNDING ADLT ADH FOIL 9FT CORD UNIV

## (undated) DEVICE — COLON CLOSING PACK: Brand: MEDLINE INDUSTRIES, INC.

## (undated) DEVICE — ACCESS PLATFORM FOR MINIMALLY INVASIVE SURGERY: Brand: GELPOINT®  MINI ADVANCED ACCESS PLATFORM

## (undated) DEVICE — Device

## (undated) DEVICE — HANDLE STPLR REUSE FOR IDRIVE ULT PWR STPL SYS

## (undated) DEVICE — KIT,ANTI FOG,W/SPONGE & FLUID,SOFT PACK: Brand: MEDLINE

## (undated) DEVICE — 1200 GUARD II KIT W/5MM TUBE W/O VAC TUBE: Brand: GUARDIAN

## (undated) DEVICE — CONTAINER SPEC 20 ML LID NEUT BUFF FORMALIN 10 % POLYPR STS

## (undated) DEVICE — HYBRID TUBING WITH CO2 CONNECTION FOR OLYMPUS 140/160/180/190 SERIES GI ENDOSCOPES WITH OLYMPUS AFU-100 , OLYMPUS OFP: Brand: ENDOGATOR HYBRID IRRIGATION TUBING

## (undated) DEVICE — CATH IV AUTOGRD BC PNK 20GA 25 -- INSYTE

## (undated) DEVICE — SUTURE V-LOC 180 SZ 2-0 L9IN ABSRB VLT GS-21 L37MM 1/2 CIR VLOCM0345

## (undated) DEVICE — FIAPC® PROBE W/ FILTER 2200 C OD 2.3MM/6.9FR; L 2.2M/7.2FT: Brand: ERBE

## (undated) DEVICE — ELECTRO LUBE IS A SINGLE PATIENT USE DEVICE THAT IS INTENDED TO BE USED ON ELECTROSURGICAL ELECTRODES TO REDUCE STICKING.: Brand: KEY SURGICAL ELECTRO LUBE

## (undated) DEVICE — SUTURE DEV SZ 3-0 V-LOC 90 L12IN TO L18IN CV-23 VLT VLOCM0844

## (undated) DEVICE — GOWN,SIRUS,NONRNF,SETINSLV,2XL,18/CS: Brand: MEDLINE

## (undated) DEVICE — NEONATAL-ADULT SPO2 SENSOR: Brand: NELLCOR

## (undated) DEVICE — ORISE GEL

## (undated) DEVICE — Z DISC USE 2220190 SUTURE VCRL SZ 3-0 L27IN ABSRB UD L26MM SH 1/2 CIR J416H

## (undated) DEVICE — SOL IRR STRL H2O 1000ML BTL --

## (undated) DEVICE — SYSTEM CLOSURE 6-12 FR VEN VASC VASCADE MVP

## (undated) DEVICE — STERILE POLYISOPRENE POWDER-FREE SURGICAL GLOVES: Brand: PROTEXIS

## (undated) DEVICE — NON-REM POLYHESIVE PATIENT RETURN ELECTRODE: Brand: VALLEYLAB

## (undated) DEVICE — SOLIDIFIER FLD 2OZ 1500CC N DISINF IN BTL DISP SAFESORB

## (undated) DEVICE — COVER MPLR TIP CRV SCIS ACC DA VINCI

## (undated) DEVICE — TRAP ENDOSCP POLYP 2 CHMBR DRAWER TYP

## (undated) DEVICE — ELECTRODE,RADIOTRANSLUCENT,FOAM,5PK: Brand: MEDLINE

## (undated) DEVICE — 3M™ TEGADERM™ TRANSPARENT FILM DRESSING FRAME STYLE, 1626W, 4 IN X 4-3/4 IN (10 CM X 12 CM), 50/CT 4CT/CASE: Brand: 3M™ TEGADERM™

## (undated) DEVICE — REM POLYHESIVE ADULT PATIENT RETURN ELECTRODE: Brand: VALLEYLAB

## (undated) DEVICE — NEEDLE ANGIO 18GAX7CM SECURELOC

## (undated) DEVICE — REDUCER CANN ENDOWRIST 12-8MM -- DA VINCI XI - SNGL USE

## (undated) DEVICE — FIAPC® PROBE W/ FILTER 2200 A OD 2.3MM/6.9FR; L 2.2M/7.2FT: Brand: ERBE

## (undated) DEVICE — Z DISC USE 2220195 SUTURE VCRL SZ 4-0 L27IN ABSRB UD L19MM PS-2 3/8 CIR PRIM J426H

## (undated) DEVICE — SUT SLK 2-0SH 30IN BLK --

## (undated) DEVICE — PREP SKN CHLRAPRP APL 26ML STR --

## (undated) DEVICE — SUTURE VCRL SZ 1 L36IN ABSRB UD L36MM CT-1 1/2 CIR J947H

## (undated) DEVICE — SYR 10ML LUER LOK 1/5ML GRAD --

## (undated) DEVICE — TOWEL SURG W17XL27IN STD BLU COT NONFENESTRATED PREWASHED

## (undated) DEVICE — SUTURE ETHBND EXCEL SZ 2 L30IN NONABSORBABLE GRN L40MM V-37 MX69G

## (undated) DEVICE — ROCKER SWITCH PENCIL BLADE ELECTRODE, HOLSTER: Brand: EDGE

## (undated) DEVICE — SNARE ENDOSCP POLYP MED STD AD 2.4X27X240 CM 2.8 MM OVL SENS

## (undated) DEVICE — VISUALIZATION SYSTEM: Brand: CLEARIFY

## (undated) DEVICE — SUTURE PERMAHAND SZ 0 L18IN NONABSORBABLE BLK SILK BRAID A186H

## (undated) DEVICE — JELLY,LUBE,STERILE,FLIP TOP,TUBE,4-OZ: Brand: MEDLINE

## (undated) DEVICE — STAIN INDIA INK IN NACL 10ML --

## (undated) DEVICE — MEDI-TRACE CADENCE ADULT, DEFIBRILLATION ELECTRODE -RTS  (10 PR/PK) - PHYSIO-CONTROL: Brand: MEDI-TRACE CADENCE

## (undated) DEVICE — SYR 3ML LL TIP 1/10ML GRAD --

## (undated) DEVICE — STERILE-Z MAYO STAND COVERS CLEAR POLYETHYLENE STERILE UNIVERSAL FIT 20 PER CASE: Brand: STERILE-Z

## (undated) DEVICE — SNARE ENDOSCP M L240CM W27MM SHTH DIA2.4MM CHN 2.8MM OVL

## (undated) DEVICE — LAPAROSCOPIC ACCESS SYSTEM: Brand: ALEXIS LAPAROSCOPIC SYSTEM WITH KII FIOS FIRST ENTRY

## (undated) DEVICE — (D)AGENT INJECTN ELEVIEW 10ML -- DISC BY MFG

## (undated) DEVICE — AGENT HEMSTAT 3GM PURIFIED PLNT STARCH PWD ABSRB ARISTA AH

## (undated) DEVICE — CUFF BLD PRSS AD CLTH SGL TB W/ BAYNT CONN ROUNDED CORNER

## (undated) DEVICE — SUTURE MCRYL SZ 4-0 L27IN ABSRB UD L19MM PS-2 1/2 CIR PRIM Y426H

## (undated) DEVICE — FORCEPS BX L240CM JAW DIA2.4MM ORNG L CAP W/ NDL DISP RAD

## (undated) DEVICE — STRAINER URIN CALC RNL MSH -- CONVERT TO ITEM 357634

## (undated) DEVICE — 3M™ IOBAN™ 2 ANTIMICROBIAL INCISE DRAPE 6650EZ: Brand: IOBAN™ 2

## (undated) DEVICE — SHEET,DRAPE,UNDERBUTTOCK,GRAD POUCH,PORT: Brand: MEDLINE

## (undated) DEVICE — TIP SUCT TRNSPAR RIB SURF STD BLB RIG NVENT W/ 5IN1 CONN DYND50138] MEDLINE INDUSTRIES INC]

## (undated) DEVICE — 40418 TRENDELENBURG ONE-STEP ARM PROTECTORS LARGE (1 PAIR): Brand: 40418 TRENDELENBURG ONE-STEP ARM PROTECTORS LARGE (1 PAIR)

## (undated) DEVICE — PINNACLE INTRODUCER SHEATH: Brand: PINNACLE

## (undated) DEVICE — 1LYRTR 16FR10ML100%SIL UMS SNP: Brand: MEDLINE INDUSTRIES, INC.

## (undated) DEVICE — ACCESS PLATFORM FOR MINIMALLY INVASIVE SURGERY.: Brand: GELPORT® LAPAROSCOPIC  SYSTEM

## (undated) DEVICE — TISSUE RETRIEVAL SYSTEM: Brand: INZII RETRIEVAL SYSTEM

## (undated) DEVICE — SUTURE PDS II SZ 1 L27IN ABSRB VLT CT-1 L36MM 1/2 CIR Z341H

## (undated) DEVICE — TEMPERATURE ABLATION CATHETER: Brand: BLAZER® II HTD

## (undated) DEVICE — BASIN EMSIS 16OZ GRAPHITE PLAS KID SHP MOLD GRAD FOR ORAL

## (undated) DEVICE — TRI-LUMEN FILTERED TUBE SET WITH ACTIVATED CHARCOAL FILTER: Brand: AIRSEAL

## (undated) DEVICE — SUTURE VCRL SZ 3-0 L27IN ABSRB UD L26MM SH 1/2 CIR J416H

## (undated) DEVICE — SEALER LAP L37CM MARYLAND JAW OPN NANO COAT MULTIFUNCTIONAL

## (undated) DEVICE — STAPLER 60 RELOAD BLUE: Brand: SUREFORM

## (undated) DEVICE — TROCAR: Brand: KII FIOS FIRST ENTRY

## (undated) DEVICE — SYRINGE MED 10ML LUERLOCK TIP W/O SFTY DISP

## (undated) DEVICE — SYRINGE IRRIG 60ML SFT PLIABLE BLB EZ TO GRP 1 HND USE W/

## (undated) DEVICE — NEEDLE HYPO 18GA L1.5IN PNK S STL HUB POLYPR SHLD REG BVL

## (undated) DEVICE — CANISTER, RIGID, 3000CC: Brand: MEDLINE INDUSTRIES, INC.

## (undated) DEVICE — TOTAL TRAY, 16FR 10ML SIL FOLEY, URN: Brand: MEDLINE

## (undated) DEVICE — TOWEL 4 PLY TISS 19X30 SUE WHT

## (undated) DEVICE — HYPODERMIC SAFETY NEEDLE: Brand: MAGELLAN

## (undated) DEVICE — DRESSING ANTIMIC FOAM OPTIFOAM POSTOP ADH 4 X 6 IN

## (undated) DEVICE — PACEMAKER SETUP: Brand: MEDLINE INDUSTRIES, INC.

## (undated) DEVICE — DRAPE,ROBOTICS,STERILE: Brand: MEDLINE

## (undated) DEVICE — AIRSEAL 8 MM ACCESS PORT AND LOW PROFILE OBTURATOR WITH BLADELESS OPTICAL TIP, 100 MM LENGTH: Brand: AIRSEAL

## (undated) DEVICE — LEGGINGS: Brand: CONVERTORS

## (undated) DEVICE — SOLUTION IRRIGATION H2O 0797305] ICU MEDICAL INC]

## (undated) DEVICE — VESSEL SEALER: Brand: ENDOWRIST

## (undated) DEVICE — NEEDLE SCLERO 25GA L240CM OD0.51MM ID0.24MM EXTN L4MM SHTH

## (undated) DEVICE — STAPLER INT SHFT L55MM STPL H1.5X1.8X2MM 6 ROW ENDO-SURGERY

## (undated) DEVICE — TRAP,MUCUS SPECIMEN, 80CC: Brand: MEDLINE

## (undated) DEVICE — INFECTION CONTROL KIT SYS

## (undated) DEVICE — STERILE POLYISOPRENE POWDER-FREE SURGICAL GLOVES WITH EMOLLIENT COATING: Brand: PROTEXIS

## (undated) DEVICE — IV START KIT: Brand: MEDLINE

## (undated) DEVICE — SURGICAL PROCEDURE PACK BASIN MAJ SET CUST NO CAUT

## (undated) DEVICE — HEART CATH-MRMC: Brand: MEDLINE INDUSTRIES, INC.

## (undated) DEVICE — Z DISC USE 2220241 SUTURE SZ 0 27IN 5/8 CIR UR-6  TAPER PT VIOLET ABSRB VICRYL J603H

## (undated) DEVICE — SUTURE PERMAHAND SZ 0 L30IN NONABSORBABLE BLK SILK BRAID A306H

## (undated) DEVICE — GARMENT,MEDLINE,DVT,INT,CALF,MED, GEN2: Brand: MEDLINE

## (undated) DEVICE — PATIENT RETURN ELECTRODE, SINGLE-USE, CONTACT QUALITY MONITORING, ADULT, WITH 9FT CORD, FOR PATIENTS WEIGING OVER 33LBS. (15KG): Brand: MEGADYNE

## (undated) DEVICE — INJECTION THERAPY NEEDLE CATHETER: Brand: INTERJECT

## (undated) DEVICE — SUTURE PERMAHAND SZ 3-0 L30IN NONABSORBABLE BLK SH L26MM K832H

## (undated) DEVICE — ELECTRODE PT RET AD L9FT HI MOIST COND ADH HYDRGEL CORDED

## (undated) DEVICE — SOLUTION IV 1000ML 0.9% SOD CHL PH 5 INJ USP VIAFLX PLAS

## (undated) DEVICE — HEMO INTRO 8.5F 60CM SR0 --

## (undated) DEVICE — INTRO PEELWY HEMVLV 7F 13CM -- SHRT PRELUDE SNAP

## (undated) DEVICE — 40580 - THE PINK PAD - ADVANCED TRENDELENBURG POSITIONING KIT: Brand: 40580 - THE PINK PAD - ADVANCED TRENDELENBURG POSITIONING KIT

## (undated) DEVICE — ENDOSCOPIC KIT COMPLIANCE ENDOKIT

## (undated) DEVICE — GENERAL LAPAROSCOPY-MRMC: Brand: MEDLINE INDUSTRIES, INC.

## (undated) DEVICE — STAPLER RELOAD SUREFORM 60 BLU -- DA VINCI XI

## (undated) DEVICE — STAPLER INT L28CM DIA29MM CLS STPL H10-2.5MM OPN LEG L5.5MM

## (undated) DEVICE — DERMABOND SKIN ADH 0.7ML -- DERMABOND ADVANCED 12/BX

## (undated) DEVICE — SOLID-TIP ABLATION CATHETER CABLE, STERILE CABLE: Brand: BLAZER™

## (undated) DEVICE — SOLUTION IRRIG 1000ML 0.9% SOD CHL USP POUR PLAS BTL